# Patient Record
Sex: MALE | Race: OTHER | HISPANIC OR LATINO | URBAN - METROPOLITAN AREA
[De-identification: names, ages, dates, MRNs, and addresses within clinical notes are randomized per-mention and may not be internally consistent; named-entity substitution may affect disease eponyms.]

---

## 2022-07-18 ENCOUNTER — INPATIENT (INPATIENT)
Facility: HOSPITAL | Age: 87
LOS: 6 days | Discharge: ANOTHER HOSPITAL | DRG: 305 | End: 2022-07-25
Attending: INTERNAL MEDICINE | Admitting: INTERNAL MEDICINE
Payer: MEDICARE

## 2022-07-18 VITALS
TEMPERATURE: 99 F | OXYGEN SATURATION: 95 % | HEART RATE: 128 BPM | RESPIRATION RATE: 17 BRPM | WEIGHT: 240.08 LBS | SYSTOLIC BLOOD PRESSURE: 141 MMHG | HEIGHT: 69 IN | DIASTOLIC BLOOD PRESSURE: 76 MMHG

## 2022-07-18 DIAGNOSIS — I48.91 UNSPECIFIED ATRIAL FIBRILLATION: ICD-10-CM

## 2022-07-18 DIAGNOSIS — E11.9 TYPE 2 DIABETES MELLITUS WITHOUT COMPLICATIONS: ICD-10-CM

## 2022-07-18 DIAGNOSIS — Z29.9 ENCOUNTER FOR PROPHYLACTIC MEASURES, UNSPECIFIED: ICD-10-CM

## 2022-07-18 DIAGNOSIS — R07.9 CHEST PAIN, UNSPECIFIED: ICD-10-CM

## 2022-07-18 DIAGNOSIS — G47.33 OBSTRUCTIVE SLEEP APNEA (ADULT) (PEDIATRIC): ICD-10-CM

## 2022-07-18 DIAGNOSIS — Z86.79 PERSONAL HISTORY OF OTHER DISEASES OF THE CIRCULATORY SYSTEM: ICD-10-CM

## 2022-07-18 LAB
ALBUMIN SERPL ELPH-MCNC: 3.2 G/DL — LOW (ref 3.5–5)
ALP SERPL-CCNC: 64 U/L — SIGNIFICANT CHANGE UP (ref 40–120)
ALT FLD-CCNC: 33 U/L DA — SIGNIFICANT CHANGE UP (ref 10–60)
ANION GAP SERPL CALC-SCNC: 10 MMOL/L — SIGNIFICANT CHANGE UP (ref 5–17)
AST SERPL-CCNC: 18 U/L — SIGNIFICANT CHANGE UP (ref 10–40)
BASOPHILS # BLD AUTO: 0.05 K/UL — SIGNIFICANT CHANGE UP (ref 0–0.2)
BASOPHILS NFR BLD AUTO: 0.4 % — SIGNIFICANT CHANGE UP (ref 0–2)
BILIRUB SERPL-MCNC: 0.4 MG/DL — SIGNIFICANT CHANGE UP (ref 0.2–1.2)
BUN SERPL-MCNC: 18 MG/DL — SIGNIFICANT CHANGE UP (ref 7–18)
CALCIUM SERPL-MCNC: 9.2 MG/DL — SIGNIFICANT CHANGE UP (ref 8.4–10.5)
CHLORIDE SERPL-SCNC: 105 MMOL/L — SIGNIFICANT CHANGE UP (ref 96–108)
CO2 SERPL-SCNC: 25 MMOL/L — SIGNIFICANT CHANGE UP (ref 22–31)
CREAT SERPL-MCNC: 1.72 MG/DL — HIGH (ref 0.5–1.3)
EGFR: 39 ML/MIN/1.73M2 — LOW
EOSINOPHIL # BLD AUTO: 0.09 K/UL — SIGNIFICANT CHANGE UP (ref 0–0.5)
EOSINOPHIL NFR BLD AUTO: 0.8 % — SIGNIFICANT CHANGE UP (ref 0–6)
GLUCOSE BLDC GLUCOMTR-MCNC: 128 MG/DL — HIGH (ref 70–99)
GLUCOSE BLDC GLUCOMTR-MCNC: 134 MG/DL — HIGH (ref 70–99)
GLUCOSE SERPL-MCNC: 152 MG/DL — HIGH (ref 70–99)
HCT VFR BLD CALC: 51.1 % — HIGH (ref 39–50)
HGB BLD-MCNC: 16.9 G/DL — SIGNIFICANT CHANGE UP (ref 13–17)
IMM GRANULOCYTES NFR BLD AUTO: 2 % — HIGH (ref 0–1.5)
LYMPHOCYTES # BLD AUTO: 1.37 K/UL — SIGNIFICANT CHANGE UP (ref 1–3.3)
LYMPHOCYTES # BLD AUTO: 11.7 % — LOW (ref 13–44)
MAGNESIUM SERPL-MCNC: 2.3 MG/DL — SIGNIFICANT CHANGE UP (ref 1.6–2.6)
MCHC RBC-ENTMCNC: 32.5 PG — SIGNIFICANT CHANGE UP (ref 27–34)
MCHC RBC-ENTMCNC: 33.1 GM/DL — SIGNIFICANT CHANGE UP (ref 32–36)
MCV RBC AUTO: 98.3 FL — SIGNIFICANT CHANGE UP (ref 80–100)
MONOCYTES # BLD AUTO: 0.89 K/UL — SIGNIFICANT CHANGE UP (ref 0–0.9)
MONOCYTES NFR BLD AUTO: 7.6 % — SIGNIFICANT CHANGE UP (ref 2–14)
NEUTROPHILS # BLD AUTO: 9.08 K/UL — HIGH (ref 1.8–7.4)
NEUTROPHILS NFR BLD AUTO: 77.5 % — HIGH (ref 43–77)
NRBC # BLD: 0 /100 WBCS — SIGNIFICANT CHANGE UP (ref 0–0)
NT-PROBNP SERPL-SCNC: 1225 PG/ML — HIGH (ref 0–450)
PHOSPHATE SERPL-MCNC: 2.2 MG/DL — LOW (ref 2.5–4.5)
PLATELET # BLD AUTO: 206 K/UL — SIGNIFICANT CHANGE UP (ref 150–400)
POTASSIUM SERPL-MCNC: 4.7 MMOL/L — SIGNIFICANT CHANGE UP (ref 3.5–5.3)
POTASSIUM SERPL-SCNC: 4.7 MMOL/L — SIGNIFICANT CHANGE UP (ref 3.5–5.3)
PROT SERPL-MCNC: 7.8 G/DL — SIGNIFICANT CHANGE UP (ref 6–8.3)
RBC # BLD: 5.2 M/UL — SIGNIFICANT CHANGE UP (ref 4.2–5.8)
RBC # FLD: 13.2 % — SIGNIFICANT CHANGE UP (ref 10.3–14.5)
SARS-COV-2 RNA SPEC QL NAA+PROBE: SIGNIFICANT CHANGE UP
SODIUM SERPL-SCNC: 140 MMOL/L — SIGNIFICANT CHANGE UP (ref 135–145)
TROPONIN I, HIGH SENSITIVITY RESULT: 15.3 NG/L — SIGNIFICANT CHANGE UP
TROPONIN I, HIGH SENSITIVITY RESULT: 284.7 NG/L — HIGH
TROPONIN I, HIGH SENSITIVITY RESULT: 331.6 NG/L — HIGH
WBC # BLD: 11.71 K/UL — HIGH (ref 3.8–10.5)
WBC # FLD AUTO: 11.71 K/UL — HIGH (ref 3.8–10.5)

## 2022-07-18 PROCEDURE — 99285 EMERGENCY DEPT VISIT HI MDM: CPT

## 2022-07-18 PROCEDURE — 71045 X-RAY EXAM CHEST 1 VIEW: CPT | Mod: 26

## 2022-07-18 PROCEDURE — 93010 ELECTROCARDIOGRAM REPORT: CPT | Mod: 76

## 2022-07-18 RX ORDER — HYDRALAZINE HCL 50 MG
50 TABLET ORAL THREE TIMES A DAY
Refills: 0 | Status: DISCONTINUED | OUTPATIENT
Start: 2022-07-18 | End: 2022-07-19

## 2022-07-18 RX ORDER — INSULIN LISPRO 100/ML
VIAL (ML) SUBCUTANEOUS AT BEDTIME
Refills: 0 | Status: DISCONTINUED | OUTPATIENT
Start: 2022-07-18 | End: 2022-07-25

## 2022-07-18 RX ORDER — ATORVASTATIN CALCIUM 80 MG/1
10 TABLET, FILM COATED ORAL AT BEDTIME
Refills: 0 | Status: DISCONTINUED | OUTPATIENT
Start: 2022-07-18 | End: 2022-07-25

## 2022-07-18 RX ORDER — INSULIN LISPRO 100/ML
VIAL (ML) SUBCUTANEOUS
Refills: 0 | Status: DISCONTINUED | OUTPATIENT
Start: 2022-07-18 | End: 2022-07-25

## 2022-07-18 RX ORDER — RIVAROXABAN 15 MG-20MG
15 KIT ORAL
Refills: 0 | Status: DISCONTINUED | OUTPATIENT
Start: 2022-07-18 | End: 2022-07-24

## 2022-07-18 RX ORDER — AMLODIPINE BESYLATE 2.5 MG/1
5 TABLET ORAL DAILY
Refills: 0 | Status: DISCONTINUED | OUTPATIENT
Start: 2022-07-18 | End: 2022-07-19

## 2022-07-18 RX ORDER — MONTELUKAST 4 MG/1
10 TABLET, CHEWABLE ORAL DAILY
Refills: 0 | Status: DISCONTINUED | OUTPATIENT
Start: 2022-07-18 | End: 2022-07-25

## 2022-07-18 RX ADMIN — ATORVASTATIN CALCIUM 10 MILLIGRAM(S): 80 TABLET, FILM COATED ORAL at 21:49

## 2022-07-18 RX ADMIN — Medication 50 MILLIGRAM(S): at 21:49

## 2022-07-18 RX ADMIN — MONTELUKAST 10 MILLIGRAM(S): 4 TABLET, CHEWABLE ORAL at 21:49

## 2022-07-18 RX ADMIN — RIVAROXABAN 15 MILLIGRAM(S): KIT at 17:03

## 2022-07-18 NOTE — ED ADULT TRIAGE NOTE - CHIEF COMPLAINT QUOTE
on and off chest pain for 4 days, pain worse with exertion. 4 baby aspirin and 1 SL nitro was given by EMS.

## 2022-07-18 NOTE — H&P ADULT - NSHPREVIEWOFSYSTEMS_GEN_ALL_CORE
REVIEW OF SYSTEMS:    CONSTITUTIONAL: No weakness, fevers or chills  EYES/ENT: No visual changes;  No vertigo or throat pain   NECK: No pain or stiffness  RESPIRATORY: No cough, wheezing, hemoptysis; + shortness of breath  CARDIOVASCULAR: + chest pain + palpitations  GASTROINTESTINAL: No abdominal or epigastric pain. No nausea, vomiting, or hematemesis; No diarrhea or constipation. No melena or hematochezia.  GENITOURINARY: No dysuria, frequency or hematuria  NEUROLOGICAL: No numbness or weakness  SKIN: No itching, rashes

## 2022-07-18 NOTE — H&P ADULT - NSHPSOCIALHISTORY_GEN_ALL_CORE
Patient is an obese male, Hx of smoking 40 years ago and quit  ambulates with no assistance  lives at home with family

## 2022-07-18 NOTE — H&P ADULT - ASSESSMENT
Patient is an 85 yo PMH of COPD and KENRICK on CPAP, diabetes. A fib on Xarelto, RCC s/p right nephrectomy many years ago, CKD, Obese male who lives at home with wife and son, ambulates at baseline, comes in to the ED, due to gradual onset, worsening, intermittent shortness of breath, worse with exertion, associated with pressure like, mid, upper sternal chest discomfort, over the last 4 days, referred to ED by OP provider, s/p aspirin and nitroglycerin which provided improvement in symptoms in the ED, found to have EKG showing A -fib RVR,     Labs: WBC 11, Cr. 1.7 BNP 12K, trop 15  CXR: wet read: vascular congestion  Hence patient was admitted for A-fib RVR, and further ischemia eval Patient is an 85 yo PMH of COPD and KENRICK on CPAP, diabetes. A fib on Xarelto, RCC s/p right nephrectomy many years ago, CKD, Obese male who lives at home with wife and son, ambulates at baseline, comes in to the ED, due to gradual onset, worsening, intermittent shortness of breath, worse with exertion, associated with pressure like, mid, upper sternal chest discomfort, over the last 4 days, referred to ED by OP provider, s/p aspirin and nitroglycerin which provided improvement in symptoms in the ED, found to have EKG showing A -fib RVR,     Labs: WBC 11, Cr. 1.7 BNP 1200s, trop 15  CXR: wet read: vascular congestion  Hence patient was admitted for A-fib RVR.

## 2022-07-18 NOTE — H&P ADULT - ATTENDING COMMENTS
87 yo PMH of COPD and KENRICK on CPAP, diabetes. A fib on Xarelto, RCC s/p right nephrectomy many years ago, CKD, Obese male who lives at home with wife and son, ambulates at baseline, comes in to the ED, due to gradual onset, worsening, intermittent shortness of breath, worse with exertion, associated with pressure like, mid, upper sternal chest discomfort, over the last 4 days. Patient reports feeling tired, and shortness of breath on waking up, that is chronic however, the last 4 days have been different. As patient was going down the stairs today, felt palpitations and chest pain, that prompted him to call his Pulm, who prompted him to call 911. In the EMS route, received aspirin and nitroglycerin which provided improvement in symptoms in the ED.    Of note: patient follows with Dr. Brynn Rodriguez outpatient: Records shows last Echo in 2020 with EF of 58%.    Reported: pressure headaches with the shortness of breath, as well as chronic numbness and tingling in the feet, however, denied weakness, fever, chills, nausea or vomiting or changes in bowel or bladder habits    In the ED /76  with EKG showing A -fib RVR  Exam: Irregular heart sounds, no acute distress, 1 + pitting edema  Labs: WBC 11, Cr. 1.7 BNP 1200s, trop 15  CXR: wet read: vascular congestion          assessment   --- chest pain,  r/o acs, sob, r/o chf, h/o COPD and KENRICK on CPAP, diabetes. A fib on Xarelto, RCC s/p right nephrectomy many years ago, CKD    plan  --  adm to tele, acs protocol, lopressor, aspirin, statin, lispro ss, supplemental O2, cont preadmit home meds, gi and dvt prophylaxis  cbc, bmp, mg, phos, lipid, tsh, ce q8 x3, hgba1c,     echo    cardio cons     pulm cons

## 2022-07-18 NOTE — H&P ADULT - NSICDXPASTMEDICALHX_GEN_ALL_CORE_FT
PAST MEDICAL HISTORY:  Diabetes     H/O: HTN (hypertension)     Kidney tumor     KENRICK and COPD overlap syndrome

## 2022-07-18 NOTE — H&P ADULT - HISTORY OF PRESENT ILLNESS
Patient is an 87 yo PMH of COPD and KENRICK on CPAP, diabetes. A fib on Xarelto, RCC s/p right nephrectomy many years ago, CKD, Obese male who lives at home with wife and son, ambulates at baseline, comes in to the ED, due to gradual onset, worsening, intermittent shortness of breath, worse with exertion, associated with pressure like, mid, upper sternal chest discomfort, over the last 4 days. Patient reports feeling tired, and shortness of breath on waking up, that is chronic however, the last 4 days have been different. As patient was going down the stairs today, felt palpitations and chest pain, that prompted him to call his Pulm, who prompted him to call 911. In the EMS route, received aspirin and nitroglycerin which provided improvement in symptoms in the ED.    Of note: patient follows with Dr. Brynn Rodriguez outpatient: Records shows last Echo in 2020 with EF of 58%.    Reported: pressure headaches with the shortness of breath, as well as chronic numbness and tingling in the feet, however, denied weakness, fever, chills, nausea or vomiting or changes in bowel or bladder habits    In the ED /76  with EKG showing A -fib RVR  Exam: Irregular heart sounds, no acute distress, 1 + pitting edema  Labs: WBC 11, Cr. 1.7 BNP 12K, trop 15  CXR: wet read: vascular congestion    Hence patient was admitted for A-fib RVR, and further ischemia eval Patient is an 87 yo PMH of COPD and KENRICK on CPAP, diabetes. A fib on Xarelto, RCC s/p right nephrectomy many years ago, CKD, Obese male who lives at home with wife and son, ambulates at baseline, comes in to the ED, due to gradual onset, worsening, intermittent shortness of breath, worse with exertion, associated with pressure like, mid, upper sternal chest discomfort, over the last 4 days. Patient reports feeling tired, and shortness of breath on waking up, that is chronic however, the last 4 days have been different. As patient was going down the stairs today, felt palpitations and chest pain, that prompted him to call his Pulm, who prompted him to call 911. In the EMS route, received aspirin and nitroglycerin which provided improvement in symptoms in the ED.    Of note: patient follows with Dr. Brynn Rodriguez outpatient: Records shows last Echo in 2020 with EF of 58%.    Reported: pressure headaches with the shortness of breath, as well as chronic numbness and tingling in the feet, however, denied weakness, fever, chills, nausea or vomiting or changes in bowel or bladder habits    In the ED /76  with EKG showing A -fib RVR  Exam: Irregular heart sounds, no acute distress, 1 + pitting edema  Labs: WBC 11, Cr. 1.7 BNP 1200s, trop 15  CXR: wet read: vascular congestion    Hence patient was admitted for A-fib RVR, and further ischemia eval

## 2022-07-18 NOTE — ED PROVIDER NOTE - PHYSICAL EXAMINATION
Gen: NAD, AOx3, able to make needs known, non-toxic  Head: NCAT  HEENT: EOMI, oral mucosa moist, normal conjunctiva  Lung: CTAB, no respiratory distress, no wheezes/rhonchi/rales B/L, speaking in full sentences  CV: tachycardic, irregular  Abd: non distended, soft, nontender, no guarding, no CVA tenderness  MSK: no visible deformities  Neuro: Appears non focal  Skin: Warm, well perfused  Psych: normal affect

## 2022-07-18 NOTE — H&P ADULT - PROBLEM SELECTOR PLAN 1
Comes with  4 days of worsening shortness of breath, palpitations, and chest pain  Found with EKG showing RVR 120s  s/p nitro, and felt better, the rate is now controlled  Patient's most recent echo in 2020 with EF 58%  No rate control at home, but on xarelto  BNP in 12K  Trop 15  will continue to trend trop  -f/u CXR read  -Diuresis on stand by, Cr. 1.7, patient is in NAD, and no crackles heard, despite mild edema  -Tele, Echo +/- stress   Dr. Almeida consulted: follow recs Comes with  4 days of worsening shortness of breath, palpitations, and chest pain  Found with EKG showing RVR 120s  s/p nitro, and felt better, the rate is now controlled  Patient's most recent echo in 2020 with EF 58%  No rate control at home, but on xarelto  BNP in 1200  Trop 15  will continue to trend trop  -f/u CXR read  -Diuresis on stand by, Cr. 1.7, patient is in NAD, and no crackles heard, despite mild edema  -Tele, Echo +/- stress   Dr. Almeida consulted: follow recs

## 2022-07-18 NOTE — H&P ADULT - NSHPPHYSICALEXAM_GEN_ALL_CORE
T(C): 36.7 (07-18-22 @ 12:31), Max: 37.1 (07-18-22 @ 09:04)  T(F): 98.1 (07-18-22 @ 12:31), Max: 98.8 (07-18-22 @ 09:04)  HR: 100 (07-18-22 @ 12:31) (100 - 128)  BP: 115/63 (07-18-22 @ 12:31) (115/63 - 141/76)  RR: 18 (07-18-22 @ 12:31) (17 - 18)  SpO2: 97% (07-18-22 @ 12:31) (95% - 97%)    GENERAL: NAD, lying in bed comfortably  HEAD:  Atraumatic, Normocephalic  EYES: EOMI, PERRLA, conjunctiva and sclera clear  ENT: Moist mucous membranes  NECK: Supple, No JVD  CHEST/LUNG: Decreased breath sounds bilaterally  HEART: irregular rate and rhythm; No murmurs, rubs, or gallops  ABDOMEN: Bowel sounds present; Soft, obese, non tender  EXTREMITIES:  2+ Peripheral Pulses, brisk capillary refill. No clubbing, cyanosis, 2+ edema  NERVOUS SYSTEM:  Alert & Oriented X3, speech clear. No deficits   MSK: FROM all 4 extremities, full and equal strength  SKIN: No rashes or lesions

## 2022-07-18 NOTE — ED PROVIDER NOTE - OBJECTIVE STATEMENT
87 y/o M w/ PMH of HTN, DM, HLD, COPD, ?a fib on xarelto presenting w/ chest pain. Seen w/ wife.  used. Reports intermittent chest pain for the past 3-4 days. Described as pressure in center of his chest. Associated w/ some shortness of breath sensation as well. Worse w/ exertion. Describing a sensation of palpitations as well. Spoke w/ his pulmonologist who recommended pt come to ED for eval. EMS gave pt 324 mg of aspirin and 1 nitro. Reports symptoms improved after medication given. Denies fevers, chills, headache, dizziness, blurred vision, cough, abdominal pain, n/v/d/c, urinary symptoms, MSK pain, rash.     PCP: Memo Monae

## 2022-07-18 NOTE — PROVIDER CONTACT NOTE (CRITICAL VALUE NOTIFICATION) - NAME OF MD/NP/PA/DO NOTIFIED:
Dr. Michelle at 1907; instructed to endorse to MAR (Dr. White)    at 1916DrJesus White notified at 1922.

## 2022-07-18 NOTE — H&P ADULT - PROBLEM SELECTOR PLAN 3
Long standing DM hx, now on farxiga and januvia  Finger sticks 160s  c/w ACHS  start with sliding scale   and continue to titrate as needed

## 2022-07-18 NOTE — ED PROVIDER NOTE - CLINICAL SUMMARY MEDICAL DECISION MAKING FREE TEXT BOX
85 y/o M w/ PMH as above presenting w/ SOB and chest pain. Well appearing, no acute distress. Pt w/ a fib on EKG. Stated he takes xarelto for irregular heart beat, however no rate controller medication seen in bag of pt's medications. Symptoms possible due to a fib. Eval for ACS. Eval for CHF exacerbation. Doubtful PNA. Doubtful COPD given no wheezes and good air entry. Plan for labs, EKG, imaging, meds PRN. Likely admission. Will reassess the need for additional interventions as clinically warranted.

## 2022-07-18 NOTE — H&P ADULT - NSHPLABSRESULTS_GEN_ALL_CORE
16.9   11.71 )-----------( 206      ( 18 Jul 2022 10:04 )             51.1     07-18    140  |  105  |  18  ----------------------------<  152<H>  4.7   |  25  |  1.72<H>    Ca    9.2      18 Jul 2022 10:04  Phos  2.2     07-18  Mg     2.3     07-18    TPro  7.8  /  Alb  3.2<L>  /  TBili  0.4  /  DBili  x   /  AST  18  /  ALT  33  /  AlkPhos  64  07-18        LIVER FUNCTIONS - ( 18 Jul 2022 10:04 )  Alb: 3.2 g/dL / Pro: 7.8 g/dL / ALK PHOS: 64 U/L / ALT: 33 U/L DA / AST: 18 U/L / GGT: x                           EKG: A fib RVR rate 120s,     RADIOLOGY STUDIES:  CXR: wet read vascular congestion, pending official read

## 2022-07-18 NOTE — H&P ADULT - PROBLEM SELECTOR PLAN 2
Patient on ICS at home as well CPAP  Dr. Monique consulted  f/u recs for CPAP night settings  Albuterol PRN  symbicort

## 2022-07-19 DIAGNOSIS — N17.9 ACUTE KIDNEY FAILURE, UNSPECIFIED: ICD-10-CM

## 2022-07-19 LAB
A1C WITH ESTIMATED AVERAGE GLUCOSE RESULT: 6.4 % — HIGH (ref 4–5.6)
ALBUMIN SERPL ELPH-MCNC: 3.3 G/DL — LOW (ref 3.5–5)
ALP SERPL-CCNC: 64 U/L — SIGNIFICANT CHANGE UP (ref 40–120)
ALT FLD-CCNC: 35 U/L DA — SIGNIFICANT CHANGE UP (ref 10–60)
ANION GAP SERPL CALC-SCNC: 10 MMOL/L — SIGNIFICANT CHANGE UP (ref 5–17)
APPEARANCE UR: CLEAR — SIGNIFICANT CHANGE UP
AST SERPL-CCNC: 25 U/L — SIGNIFICANT CHANGE UP (ref 10–40)
BACTERIA # UR AUTO: ABNORMAL /HPF
BASOPHILS # BLD AUTO: 0.07 K/UL — SIGNIFICANT CHANGE UP (ref 0–0.2)
BASOPHILS NFR BLD AUTO: 0.7 % — SIGNIFICANT CHANGE UP (ref 0–2)
BILIRUB SERPL-MCNC: 0.9 MG/DL — SIGNIFICANT CHANGE UP (ref 0.2–1.2)
BILIRUB UR-MCNC: NEGATIVE — SIGNIFICANT CHANGE UP
BUN SERPL-MCNC: 20 MG/DL — HIGH (ref 7–18)
CALCIUM SERPL-MCNC: 9.4 MG/DL — SIGNIFICANT CHANGE UP (ref 8.4–10.5)
CHLORIDE SERPL-SCNC: 104 MMOL/L — SIGNIFICANT CHANGE UP (ref 96–108)
CHOLEST SERPL-MCNC: 156 MG/DL — SIGNIFICANT CHANGE UP
CO2 SERPL-SCNC: 26 MMOL/L — SIGNIFICANT CHANGE UP (ref 22–31)
COLOR SPEC: YELLOW — SIGNIFICANT CHANGE UP
COMMENT - URINE: SIGNIFICANT CHANGE UP
CREAT ?TM UR-MCNC: 174 MG/DL — SIGNIFICANT CHANGE UP
CREAT SERPL-MCNC: 1.66 MG/DL — HIGH (ref 0.5–1.3)
DIFF PNL FLD: ABNORMAL
EGFR: 40 ML/MIN/1.73M2 — LOW
EOSINOPHIL # BLD AUTO: 0.18 K/UL — SIGNIFICANT CHANGE UP (ref 0–0.5)
EOSINOPHIL NFR BLD AUTO: 1.7 % — SIGNIFICANT CHANGE UP (ref 0–6)
EPI CELLS # UR: SIGNIFICANT CHANGE UP /HPF
ESTIMATED AVERAGE GLUCOSE: 137 MG/DL — HIGH (ref 68–114)
GLUCOSE BLDC GLUCOMTR-MCNC: 108 MG/DL — HIGH (ref 70–99)
GLUCOSE BLDC GLUCOMTR-MCNC: 124 MG/DL — HIGH (ref 70–99)
GLUCOSE BLDC GLUCOMTR-MCNC: 143 MG/DL — HIGH (ref 70–99)
GLUCOSE BLDC GLUCOMTR-MCNC: 170 MG/DL — HIGH (ref 70–99)
GLUCOSE SERPL-MCNC: 140 MG/DL — HIGH (ref 70–99)
GLUCOSE UR QL: 1000 MG/DL
HCT VFR BLD CALC: 49.4 % — SIGNIFICANT CHANGE UP (ref 39–50)
HDLC SERPL-MCNC: 43 MG/DL — SIGNIFICANT CHANGE UP
HGB BLD-MCNC: 16.7 G/DL — SIGNIFICANT CHANGE UP (ref 13–17)
IMM GRANULOCYTES NFR BLD AUTO: 1.6 % — HIGH (ref 0–1.5)
KETONES UR-MCNC: NEGATIVE — SIGNIFICANT CHANGE UP
LEUKOCYTE ESTERASE UR-ACNC: ABNORMAL
LIPID PNL WITH DIRECT LDL SERPL: 82 MG/DL — SIGNIFICANT CHANGE UP
LYMPHOCYTES # BLD AUTO: 19.2 % — SIGNIFICANT CHANGE UP (ref 13–44)
LYMPHOCYTES # BLD AUTO: 2.01 K/UL — SIGNIFICANT CHANGE UP (ref 1–3.3)
MAGNESIUM SERPL-MCNC: 2.6 MG/DL — SIGNIFICANT CHANGE UP (ref 1.6–2.6)
MCHC RBC-ENTMCNC: 32.9 PG — SIGNIFICANT CHANGE UP (ref 27–34)
MCHC RBC-ENTMCNC: 33.8 GM/DL — SIGNIFICANT CHANGE UP (ref 32–36)
MCV RBC AUTO: 97.2 FL — SIGNIFICANT CHANGE UP (ref 80–100)
MONOCYTES # BLD AUTO: 0.9 K/UL — SIGNIFICANT CHANGE UP (ref 0–0.9)
MONOCYTES NFR BLD AUTO: 8.6 % — SIGNIFICANT CHANGE UP (ref 2–14)
NEUTROPHILS # BLD AUTO: 7.12 K/UL — SIGNIFICANT CHANGE UP (ref 1.8–7.4)
NEUTROPHILS NFR BLD AUTO: 68.2 % — SIGNIFICANT CHANGE UP (ref 43–77)
NITRITE UR-MCNC: NEGATIVE — SIGNIFICANT CHANGE UP
NON HDL CHOLESTEROL: 113 MG/DL — SIGNIFICANT CHANGE UP
NRBC # BLD: 0 /100 WBCS — SIGNIFICANT CHANGE UP (ref 0–0)
OSMOLALITY UR: 630 MOS/KG — SIGNIFICANT CHANGE UP (ref 50–1200)
PH UR: 5 — SIGNIFICANT CHANGE UP (ref 5–8)
PHOSPHATE SERPL-MCNC: 2.7 MG/DL — SIGNIFICANT CHANGE UP (ref 2.5–4.5)
PLATELET # BLD AUTO: 206 K/UL — SIGNIFICANT CHANGE UP (ref 150–400)
POTASSIUM SERPL-MCNC: 4.9 MMOL/L — SIGNIFICANT CHANGE UP (ref 3.5–5.3)
POTASSIUM SERPL-SCNC: 4.9 MMOL/L — SIGNIFICANT CHANGE UP (ref 3.5–5.3)
PROT SERPL-MCNC: 7.7 G/DL — SIGNIFICANT CHANGE UP (ref 6–8.3)
PROT UR-MCNC: 100
RBC # BLD: 5.08 M/UL — SIGNIFICANT CHANGE UP (ref 4.2–5.8)
RBC # FLD: 13.1 % — SIGNIFICANT CHANGE UP (ref 10.3–14.5)
RBC CASTS # UR COMP ASSIST: SIGNIFICANT CHANGE UP /HPF (ref 0–2)
SODIUM SERPL-SCNC: 140 MMOL/L — SIGNIFICANT CHANGE UP (ref 135–145)
SODIUM UR-SCNC: 19 MMOL/L — SIGNIFICANT CHANGE UP
SP GR SPEC: 1.02 — SIGNIFICANT CHANGE UP (ref 1.01–1.02)
TRIGL SERPL-MCNC: 157 MG/DL — HIGH
TSH SERPL-MCNC: 1.99 UU/ML — SIGNIFICANT CHANGE UP (ref 0.34–4.82)
UROBILINOGEN FLD QL: NEGATIVE — SIGNIFICANT CHANGE UP
WBC # BLD: 10.45 K/UL — SIGNIFICANT CHANGE UP (ref 3.8–10.5)
WBC # FLD AUTO: 10.45 K/UL — SIGNIFICANT CHANGE UP (ref 3.8–10.5)
WBC UR QL: ABNORMAL /HPF (ref 0–5)

## 2022-07-19 RX ORDER — AMIODARONE HYDROCHLORIDE 400 MG/1
TABLET ORAL
Refills: 0 | Status: DISCONTINUED | OUTPATIENT
Start: 2022-07-19 | End: 2022-07-20

## 2022-07-19 RX ORDER — PANTOPRAZOLE SODIUM 20 MG/1
40 TABLET, DELAYED RELEASE ORAL
Refills: 0 | Status: DISCONTINUED | OUTPATIENT
Start: 2022-07-19 | End: 2022-07-25

## 2022-07-19 RX ORDER — DIGOXIN 250 MCG
500 TABLET ORAL ONCE
Refills: 0 | Status: COMPLETED | OUTPATIENT
Start: 2022-07-19 | End: 2022-07-19

## 2022-07-19 RX ORDER — AMIODARONE HYDROCHLORIDE 400 MG/1
400 TABLET ORAL EVERY 8 HOURS
Refills: 0 | Status: DISCONTINUED | OUTPATIENT
Start: 2022-07-19 | End: 2022-07-20

## 2022-07-19 RX ORDER — METOPROLOL TARTRATE 50 MG
25 TABLET ORAL EVERY 8 HOURS
Refills: 0 | Status: DISCONTINUED | OUTPATIENT
Start: 2022-07-19 | End: 2022-07-20

## 2022-07-19 RX ADMIN — ATORVASTATIN CALCIUM 10 MILLIGRAM(S): 80 TABLET, FILM COATED ORAL at 21:35

## 2022-07-19 RX ADMIN — Medication 25 MILLIGRAM(S): at 08:57

## 2022-07-19 RX ADMIN — Medication 25 MILLIGRAM(S): at 21:35

## 2022-07-19 RX ADMIN — Medication 50 MILLIGRAM(S): at 05:57

## 2022-07-19 RX ADMIN — Medication 25 MILLIGRAM(S): at 14:21

## 2022-07-19 RX ADMIN — AMLODIPINE BESYLATE 5 MILLIGRAM(S): 2.5 TABLET ORAL at 05:57

## 2022-07-19 RX ADMIN — AMIODARONE HYDROCHLORIDE 400 MILLIGRAM(S): 400 TABLET ORAL at 21:35

## 2022-07-19 RX ADMIN — PANTOPRAZOLE SODIUM 40 MILLIGRAM(S): 20 TABLET, DELAYED RELEASE ORAL at 12:31

## 2022-07-19 RX ADMIN — RIVAROXABAN 15 MILLIGRAM(S): KIT at 17:53

## 2022-07-19 RX ADMIN — AMIODARONE HYDROCHLORIDE 400 MILLIGRAM(S): 400 TABLET ORAL at 14:21

## 2022-07-19 RX ADMIN — MONTELUKAST 10 MILLIGRAM(S): 4 TABLET, CHEWABLE ORAL at 12:30

## 2022-07-19 RX ADMIN — Medication 500 MICROGRAM(S): at 08:57

## 2022-07-19 NOTE — CONSULT NOTE ADULT - PROBLEM SELECTOR RECOMMENDATION 2
Cardio follow up  Amiodarone Cardio follow up  Stress test  Amiodarone Cardio eval  tele monitoring  Amiodarone loading  Stress test

## 2022-07-19 NOTE — PROGRESS NOTE ADULT - SUBJECTIVE AND OBJECTIVE BOX
PGY-1 Progress Note discussed with attending    PAGER #: [[phone]] TILL 5:00 PM  PLEASE CONTACT ON CALL TEAM:  - On Call Team (Please refer to Sri) FROM 5:00 PM - 8:30PM  - Nightfloat Team FROM 8:30 -7:30 AM    CHIEF COMPLAINT & BRIEF HOSPITAL COURSE:  Patient is an 85 yo PMH of COPD and KENRICK on CPAP, diabetes. A fib on Xarelto, RCC s/p right nephrectomy many years ago, CKD, Obese male who lives at home with wife and son, ambulates at baseline, comes in to the ED, due to gradual onset, worsening, intermittent shortness of breath, worse with exertion, associated with pressure like, mid, upper sternal chest discomfort, over the last 4 days. Patient reports feeling tired, and shortness of breath on waking up, that is chronic however, the last 4 days have been different. As patient was going down the stairs today, felt palpitations and chest pain, that prompted him to call his Pulm, who prompted him to call 911. In the EMS route, received aspirin and nitroglycerin which provided improvement in symptoms in the ED.    Of note: patient follows with Dr. Brynn Rodriguez outpatient: Records shows last Echo in 2020 with EF of 58%.    Reported: pressure headaches with the shortness of breath, as well as chronic numbness and tingling in the feet, however, denied weakness, fever, chills, nausea or vomiting or changes in bowel or bladder habits    INTERVAL HPI/OVERNIGHT EVENTS:   Patient seen and examined at bedside. Patient in AFIB RVR at bedside, complaining of palpitations. No other complaints today.    MEDICATIONS  (STANDING):  aMIOdarone    Tablet   Oral   aMIOdarone    Tablet 400 milliGRAM(s) Oral every 8 hours  atorvastatin 10 milliGRAM(s) Oral at bedtime  insulin lispro (ADMELOG) corrective regimen sliding scale   SubCutaneous three times a day before meals  insulin lispro (ADMELOG) corrective regimen sliding scale   SubCutaneous at bedtime  metoprolol tartrate 25 milliGRAM(s) Oral every 8 hours  montelukast 10 milliGRAM(s) Oral daily  pantoprazole    Tablet 40 milliGRAM(s) Oral before breakfast  rivaroxaban 15 milliGRAM(s) Oral with dinner    MEDICATIONS  (PRN):      REVIEW OF SYSTEMS:  CONSTITUTIONAL: No fever, weight loss, or fatigue  RESPIRATORY: No cough, wheezing, chills or hemoptysis; No shortness of breath  CARDIOVASCULAR: No chest pain, dizziness, or leg swelling. + Palpitations  GASTROINTESTINAL: No abdominal pain. No nausea, vomiting, or hematemesis; No diarrhea or constipation. No melena or hematochezia.  GENITOURINARY: No dysuria or hematuria, urinary frequency  NEUROLOGICAL: No headaches, memory loss, loss of strength, numbness, or tremors  SKIN: No itching, burning, rashes, or lesions     Vital Signs Last 24 Hrs  T(C): 36.6 (19 Jul 2022 15:45), Max: 36.8 (19 Jul 2022 07:32)  T(F): 97.8 (19 Jul 2022 15:45), Max: 98.3 (19 Jul 2022 07:32)  HR: 83 (19 Jul 2022 15:45) (80 - 105)  BP: 115/70 (19 Jul 2022 15:45) (115/70 - 174/65)  BP(mean): --  RR: 19 (19 Jul 2022 15:45) (18 - 19)  SpO2: 96% (19 Jul 2022 15:45) (93% - 100%)    Parameters below as of 19 Jul 2022 15:45  Patient On (Oxygen Delivery Method): room air        PHYSICAL EXAMINATION:  GENERAL: NAD, Obese  HEAD:  Atraumatic, Normocephalic  EYES:  conjunctiva and sclera clear  NECK: Supple, No JVD, Normal thyroid  CHEST/LUNG: Clear to auscultation. Clear to percussion bilaterally; No rales, rhonchi, wheezing, or rubs  HEART: irregular heart rate and rhythym; No murmurs, rubs, or gallops  ABDOMEN: Soft, Nontender, Nondistended; Bowel sounds present  NERVOUS SYSTEM:  Alert & Oriented X3,    EXTREMITIES:  2+ Peripheral Pulses, No clubbing, cyanosis, or edema  SKIN: warm dry                          16.7   10.45 )-----------( 206      ( 19 Jul 2022 06:58 )             49.4     07-19    140  |  104  |  20<H>  ----------------------------<  140<H>  4.9   |  26  |  1.66<H>    Ca    9.4      19 Jul 2022 06:58  Phos  2.7     07-19  Mg     2.6     07-19    TPro  7.7  /  Alb  3.3<L>  /  TBili  0.9  /  DBili  x   /  AST  25  /  ALT  35  /  AlkPhos  64  07-19    LIVER FUNCTIONS - ( 19 Jul 2022 06:58 )  Alb: 3.3 g/dL / Pro: 7.7 g/dL / ALK PHOS: 64 U/L / ALT: 35 U/L DA / AST: 25 U/L / GGT: x                   CAPILLARY BLOOD GLUCOSE      RADIOLOGY & ADDITIONAL TESTS:    Chest Xray 7/18:  IMPRESSION: Cardiomegaly.  No large airspace consolidation or effusion.

## 2022-07-19 NOTE — PATIENT PROFILE ADULT - FALL HARM RISK - HARM RISK INTERVENTIONS

## 2022-07-19 NOTE — PROGRESS NOTE ADULT - PROBLEM SELECTOR PLAN 1
Comes with  4 days of worsening shortness of breath, palpitations, and chest pain  Found with EKG showing RVR 120s  s/p nitro, and felt better, the rate is now controlled  Patient's most recent echo in 2020 with EF 58%  No rate control at home, but on xarelto  BNP in 1200  Trop 331>284 - likely 2/2 demand ischemia  - CXR read - cardiomegaly, no pulmonary effusions  -Diuresis on stand by, Cr. 1.7, patient is in NAD, and no crackles heard, despite mild edema  -cw Tele  - fu Echo   - fu +/- stress    - cw amiodarone loading, dig .5mg ivx1, xarelto, add lopressor 25mg q8h  Cardio Dr. Almeida following - recommendations noted and appreciated

## 2022-07-19 NOTE — PROGRESS NOTE ADULT - SUBJECTIVE AND OBJECTIVE BOX
Patient is a 87y old  Male who presents with a chief complaint of Chest pain (18 Jul 2022 13:27)    pt seen in icu [  ], reg med floor [   ], bed [  ], chair at bedside [   ], a+o x3 [  ], lethargic [  ],  nad [  ]    carlin [  ], ngt [  ], peg [  ], et tube [  ], cent line [  ], picc line [  ]        Allergies    No Known Allergies        Vitals    T(F): 97.8 (07-19-22 @ 04:55), Max: 98.8 (07-18-22 @ 09:04)  HR: 80 (07-19-22 @ 04:55) (80 - 128)  BP: 127/66 (07-19-22 @ 04:55) (115/63 - 174/65)  RR: 19 (07-19-22 @ 04:55) (17 - 22)  SpO2: 93% (07-19-22 @ 04:55) (93% - 100%)  Wt(kg): --  CAPILLARY BLOOD GLUCOSE      POCT Blood Glucose.: 143 mg/dL (19 Jul 2022 07:41)      Labs                          16.7   10.45 )-----------( 206      ( 19 Jul 2022 06:58 )             49.4       07-19    140  |  104  |  20<H>  ----------------------------<  140<H>  4.9   |  x   |  x     Ca    9.2      18 Jul 2022 10:04  Phos  2.7     07-19  Mg     2.6     07-19    TPro  x   /  Alb  3.3<L>  /  TBili  x   /  DBili  x   /  AST  x   /  ALT  x   /  AlkPhos  x   07-19          Troponin I, High Sensitivity Result: 284.7 ng/L (07-18-22 @ 21:16)  Troponin I, High Sensitivity Result: 331.6 ng/L (07-18-22 @ 18:26)  Troponin I, High Sensitivity Result: 15.3 ng/L (07-18-22 @ 10:04)        Radiology Results      Meds    MEDICATIONS  (STANDING):  amLODIPine   Tablet 5 milliGRAM(s) Oral daily  atorvastatin 10 milliGRAM(s) Oral at bedtime  hydrALAZINE 50 milliGRAM(s) Oral three times a day  insulin lispro (ADMELOG) corrective regimen sliding scale   SubCutaneous three times a day before meals  insulin lispro (ADMELOG) corrective regimen sliding scale   SubCutaneous at bedtime  montelukast 10 milliGRAM(s) Oral daily  rivaroxaban 15 milliGRAM(s) Oral with dinner      MEDICATIONS  (PRN):      Physical Exam    Neuro :  no focal deficits  Respiratory: CTA B/L  CV: RRR, S1S2, no murmurs,   Abdominal: Soft, NT, ND +BS,  Extremities: No edema, + peripheral pulses    ASSESSMENT    Chest pain    Diabetes    Kidney tumor    KENRICK and COPD overlap syndrome    H/O: HTN (hypertension)        PLAN     Patient is a 87y old  Male who presents with a chief complaint of Chest pain (18 Jul 2022 13:27)    pt seen in icu [  ], reg med floor [   ], bed [  ], chair at bedside [   ], a+o x3 [  ], lethargic [  ],  nad [  ]    carlin [  ], ngt [  ], peg [  ], et tube [  ], cent line [  ], picc line [  ]        Allergies    No Known Allergies        Vitals    T(F): 97.8 (07-19-22 @ 04:55), Max: 98.8 (07-18-22 @ 09:04)  HR: 80 (07-19-22 @ 04:55) (80 - 128)  BP: 127/66 (07-19-22 @ 04:55) (115/63 - 174/65)  RR: 19 (07-19-22 @ 04:55) (17 - 22)  SpO2: 93% (07-19-22 @ 04:55) (93% - 100%)  Wt(kg): --  CAPILLARY BLOOD GLUCOSE      POCT Blood Glucose.: 143 mg/dL (19 Jul 2022 07:41)      Labs                          16.7   10.45 )-----------( 206      ( 19 Jul 2022 06:58 )             49.4       07-19    140  |  104  |  20<H>  ----------------------------<  140<H>  4.9   |  x   |  x     Ca    9.2      18 Jul 2022 10:04  Phos  2.7     07-19  Mg     2.6     07-19    TPro  x   /  Alb  3.3<L>  /  TBili  x   /  DBili  x   /  AST  x   /  ALT  x   /  AlkPhos  x   07-19          Troponin I, High Sensitivity Result: 284.7 ng/L (07-18-22 @ 21:16)  Troponin I, High Sensitivity Result: 331.6 ng/L (07-18-22 @ 18:26)  Troponin I, High Sensitivity Result: 15.3 ng/L (07-18-22 @ 10:04)        Radiology Results      Meds    MEDICATIONS  (STANDING):  amLODIPine   Tablet 5 milliGRAM(s) Oral daily  atorvastatin 10 milliGRAM(s) Oral at bedtime  hydrALAZINE 50 milliGRAM(s) Oral three times a day  insulin lispro (ADMELOG) corrective regimen sliding scale   SubCutaneous three times a day before meals  insulin lispro (ADMELOG) corrective regimen sliding scale   SubCutaneous at bedtime  montelukast 10 milliGRAM(s) Oral daily  rivaroxaban 15 milliGRAM(s) Oral with dinner      MEDICATIONS  (PRN):      Physical Exam    Neuro :  no focal deficits  Respiratory: CTA B/L  CV: RRR, S1S2, no murmurs,   Abdominal: Soft, NT, ND +BS,  Extremities: No edema, + peripheral pulses    ASSESSMENT    chest pain,    r/o acs,   sob,   r/o chf,  h/o COPD and KENRICK on CPAP,   diabetes.   A fib on Xarelto,   RCC s/p right nephrectomy many years ago,   CKD    Chest pain    Diabetes    Kidney tumor    KENRICK and COPD overlap syndrome    H/O: HTN (hypertension)        PLAN    adm to tele,   acs protocol,   lopressor,   aspirin,   statin,   lispro ss,   supplemental O2,   cont preadmit home meds,   gi and dvt prophylaxis  cbc,   bmp,  mg,   phos,   lipid,   tsh,   ce q8 x3,   hgba1c,     echo    cardio cons     pulm cons .         Patient is a 87y old  Male who presents with a chief complaint of Chest pain (18 Jul 2022 13:27)    pt seen in tele [ x ], reg med floor [   ], bed [ x ], chair at bedside [   ], a+o x3 [ x ], lethargic [  ],  nad [ x ]      Allergies    No Known Allergies        Vitals    T(F): 97.8 (07-19-22 @ 04:55), Max: 98.8 (07-18-22 @ 09:04)  HR: 80 (07-19-22 @ 04:55) (80 - 128)  BP: 127/66 (07-19-22 @ 04:55) (115/63 - 174/65)  RR: 19 (07-19-22 @ 04:55) (17 - 22)  SpO2: 93% (07-19-22 @ 04:55) (93% - 100%)  Wt(kg): --  CAPILLARY BLOOD GLUCOSE      POCT Blood Glucose.: 143 mg/dL (19 Jul 2022 07:41)      Labs                          16.7   10.45 )-----------( 206      ( 19 Jul 2022 06:58 )             49.4       07-19    140  |  104  |  20<H>  ----------------------------<  140<H>  4.9   |  x   |  x     Ca    9.2      18 Jul 2022 10:04  Phos  2.7     07-19  Mg     2.6     07-19    TPro  x   /  Alb  3.3<L>  /  TBili  x   /  DBili  x   /  AST  x   /  ALT  x   /  AlkPhos  x   07-19          Troponin I, High Sensitivity Result: 284.7 ng/L (07-18-22 @ 21:16)  Troponin I, High Sensitivity Result: 331.6 ng/L (07-18-22 @ 18:26)  Troponin I, High Sensitivity Result: 15.3 ng/L (07-18-22 @ 10:04)        Radiology Results      Meds    MEDICATIONS  (STANDING):  amLODIPine   Tablet 5 milliGRAM(s) Oral daily  atorvastatin 10 milliGRAM(s) Oral at bedtime  hydrALAZINE 50 milliGRAM(s) Oral three times a day  insulin lispro (ADMELOG) corrective regimen sliding scale   SubCutaneous three times a day before meals  insulin lispro (ADMELOG) corrective regimen sliding scale   SubCutaneous at bedtime  montelukast 10 milliGRAM(s) Oral daily  rivaroxaban 15 milliGRAM(s) Oral with dinner      MEDICATIONS  (PRN):      Physical Exam    Neuro :  no focal deficits  Respiratory: CTA B/L  CV: RRR, S1S2, no murmurs,   Abdominal: Soft, NT, ND +BS,  Extremities: No edema, + peripheral pulses    ASSESSMENT    chest pain,    r/o acs,   sob,   r/o chf,  h/o COPD   KENRICK on CPAP,   diabetes.   A fib on Xarelto,   RCC s/p right nephrectomy many years ago,   CKD      PLAN    cont tele,   acs protocol,   lopressor,   aspirin, statin,   trop x 3 noted above  cardio cons  f/u echo   supplemental O2,   pulm cons  hgba1c  lispro ss,   cont current meds

## 2022-07-19 NOTE — CONSULT NOTE ADULT - PROBLEM SELECTOR RECOMMENDATION 9
Sleep study  PFTs as OP  oxygen supp  CPAP Sleep study  PFTs as OP  oxygen supp  CPAP at night  Bronchodilators  Symbicort /4.5 mcgs 2 puffs po BID

## 2022-07-20 DIAGNOSIS — N39.0 URINARY TRACT INFECTION, SITE NOT SPECIFIED: ICD-10-CM

## 2022-07-20 LAB
ALBUMIN SERPL ELPH-MCNC: 3 G/DL — LOW (ref 3.5–5)
ALP SERPL-CCNC: 58 U/L — SIGNIFICANT CHANGE UP (ref 40–120)
ALT FLD-CCNC: 33 U/L DA — SIGNIFICANT CHANGE UP (ref 10–60)
ANION GAP SERPL CALC-SCNC: 6 MMOL/L — SIGNIFICANT CHANGE UP (ref 5–17)
AST SERPL-CCNC: 27 U/L — SIGNIFICANT CHANGE UP (ref 10–40)
BILIRUB SERPL-MCNC: 0.7 MG/DL — SIGNIFICANT CHANGE UP (ref 0.2–1.2)
BUN SERPL-MCNC: 30 MG/DL — HIGH (ref 7–18)
CALCIUM SERPL-MCNC: 9.6 MG/DL — SIGNIFICANT CHANGE UP (ref 8.4–10.5)
CHLORIDE SERPL-SCNC: 105 MMOL/L — SIGNIFICANT CHANGE UP (ref 96–108)
CO2 SERPL-SCNC: 29 MMOL/L — SIGNIFICANT CHANGE UP (ref 22–31)
CREAT SERPL-MCNC: 1.92 MG/DL — HIGH (ref 0.5–1.3)
EGFR: 34 ML/MIN/1.73M2 — LOW
GLUCOSE BLDC GLUCOMTR-MCNC: 133 MG/DL — HIGH (ref 70–99)
GLUCOSE BLDC GLUCOMTR-MCNC: 138 MG/DL — HIGH (ref 70–99)
GLUCOSE BLDC GLUCOMTR-MCNC: 143 MG/DL — HIGH (ref 70–99)
GLUCOSE BLDC GLUCOMTR-MCNC: 145 MG/DL — HIGH (ref 70–99)
GLUCOSE SERPL-MCNC: 139 MG/DL — HIGH (ref 70–99)
HCT VFR BLD CALC: 47.4 % — SIGNIFICANT CHANGE UP (ref 39–50)
HGB BLD-MCNC: 15.8 G/DL — SIGNIFICANT CHANGE UP (ref 13–17)
MAGNESIUM SERPL-MCNC: 2.5 MG/DL — SIGNIFICANT CHANGE UP (ref 1.6–2.6)
MCHC RBC-ENTMCNC: 32.4 PG — SIGNIFICANT CHANGE UP (ref 27–34)
MCHC RBC-ENTMCNC: 33.3 GM/DL — SIGNIFICANT CHANGE UP (ref 32–36)
MCV RBC AUTO: 97.3 FL — SIGNIFICANT CHANGE UP (ref 80–100)
NRBC # BLD: 0 /100 WBCS — SIGNIFICANT CHANGE UP (ref 0–0)
PHOSPHATE SERPL-MCNC: 3.6 MG/DL — SIGNIFICANT CHANGE UP (ref 2.5–4.5)
PLATELET # BLD AUTO: 184 K/UL — SIGNIFICANT CHANGE UP (ref 150–400)
POTASSIUM SERPL-MCNC: 5.2 MMOL/L — SIGNIFICANT CHANGE UP (ref 3.5–5.3)
POTASSIUM SERPL-SCNC: 5.2 MMOL/L — SIGNIFICANT CHANGE UP (ref 3.5–5.3)
PROT SERPL-MCNC: 7 G/DL — SIGNIFICANT CHANGE UP (ref 6–8.3)
RBC # BLD: 4.87 M/UL — SIGNIFICANT CHANGE UP (ref 4.2–5.8)
RBC # FLD: 13.3 % — SIGNIFICANT CHANGE UP (ref 10.3–14.5)
SODIUM SERPL-SCNC: 140 MMOL/L — SIGNIFICANT CHANGE UP (ref 135–145)
WBC # BLD: 9.59 K/UL — SIGNIFICANT CHANGE UP (ref 3.8–10.5)
WBC # FLD AUTO: 9.59 K/UL — SIGNIFICANT CHANGE UP (ref 3.8–10.5)

## 2022-07-20 RX ORDER — CEFTRIAXONE 500 MG/1
1000 INJECTION, POWDER, FOR SOLUTION INTRAMUSCULAR; INTRAVENOUS EVERY 24 HOURS
Refills: 0 | Status: COMPLETED | OUTPATIENT
Start: 2022-07-21 | End: 2022-07-24

## 2022-07-20 RX ORDER — CEFTRIAXONE 500 MG/1
1000 INJECTION, POWDER, FOR SOLUTION INTRAMUSCULAR; INTRAVENOUS ONCE
Refills: 0 | Status: COMPLETED | OUTPATIENT
Start: 2022-07-20 | End: 2022-07-20

## 2022-07-20 RX ORDER — CEFTRIAXONE 500 MG/1
INJECTION, POWDER, FOR SOLUTION INTRAMUSCULAR; INTRAVENOUS
Refills: 0 | Status: COMPLETED | OUTPATIENT
Start: 2022-07-20 | End: 2022-07-25

## 2022-07-20 RX ORDER — AMIODARONE HYDROCHLORIDE 400 MG/1
400 TABLET ORAL
Refills: 0 | Status: COMPLETED | OUTPATIENT
Start: 2022-07-20 | End: 2022-07-23

## 2022-07-20 RX ORDER — METOPROLOL TARTRATE 50 MG
12.5 TABLET ORAL
Refills: 0 | Status: DISCONTINUED | OUTPATIENT
Start: 2022-07-20 | End: 2022-07-25

## 2022-07-20 RX ADMIN — Medication 12.5 MILLIGRAM(S): at 17:22

## 2022-07-20 RX ADMIN — Medication 25 MILLIGRAM(S): at 06:14

## 2022-07-20 RX ADMIN — AMIODARONE HYDROCHLORIDE 400 MILLIGRAM(S): 400 TABLET ORAL at 17:22

## 2022-07-20 RX ADMIN — RIVAROXABAN 15 MILLIGRAM(S): KIT at 17:22

## 2022-07-20 RX ADMIN — AMIODARONE HYDROCHLORIDE 400 MILLIGRAM(S): 400 TABLET ORAL at 06:14

## 2022-07-20 RX ADMIN — MONTELUKAST 10 MILLIGRAM(S): 4 TABLET, CHEWABLE ORAL at 12:32

## 2022-07-20 RX ADMIN — ATORVASTATIN CALCIUM 10 MILLIGRAM(S): 80 TABLET, FILM COATED ORAL at 21:54

## 2022-07-20 RX ADMIN — PANTOPRAZOLE SODIUM 40 MILLIGRAM(S): 20 TABLET, DELAYED RELEASE ORAL at 06:14

## 2022-07-20 RX ADMIN — CEFTRIAXONE 100 MILLIGRAM(S): 500 INJECTION, POWDER, FOR SOLUTION INTRAMUSCULAR; INTRAVENOUS at 08:58

## 2022-07-20 NOTE — PROGRESS NOTE ADULT - SUBJECTIVE AND OBJECTIVE BOX
CHIEF COMPLAINT:Patient is a 87y old  Male who presents with a chief complaint of Chest pain.Pt feeling better.    	  REVIEW OF SYSTEMS:  CONSTITUTIONAL: No fever, weight loss, or fatigue  EYES: No eye pain, visual disturbances, or discharge  ENT:  No difficulty hearing, tinnitus, vertigo; No sinus or throat pain  NECK: No pain or stiffness  RESPIRATORY: No cough, wheezing, chills or hemoptysis; No Shortness of Breath  CARDIOVASCULAR: No chest pain, palpitations, passing out, dizziness, or leg swelling  GASTROINTESTINAL: No abdominal or epigastric pain. No nausea, vomiting, or hematemesis; No diarrhea or constipation. No melena or hematochezia.  GENITOURINARY: No dysuria, frequency, hematuria, or incontinence  NEUROLOGICAL: No headaches, memory loss, loss of strength, numbness, or tremors  SKIN: No itching, burning, rashes, or lesions   LYMPH Nodes: No enlarged glands  ENDOCRINE: No heat or cold intolerance; No hair loss  MUSCULOSKELETAL: No joint pain or swelling; No muscle, back, or extremity pain  PSYCHIATRIC: No depression, anxiety, mood swings, or difficulty sleeping  HEME/LYMPH: No easy bruising, or bleeding gums  ALLERGY AND IMMUNOLOGIC: No hives or eczema	        PHYSICAL EXAM:  T(C): 36.4 (07-20-22 @ 07:37), Max: 36.8 (07-19-22 @ 23:40)  HR: 70 (07-20-22 @ 07:37) (70 - 83)  BP: 138/57 (07-20-22 @ 07:37) (115/70 - 138/57)  RR: 19 (07-20-22 @ 07:37) (18 - 20)  SpO2: 97% (07-20-22 @ 07:37) (94% - 97%)  Wt(kg): --  I&O's Summary    19 Jul 2022 07:01  -  20 Jul 2022 07:00  --------------------------------------------------------  IN: 275 mL / OUT: 0 mL / NET: 275 mL        Appearance: Normal	  HEENT:   Normal oral mucosa, PERRL, EOMI	  Lymphatic: No lymphadenopathy  Cardiovascular: Normal S1 S2, No JVD, No murmurs, No edema  Respiratory: Lungs clear to auscultation	  Psychiatry: A & O x 3, Mood & affect appropriate  Gastrointestinal:  Soft, Non-tender, + BS	  Skin: No rashes, No ecchymoses, No cyanosis	  Neurologic: Non-focal  Extremities: Normal range of motion, No clubbing, cyanosis or edema  Vascular: Peripheral pulses palpable 2+ bilaterally    MEDICATIONS  (STANDING):  aMIOdarone    Tablet 400 milliGRAM(s) Oral two times a day  atorvastatin 10 milliGRAM(s) Oral at bedtime  cefTRIAXone   IVPB      insulin lispro (ADMELOG) corrective regimen sliding scale   SubCutaneous three times a day before meals  insulin lispro (ADMELOG) corrective regimen sliding scale   SubCutaneous at bedtime  metoprolol tartrate 12.5 milliGRAM(s) Oral two times a day  montelukast 10 milliGRAM(s) Oral daily  pantoprazole    Tablet 40 milliGRAM(s) Oral before breakfast  rivaroxaban 15 milliGRAM(s) Oral with dinner      TELEMETRY: 	afib 40-70's      LABS:	 	    Troponin I, High Sensitivity Result: 284.7 ng/L (07-18 @ 21:16)  Troponin I, High Sensitivity Result: 331.6 ng/L (07-18 @ 18:26)  Troponin I, High Sensitivity Result: 15.3 ng/L (07-18 @ 10:04)                            15.8   9.59  )-----------( 184      ( 20 Jul 2022 06:34 )             47.4     07-20    140  |  105  |  30<H>  ----------------------------<  139<H>  5.2   |  29  |  1.92<H>    Ca    9.6      20 Jul 2022 06:34  Phos  3.6     07-20  Mg     2.5     07-20    TPro  7.0  /  Alb  3.0<L>  /  TBili  0.7  /  DBili  x   /  AST  27  /  ALT  33  /  AlkPhos  58  07-20    proBNP: Serum Pro-Brain Natriuretic Peptide: 1225 pg/mL (07-18 @ 10:04)    Lipid Profile: Cholesterol 156  LDL --  HDL 43    Ldl calc 82    TSH: Thyroid Stimulating Hormone, Serum: 1.99 uU/mL (07-19 @ 06:58)      Transthoracic Echocardiogram (07.19.22 @ 07:16) >  OBSERVATIONS:  Mitral Valve: Normal mitral valve. Mild mitral  regurgitation.  Aortic Root: Aortic Root: 4.4 cm.  Aortic Valve: Normal trileaflet aortic valve.  Left Atrium: Normal left atrium. LA volume index = 25  cc/m2.  Left Ventricle: Normal left ventricular systolic function.  Moderate concentric left ventricular hypertrophy. Unable to  adequately assess diastolic function due to technical  aspects of this study.  Right Heart: Normalright atrium. Normal right ventricular  size and systolic function (TAPSE 2.3 cm). There is mild  tricuspid regurgitation. There is mild pulmonic  regurgitation.  Pericardium/PleuraNormal pericardium with no pericardial  effusion.  Hemodynamic: RV systolic pressure is moderately increased  at  48 mm Hg.

## 2022-07-20 NOTE — PROGRESS NOTE ADULT - PROBLEM SELECTOR PLAN 1
Comes with  4 days of worsening shortness of breath, palpitations, and chest pain  Found with EKG showing RVR 120s  s/p nitro, and felt better, the rate is now controlled  Patient's most recent echo in 2020 with EF 58%  No rate control at home, but on xarelto  BNP in 1200  Trop 331>284 - likely 2/2 demand ischemia  - CXR read - cardiomegaly, no pulmonary effusions  -Diuresis on stand by, Cr. 1.7, patient is in NAD, and no crackles heard, despite mild edema    - fu Echo - Mild MR, TR, MN, Moderate concentric left ventricular hypertrophy  - R/o tachy pamela syndrome  s/p dig .5mg ivx1  - cw amiodarone loading dec to 400mg bid, xarelto, dec  lopressor 12.5mg q12h  - cw Tele  Cardio Dr. Almeida following - recommendations noted and appreciated

## 2022-07-20 NOTE — PROGRESS NOTE ADULT - PROBLEM SELECTOR PLAN 1
Sleep study  PFTs as OP  oxygen supp  CPAP at night  Bronchodilators  Symbicort /4.5 mcgs 2 puffs po BID

## 2022-07-20 NOTE — PROGRESS NOTE ADULT - SUBJECTIVE AND OBJECTIVE BOX
Patient is a 87y old  Male who presents with a chief complaint of Chest pain (19 Jul 2022 16:48)    pt seen in icu [  ], reg med floor [   ], bed [  ], chair at bedside [   ], a+o x3 [  ], lethargic [  ],  nad [  ]    carlin [  ], ngt [  ], peg [  ], et tube [  ], cent line [  ], picc line [  ]        Allergies    No Known Allergies        Vitals    T(F): 97.6 (07-20-22 @ 07:37), Max: 98.2 (07-19-22 @ 23:40)  HR: 70 (07-20-22 @ 07:37) (70 - 83)  BP: 138/57 (07-20-22 @ 07:37) (115/70 - 138/57)  RR: 19 (07-20-22 @ 07:37) (18 - 20)  SpO2: 97% (07-20-22 @ 07:37) (94% - 97%)  Wt(kg): --  CAPILLARY BLOOD GLUCOSE      POCT Blood Glucose.: 145 mg/dL (20 Jul 2022 07:36)      Labs                          15.8   9.59  )-----------( 184      ( 20 Jul 2022 06:34 )             47.4       07-20    140  |  105  |  30<H>  ----------------------------<  139<H>  5.2   |  29  |  1.92<H>    Ca    9.6      20 Jul 2022 06:34  Phos  3.6     07-20  Mg     2.5     07-20    TPro  7.0  /  Alb  3.0<L>  /  TBili  0.7  /  DBili  x   /  AST  27  /  ALT  33  /  AlkPhos  58  07-20          Troponin I, High Sensitivity Result: 284.7 ng/L (07-18-22 @ 21:16)  Troponin I, High Sensitivity Result: 331.6 ng/L (07-18-22 @ 18:26)  Troponin I, High Sensitivity Result: 15.3 ng/L (07-18-22 @ 10:04)    < from: Transthoracic Echocardiogram (07.19.22 @ 07:16) >  CONCLUSIONS:  1. Normal mitral valve. Mild mitral regurgitation.  2. Normal trileaflet aortic valve.  3. Aortic Root: 4.4cm.    4. Normal left atrium.  LA volume index = 25 cc/m2.  5. Moderate concentric left ventricular hypertrophy.  6. Normal left ventricular systolic function.  7. Unable to adequately assess diastolic function due to  technical aspects of this study.  8. Normal right atrium.  9. Normal right ventricular size and systolic function  (TAPSE 2.3 cm).  10. RV systolic pressure is moderately increased at  48 mm  Hg.  11. There is mild tricuspid regurgitation.  12. There is mild pulmonic regurgitation.  13. Normal pericardium with no pericardial effusion.    < end of copied text >      Radiology Results      Meds    MEDICATIONS  (STANDING):  aMIOdarone    Tablet   Oral   aMIOdarone    Tablet 400 milliGRAM(s) Oral every 8 hours  atorvastatin 10 milliGRAM(s) Oral at bedtime  cefTRIAXone   IVPB      cefTRIAXone   IVPB 1000 milliGRAM(s) IV Intermittent once  insulin lispro (ADMELOG) corrective regimen sliding scale   SubCutaneous three times a day before meals  insulin lispro (ADMELOG) corrective regimen sliding scale   SubCutaneous at bedtime  metoprolol tartrate 25 milliGRAM(s) Oral every 8 hours  montelukast 10 milliGRAM(s) Oral daily  pantoprazole    Tablet 40 milliGRAM(s) Oral before breakfast  rivaroxaban 15 milliGRAM(s) Oral with dinner      MEDICATIONS  (PRN):      Physical Exam    Neuro :  no focal deficits  Respiratory: CTA B/L  CV: RRR, S1S2, no murmurs,   Abdominal: Soft, NT, ND +BS,  Extremities: No edema, + peripheral pulses    ASSESSMENT    Chest pain    Diabetes    Kidney tumor    KENRICK and COPD overlap syndrome    H/O: HTN (hypertension)        PLAN      amio load #2  ua positive  rocephin added  f/u ucx   monitor serum creat  echo with lvef wnl, pulm htn    Patient is a 87y old  Male who presents with a chief complaint of Chest pain (19 Jul 2022 16:48)    pt seen in tele [ x ], reg med floor [   ], bed [ x ], chair at bedside [   ], a+o x3 [ x ], lethargic [  ],  nad [ x ]      Allergies    No Known Allergies        Vitals    T(F): 97.6 (07-20-22 @ 07:37), Max: 98.2 (07-19-22 @ 23:40)  HR: 70 (07-20-22 @ 07:37) (70 - 83)  BP: 138/57 (07-20-22 @ 07:37) (115/70 - 138/57)  RR: 19 (07-20-22 @ 07:37) (18 - 20)  SpO2: 97% (07-20-22 @ 07:37) (94% - 97%)  Wt(kg): --  CAPILLARY BLOOD GLUCOSE      POCT Blood Glucose.: 145 mg/dL (20 Jul 2022 07:36)      Labs                          15.8   9.59  )-----------( 184      ( 20 Jul 2022 06:34 )             47.4       07-20    140  |  105  |  30<H>  ----------------------------<  139<H>  5.2   |  29  |  1.92<H>    Ca    9.6      20 Jul 2022 06:34  Phos  3.6     07-20  Mg     2.5     07-20    TPro  7.0  /  Alb  3.0<L>  /  TBili  0.7  /  DBili  x   /  AST  27  /  ALT  33  /  AlkPhos  58  07-20    A1C with Estimated Average Glucose in AM (07.19.22 @ 06:58)   A1C with Estimated Average Glucose Result: 6.4:       Troponin I, High Sensitivity Result: 284.7 ng/L (07-18-22 @ 21:16)  Troponin I, High Sensitivity Result: 331.6 ng/L (07-18-22 @ 18:26)  Troponin I, High Sensitivity Result: 15.3 ng/L (07-18-22 @ 10:04)    < from: Transthoracic Echocardiogram (07.19.22 @ 07:16) >  CONCLUSIONS:  1. Normal mitral valve. Mild mitral regurgitation.  2. Normal trileaflet aortic valve.  3. Aortic Root: 4.4cm.    4. Normal left atrium.  LA volume index = 25 cc/m2.  5. Moderate concentric left ventricular hypertrophy.  6. Normal left ventricular systolic function.  7. Unable to adequately assess diastolic function due to  technical aspects of this study.  8. Normal right atrium.  9. Normal right ventricular size and systolic function  (TAPSE 2.3 cm).  10. RV systolic pressure is moderately increased at  48 mm  Hg.  11. There is mild tricuspid regurgitation.  12. There is mild pulmonic regurgitation.  13. Normal pericardium with no pericardial effusion.    < end of copied text >      Radiology Results      Meds    MEDICATIONS  (STANDING):  aMIOdarone    Tablet   Oral   aMIOdarone    Tablet 400 milliGRAM(s) Oral every 8 hours  atorvastatin 10 milliGRAM(s) Oral at bedtime  cefTRIAXone   IVPB      cefTRIAXone   IVPB 1000 milliGRAM(s) IV Intermittent once  insulin lispro (ADMELOG) corrective regimen sliding scale   SubCutaneous three times a day before meals  insulin lispro (ADMELOG) corrective regimen sliding scale   SubCutaneous at bedtime  metoprolol tartrate 25 milliGRAM(s) Oral every 8 hours  montelukast 10 milliGRAM(s) Oral daily  pantoprazole    Tablet 40 milliGRAM(s) Oral before breakfast  rivaroxaban 15 milliGRAM(s) Oral with dinner      MEDICATIONS  (PRN):      Physical Exam    Neuro :  no focal deficits  Respiratory: CTA B/L  CV: RRR, S1S2, no murmurs,   Abdominal: Soft, NT, ND +BS,  Extremities: No edema, + peripheral pulses    ASSESSMENT    Neuro :  no focal deficits  Respiratory: CTA B/L  CV: RRR, S1S2, no murmurs,   Abdominal: Soft, NT, ND +BS,  Extremities: No edema, + peripheral pulses    ASSESSMENT    chest pain,    r/o acs,   sob,   r/o chf,   uti   amy  h/o COPD   KENRICK on CPAP,   diabetes.   A fib on Xarelto,   RCC s/p right nephrectomy many years ago,   CKD      PLAN    cont tele,   acs protocol,   aspirin, statin,   trop x 3 noted above  cardio f/u   echo with Normal left ventricular systolic function, pulm htn noted above  amiodarone loading day 2 dec to 400mg bid ,  dec  lopressor 12.5mg q12h 2nd to pamela cardia  r/o tachy-pamela synd.   ua positive  rocephin added  f/u ucx   monitor serum creat   pulm f/u   Sleep study  PFTs as OP  oxygen supp  CPAP at night  Bronchodilators  Symbicort /4.5 mcgs 2 puffs po BID.hgba1c 6.4 noted above  lispro ss,   cont current meds

## 2022-07-20 NOTE — PROGRESS NOTE ADULT - SUBJECTIVE AND OBJECTIVE BOX
PGY-1 Progress Note discussed with attending    PAGER #: [--------] TILL 5:00 PM  PLEASE CONTACT ON CALL TEAM:  - On Call Team (Please refer to Sri) FROM 5:00 PM - 8:30PM  - Nightfloat Team FROM 8:30 -7:30 AM    CHIEF COMPLAINT & BRIEF HOSPITAL COURSE:  Patient is an 85 yo PMH of COPD and KENRICK on CPAP, diabetes. A fib on Xarelto, RCC s/p right nephrectomy many years ago, CKD, Obese male who lives at home with wife and son, ambulates at baseline, comes in to the ED, due to gradual onset, worsening, intermittent shortness of breath, worse with exertion, associated with pressure like, mid, upper sternal chest discomfort, over the last 4 days. Patient reports feeling tired, and shortness of breath on waking up, that is chronic however, the last 4 days have been different. As patient was going down the stairs today, felt palpitations and chest pain, that prompted him to call his Pulm, who prompted him to call 911. In the EMS route, received aspirin and nitroglycerin which provided improvement in symptoms in the ED.    INTERVAL HPI/OVERNIGHT EVENTS:   Patient seen and examined at bedside. No acute events overnight. Patient has no complaints this AM. Sinus pause 2 seconds today.    MEDICATIONS  (STANDING):  aMIOdarone    Tablet 400 milliGRAM(s) Oral two times a day  atorvastatin 10 milliGRAM(s) Oral at bedtime  cefTRIAXone   IVPB      insulin lispro (ADMELOG) corrective regimen sliding scale   SubCutaneous three times a day before meals  insulin lispro (ADMELOG) corrective regimen sliding scale   SubCutaneous at bedtime  metoprolol tartrate 12.5 milliGRAM(s) Oral two times a day  montelukast 10 milliGRAM(s) Oral daily  pantoprazole    Tablet 40 milliGRAM(s) Oral before breakfast  rivaroxaban 15 milliGRAM(s) Oral with dinner    MEDICATIONS  (PRN):      REVIEW OF SYSTEMS:  CONSTITUTIONAL: No fever, weight loss, or fatigue  RESPIRATORY: No cough, wheezing, chills or hemoptysis; No shortness of breath  CARDIOVASCULAR: No chest pain, palpitations, dizziness, or leg swelling  GASTROINTESTINAL: No abdominal pain. No nausea, vomiting, or hematemesis; No diarrhea or constipation. No melena or hematochezia.  GENITOURINARY: No dysuria or hematuria, urinary frequency  NEUROLOGICAL: No headaches, memory loss, loss of strength, numbness, or tremors  SKIN: No itching, burning, rashes, or lesions     Vital Signs Last 24 Hrs  T(C): 36.7 (20 Jul 2022 15:06), Max: 36.8 (19 Jul 2022 23:40)  T(F): 98 (20 Jul 2022 15:06), Max: 98.2 (19 Jul 2022 23:40)  HR: 66 (20 Jul 2022 15:06) (66 - 81)  BP: 112/58 (20 Jul 2022 15:06) (112/58 - 138/57)  BP(mean): --  RR: 18 (20 Jul 2022 15:06) (18 - 20)  SpO2: 92% (20 Jul 2022 15:06) (92% - 98%)    Parameters below as of 20 Jul 2022 15:06  Patient On (Oxygen Delivery Method): room air        PHYSICAL EXAMINATION:  GENERAL: NAD, well built  HEAD:  Atraumatic, Normocephalic  EYES:  conjunctiva and sclera clear  NECK: Supple, No JVD, Normal thyroid  CHEST/LUNG: Clear to auscultation. Clear to percussion bilaterally; No rales, rhonchi, wheezing, or rubs  HEART: Regular rate and rhythm; No murmurs, rubs, or gallops  ABDOMEN: Soft, Nontender, Nondistended; Bowel sounds present  NERVOUS SYSTEM:  Alert & Oriented X3,    EXTREMITIES:  2+ Peripheral Pulses, No clubbing, cyanosis, or edema  SKIN: warm dry                          15.8   9.59  )-----------( 184      ( 20 Jul 2022 06:34 )             47.4     07-20    140  |  105  |  30<H>  ----------------------------<  139<H>  5.2   |  29  |  1.92<H>    Ca    9.6      20 Jul 2022 06:34  Phos  3.6     07-20  Mg     2.5     07-20    TPro  7.0  /  Alb  3.0<L>  /  TBili  0.7  /  DBili  x   /  AST  27  /  ALT  33  /  AlkPhos  58  07-20    LIVER FUNCTIONS - ( 20 Jul 2022 06:34 )  Alb: 3.0 g/dL / Pro: 7.0 g/dL / ALK PHOS: 58 U/L / ALT: 33 U/L DA / AST: 27 U/L / GGT: x                   CAPILLARY BLOOD GLUCOSE      RADIOLOGY & ADDITIONAL TESTS:      Chest Xray 7/18:  IMPRESSION: Cardiomegaly.  No large airspace consolidation or effusion.    TTE 7/19:  CONCLUSIONS:  1. Normal mitral valve. Mild mitral regurgitation.  2. Normal trileaflet aortic valve.  3. Aortic Root: 4.4 cm.    4. Normal left atrium.  LA volume index = 25 cc/m2.  5. Moderate concentric left ventricular hypertrophy.  6. Normal left ventricular systolic function.  7. Unable to adequately assess diastolic function due to  technical aspects of this study.  8. Normal right atrium.  9. Normal right ventricular size and systolic function  (TAPSE 2.3 cm).  10. RV systolic pressure is moderately increased at  48 mm  Hg.  11. There is mild tricuspid regurgitation.  12. There is mild pulmonic regurgitation.  13. Normal pericardium with no pericardial effusion.

## 2022-07-20 NOTE — PROGRESS NOTE ADULT - PROBLEM SELECTOR PLAN 2
Cardio eval  tele monitoring  Stress test Cardio follow up  tele monitoring  Stress test

## 2022-07-20 NOTE — PROGRESS NOTE ADULT - SUBJECTIVE AND OBJECTIVE BOX
Patient is a 87y old  Male who presents with a chief complaint of Chest pain (2022 09:48)    The patient is awake, alert, laying comfortably in bed in NAD. He is on RA. Feeling better      INTERVAL HPI/OVERNIGHT EVENTS:      VITAL SIGNS:  T(F): 97.6 (22 @ 07:37)  HR: 70 (22 @ 07:37)  BP: 138/57 (22 @ 07:37)  RR: 19 (22 @ 07:37)  SpO2: 97% (22 @ 07:37)  Wt(kg): --  I&O's Detail    2022 07:01  -  2022 07:00  --------------------------------------------------------  IN:    Oral Fluid: 275 mL  Total IN: 275 mL    OUT:  Total OUT: 0 mL    Total NET: 275 mL              REVIEW OF SYSTEMS:    CONSTITUTIONAL:  No fevers, chills, sweats    HEENT:  Eyes:  No diplopia or blurred vision. ENT:  No earache, sore throat or runny nose.    CARDIOVASCULAR:  No pressure, squeezing, tightness, or heaviness about the chest; no palpitations.    RESPIRATORY:  Per HPI    GASTROINTESTINAL:  No abdominal pain, nausea, vomiting or diarrhea.    GENITOURINARY:  No dysuria, frequency or urgency.    NEUROLOGIC:  No paresthesias, fasciculations, seizures or weakness.    PSYCHIATRIC:  No disorder of thought or mood.      PHYSICAL EXAM:    Constitutional: Well developed and nourished  Eyes:Perrla  ENMT: normal  Neck:supple  Respiratory: good air entry  Cardiovascular: S1 S2 regular  Gastrointestinal: Soft, Non tender  Extremities: No edema  Vascular:normal  Neurological:Awake, alert,Ox3  Musculoskeletal:Normal      MEDICATIONS  (STANDING):  aMIOdarone    Tablet 400 milliGRAM(s) Oral two times a day  atorvastatin 10 milliGRAM(s) Oral at bedtime  cefTRIAXone   IVPB      insulin lispro (ADMELOG) corrective regimen sliding scale   SubCutaneous three times a day before meals  insulin lispro (ADMELOG) corrective regimen sliding scale   SubCutaneous at bedtime  metoprolol tartrate 12.5 milliGRAM(s) Oral two times a day  montelukast 10 milliGRAM(s) Oral daily  pantoprazole    Tablet 40 milliGRAM(s) Oral before breakfast  rivaroxaban 15 milliGRAM(s) Oral with dinner    MEDICATIONS  (PRN):      Allergies    No Known Allergies    Intolerances        LABS:                        15.8   9.59  )-----------( 184      ( 2022 06:34 )             47.4     07-20    140  |  105  |  30<H>  ----------------------------<  139<H>  5.2   |  29  |  1.92<H>    Ca    9.6      2022 06:34  Phos  3.6     07-20  Mg     2.5     07-20    TPro  7.0  /  Alb  3.0<L>  /  TBili  0.7  /  DBili  x   /  AST  27  /  ALT  33  /  AlkPhos  58  07-20      Urinalysis Basic - ( 2022 21:40 )    Color: Yellow / Appearance: Clear / S.020 / pH: x  Gluc: x / Ketone: Negative  / Bili: Negative / Urobili: Negative   Blood: x / Protein: 100 / Nitrite: Negative   Leuk Esterase: Moderate / RBC: 0-2 /HPF / WBC 6-10 /HPF   Sq Epi: x / Non Sq Epi: Few /HPF / Bacteria: Few /HPF            CAPILLARY BLOOD GLUCOSE      POCT Blood Glucose.: 145 mg/dL (2022 07:36)  POCT Blood Glucose.: 170 mg/dL (2022 20:32)  POCT Blood Glucose.: 124 mg/dL (2022 16:57)  POCT Blood Glucose.: 108 mg/dL (2022 11:42)    pro-bnp 1225  @ 10:04     d-dimer --   @ 10:04      RADIOLOGY & ADDITIONAL TESTS:    CXR:  < from: Xray Chest 1 View- PORTABLE-Routine (Xray Chest 1 View- PORTABLE-Routine .) (22 @ 11:54) >  ROCEDURE DATE:  2022          INTERPRETATION:  Portable chest radiograph    CLINICAL INFORMATION: Chest pain    IMPRESSION: Cardiomegaly.  No large airspace consolidation or effusion.      Ct scan chest:    ekg;    echo: Patient is a 87y old  Male who presents with a chief complaint of Chest pain (2022 09:48)    The patient is awake, alert, laying comfortably in bed in NAD. He is on RA. Feeling better. Will go for stress test after Amiodarone loading      INTERVAL HPI/OVERNIGHT EVENTS:      VITAL SIGNS:  T(F): 97.6 (22 @ 07:37)  HR: 70 (22 @ 07:37)  BP: 138/57 (22 @ 07:37)  RR: 19 (22 @ 07:37)  SpO2: 97% (22 @ 07:37)  Wt(kg): --  I&O's Detail    2022 07:01  -  2022 07:00  --------------------------------------------------------  IN:    Oral Fluid: 275 mL  Total IN: 275 mL    OUT:  Total OUT: 0 mL    Total NET: 275 mL              REVIEW OF SYSTEMS:    CONSTITUTIONAL:  No fevers, chills, sweats    HEENT:  Eyes:  No diplopia or blurred vision. ENT:  No earache, sore throat or runny nose.    CARDIOVASCULAR:  No pressure, squeezing, tightness, or heaviness about the chest; no palpitations.    RESPIRATORY:  Per HPI    GASTROINTESTINAL:  No abdominal pain, nausea, vomiting or diarrhea.    GENITOURINARY:  No dysuria, frequency or urgency.    NEUROLOGIC:  No paresthesias, fasciculations, seizures or weakness.    PSYCHIATRIC:  No disorder of thought or mood.      PHYSICAL EXAM:    Constitutional: Well developed and nourished  Eyes:Perrla  ENMT: normal  Neck:supple  Respiratory: good air entry  Cardiovascular: S1 S2 regular  Gastrointestinal: Soft, Non tender  Extremities: No edema  Vascular:normal  Neurological:Awake, alert,Ox3  Musculoskeletal:Normal      MEDICATIONS  (STANDING):  aMIOdarone    Tablet 400 milliGRAM(s) Oral two times a day  atorvastatin 10 milliGRAM(s) Oral at bedtime  cefTRIAXone   IVPB      insulin lispro (ADMELOG) corrective regimen sliding scale   SubCutaneous three times a day before meals  insulin lispro (ADMELOG) corrective regimen sliding scale   SubCutaneous at bedtime  metoprolol tartrate 12.5 milliGRAM(s) Oral two times a day  montelukast 10 milliGRAM(s) Oral daily  pantoprazole    Tablet 40 milliGRAM(s) Oral before breakfast  rivaroxaban 15 milliGRAM(s) Oral with dinner    MEDICATIONS  (PRN):      Allergies    No Known Allergies    Intolerances        LABS:                        15.8   9.59  )-----------( 184      ( 2022 06:34 )             47.4     07-20    140  |  105  |  30<H>  ----------------------------<  139<H>  5.2   |  29  |  1.92<H>    Ca    9.6      2022 06:34  Phos  3.6       Mg     2.5         TPro  7.0  /  Alb  3.0<L>  /  TBili  0.7  /  DBili  x   /  AST  27  /  ALT  33  /  AlkPhos  58  07-20      Urinalysis Basic - ( 2022 21:40 )    Color: Yellow / Appearance: Clear / S.020 / pH: x  Gluc: x / Ketone: Negative  / Bili: Negative / Urobili: Negative   Blood: x / Protein: 100 / Nitrite: Negative   Leuk Esterase: Moderate / RBC: 0-2 /HPF / WBC 6-10 /HPF   Sq Epi: x / Non Sq Epi: Few /HPF / Bacteria: Few /HPF            CAPILLARY BLOOD GLUCOSE      POCT Blood Glucose.: 145 mg/dL (2022 07:36)  POCT Blood Glucose.: 170 mg/dL (2022 20:32)  POCT Blood Glucose.: 124 mg/dL (2022 16:57)  POCT Blood Glucose.: 108 mg/dL (2022 11:42)    pro-bnp 1225  @ 10:04     d-dimer --   @ 10:04      RADIOLOGY & ADDITIONAL TESTS:    CXR:  < from: Xray Chest 1 View- PORTABLE-Routine (Xray Chest 1 View- PORTABLE-Routine .) (22 @ 11:54) >  ROCEDURE DATE:  2022          INTERPRETATION:  Portable chest radiograph    CLINICAL INFORMATION: Chest pain    IMPRESSION: Cardiomegaly.  No large airspace consolidation or effusion.      Ct scan chest:    ekg;    echo:

## 2022-07-21 LAB
ANION GAP SERPL CALC-SCNC: 8 MMOL/L — SIGNIFICANT CHANGE UP (ref 5–17)
BUN SERPL-MCNC: 29 MG/DL — HIGH (ref 7–18)
CALCIUM SERPL-MCNC: 9.4 MG/DL — SIGNIFICANT CHANGE UP (ref 8.4–10.5)
CHLORIDE SERPL-SCNC: 106 MMOL/L — SIGNIFICANT CHANGE UP (ref 96–108)
CO2 SERPL-SCNC: 27 MMOL/L — SIGNIFICANT CHANGE UP (ref 22–31)
CREAT SERPL-MCNC: 2.04 MG/DL — HIGH (ref 0.5–1.3)
EGFR: 31 ML/MIN/1.73M2 — LOW
GLUCOSE BLDC GLUCOMTR-MCNC: 119 MG/DL — HIGH (ref 70–99)
GLUCOSE BLDC GLUCOMTR-MCNC: 127 MG/DL — HIGH (ref 70–99)
GLUCOSE BLDC GLUCOMTR-MCNC: 145 MG/DL — HIGH (ref 70–99)
GLUCOSE BLDC GLUCOMTR-MCNC: 145 MG/DL — HIGH (ref 70–99)
GLUCOSE SERPL-MCNC: 141 MG/DL — HIGH (ref 70–99)
HCT VFR BLD CALC: 51.1 % — HIGH (ref 39–50)
HGB BLD-MCNC: 17 G/DL — SIGNIFICANT CHANGE UP (ref 13–17)
MAGNESIUM SERPL-MCNC: 2.6 MG/DL — SIGNIFICANT CHANGE UP (ref 1.6–2.6)
MCHC RBC-ENTMCNC: 32.9 PG — SIGNIFICANT CHANGE UP (ref 27–34)
MCHC RBC-ENTMCNC: 33.3 GM/DL — SIGNIFICANT CHANGE UP (ref 32–36)
MCV RBC AUTO: 99 FL — SIGNIFICANT CHANGE UP (ref 80–100)
NRBC # BLD: 0 /100 WBCS — SIGNIFICANT CHANGE UP (ref 0–0)
PHOSPHATE SERPL-MCNC: 3.1 MG/DL — SIGNIFICANT CHANGE UP (ref 2.5–4.5)
PLATELET # BLD AUTO: 215 K/UL — SIGNIFICANT CHANGE UP (ref 150–400)
POTASSIUM SERPL-MCNC: 5.1 MMOL/L — SIGNIFICANT CHANGE UP (ref 3.5–5.3)
POTASSIUM SERPL-SCNC: 5.1 MMOL/L — SIGNIFICANT CHANGE UP (ref 3.5–5.3)
RBC # BLD: 5.16 M/UL — SIGNIFICANT CHANGE UP (ref 4.2–5.8)
RBC # FLD: 13.2 % — SIGNIFICANT CHANGE UP (ref 10.3–14.5)
SODIUM SERPL-SCNC: 141 MMOL/L — SIGNIFICANT CHANGE UP (ref 135–145)
WBC # BLD: 10.35 K/UL — SIGNIFICANT CHANGE UP (ref 3.8–10.5)
WBC # FLD AUTO: 10.35 K/UL — SIGNIFICANT CHANGE UP (ref 3.8–10.5)

## 2022-07-21 RX ORDER — SODIUM CHLORIDE 9 MG/ML
1000 INJECTION INTRAMUSCULAR; INTRAVENOUS; SUBCUTANEOUS
Refills: 0 | Status: DISCONTINUED | OUTPATIENT
Start: 2022-07-21 | End: 2022-07-25

## 2022-07-21 RX ADMIN — MONTELUKAST 10 MILLIGRAM(S): 4 TABLET, CHEWABLE ORAL at 12:20

## 2022-07-21 RX ADMIN — Medication 12.5 MILLIGRAM(S): at 18:00

## 2022-07-21 RX ADMIN — CEFTRIAXONE 100 MILLIGRAM(S): 500 INJECTION, POWDER, FOR SOLUTION INTRAMUSCULAR; INTRAVENOUS at 11:18

## 2022-07-21 RX ADMIN — ATORVASTATIN CALCIUM 10 MILLIGRAM(S): 80 TABLET, FILM COATED ORAL at 22:52

## 2022-07-21 RX ADMIN — SODIUM CHLORIDE 60 MILLILITER(S): 9 INJECTION INTRAMUSCULAR; INTRAVENOUS; SUBCUTANEOUS at 12:20

## 2022-07-21 RX ADMIN — RIVAROXABAN 15 MILLIGRAM(S): KIT at 18:10

## 2022-07-21 RX ADMIN — PANTOPRAZOLE SODIUM 40 MILLIGRAM(S): 20 TABLET, DELAYED RELEASE ORAL at 06:00

## 2022-07-21 RX ADMIN — AMIODARONE HYDROCHLORIDE 400 MILLIGRAM(S): 400 TABLET ORAL at 05:55

## 2022-07-21 RX ADMIN — AMIODARONE HYDROCHLORIDE 400 MILLIGRAM(S): 400 TABLET ORAL at 18:10

## 2022-07-21 RX ADMIN — Medication 12.5 MILLIGRAM(S): at 05:56

## 2022-07-21 NOTE — PROGRESS NOTE ADULT - SUBJECTIVE AND OBJECTIVE BOX
CHIEF COMPLAINT:Patient is a 87y old  Male who presents with a chief complaint of Chest pain .Pt appears comfortable.    	  REVIEW OF SYSTEMS:  CONSTITUTIONAL: No fever, weight loss, or fatigue  EYES: No eye pain, visual disturbances, or discharge  ENT:  No difficulty hearing, tinnitus, vertigo; No sinus or throat pain  NECK: No pain or stiffness  RESPIRATORY: No cough, wheezing, chills or hemoptysis; No Shortness of Breath  CARDIOVASCULAR: No chest pain, palpitations, passing out, dizziness, or leg swelling  GASTROINTESTINAL: No abdominal or epigastric pain. No nausea, vomiting, or hematemesis; No diarrhea or constipation. No melena or hematochezia.  GENITOURINARY: No dysuria, frequency, hematuria, or incontinence  NEUROLOGICAL: No headaches, memory loss, loss of strength, numbness, or tremors  SKIN: No itching, burning, rashes, or lesions   LYMPH Nodes: No enlarged glands  ENDOCRINE: No heat or cold intolerance; No hair loss  MUSCULOSKELETAL: No joint pain or swelling; No muscle, back, or extremity pain  PSYCHIATRIC: No depression, anxiety, mood swings, or difficulty sleeping  HEME/LYMPH: No easy bruising, or bleeding gums  ALLERGY AND IMMUNOLOGIC: No hives or eczema	      PHYSICAL EXAM:  T(C): 36.4 (07-21-22 @ 07:22), Max: 36.9 (07-21-22 @ 04:43)  HR: 77 (07-21-22 @ 07:22) (63 - 78)  BP: 154/73 (07-21-22 @ 07:22) (112/58 - 159/70)  RR: 19 (07-21-22 @ 07:22) (18 - 20)  SpO2: 95% (07-21-22 @ 07:22) (92% - 99%)    Tele-afib 60-80's    Appearance: Normal	  HEENT:   Normal oral mucosa, PERRL, EOMI	  Lymphatic: No lymphadenopathy  Cardiovascular: Normal S1 S2, No JVD, No murmurs, No edema  Respiratory: Lungs clear to auscultation	  Psychiatry: A & O x 3, Mood & affect appropriate  Gastrointestinal:  Soft, Non-tender, + BS	  Skin: No rashes, No ecchymoses, No cyanosis	  Neurologic: Non-focal  Extremities: Normal range of motion, No clubbing, cyanosis or edema  Vascular: Peripheral pulses palpable 2+ bilaterally    MEDICATIONS  (STANDING):  aMIOdarone    Tablet 400 milliGRAM(s) Oral two times a day  atorvastatin 10 milliGRAM(s) Oral at bedtime  cefTRIAXone   IVPB      cefTRIAXone   IVPB 1000 milliGRAM(s) IV Intermittent every 24 hours  insulin lispro (ADMELOG) corrective regimen sliding scale   SubCutaneous three times a day before meals  insulin lispro (ADMELOG) corrective regimen sliding scale   SubCutaneous at bedtime  metoprolol tartrate 12.5 milliGRAM(s) Oral two times a day  montelukast 10 milliGRAM(s) Oral daily  pantoprazole    Tablet 40 milliGRAM(s) Oral before breakfast  rivaroxaban 15 milliGRAM(s) Oral with dinner      	  LABS:	 	    Troponin I, High Sensitivity Result: 284.7 ng/L (07-18 @ 21:16)  Troponin I, High Sensitivity Result: 331.6 ng/L (07-18 @ 18:26)  Troponin I, High Sensitivity Result: 15.3 ng/L (07-18 @ 10:04)                            17.0   10.35 )-----------( 215      ( 21 Jul 2022 06:06 )             51.1     07-21    141  |  106  |  29<H>  ----------------------------<  141<H>  5.1   |  27  |  2.04<H>    Ca    9.4      21 Jul 2022 06:06  Phos  3.1     07-21  Mg     2.6     07-21    TPro  7.0  /  Alb  3.0<L>  /  TBili  0.7  /  DBili  x   /  AST  27  /  ALT  33  /  AlkPhos  58  07-20    proBNP: Serum Pro-Brain Natriuretic Peptide: 1225 pg/mL (07-18 @ 10:04)    Lipid Profile: Cholesterol 156  LDL --  HDL 43    Ldl calc 82  Ratio --      TSH: Thyroid Stimulating Hormone, Serum: 1.99 uU/mL (07-19 @ 06:58)

## 2022-07-21 NOTE — PROGRESS NOTE ADULT - SUBJECTIVE AND OBJECTIVE BOX
PGY-1 Progress Note discussed with attending    PAGER #: [--------] TILL 5:00 PM  PLEASE CONTACT ON CALL TEAM:  - On Call Team (Please refer to Sri) FROM 5:00 PM - 8:30PM  - Nightfloat Team FROM 8:30 -7:30 AM    CHIEF COMPLAINT & BRIEF HOSPITAL COURSE:   Patient is an 87 yo PMH of COPD and KENRICK on CPAP, diabetes. A fib on Xarelto, RCC s/p right nephrectomy many years ago, CKD, Obese male who lives at home with wife and son, ambulates at baseline, comes in to the ED, due to gradual onset, worsening, intermittent shortness of breath, worse with exertion, associated with pressure like, mid, upper sternal chest discomfort, over the last 4 days. Patient reports feeling tired, and shortness of breath on waking up, that is chronic however, the last 4 days have been different. As patient was going down the stairs today, felt palpitations and chest pain, that prompted him to call his Pulm, who prompted him to call 911. In the EMS route, received aspirin and nitroglycerin which provided improvement in symptoms in the ED.    INTERVAL HPI/OVERNIGHT EVENTS:   Patient seen and examined at bedside. No acute events overnight. Patient has no complaints today.    MEDICATIONS  (STANDING):  aMIOdarone    Tablet 400 milliGRAM(s) Oral two times a day  atorvastatin 10 milliGRAM(s) Oral at bedtime  cefTRIAXone   IVPB      cefTRIAXone   IVPB 1000 milliGRAM(s) IV Intermittent every 24 hours  insulin lispro (ADMELOG) corrective regimen sliding scale   SubCutaneous three times a day before meals  insulin lispro (ADMELOG) corrective regimen sliding scale   SubCutaneous at bedtime  metoprolol tartrate 12.5 milliGRAM(s) Oral two times a day  montelukast 10 milliGRAM(s) Oral daily  pantoprazole    Tablet 40 milliGRAM(s) Oral before breakfast  rivaroxaban 15 milliGRAM(s) Oral with dinner  sodium chloride 0.9%. 1000 milliLiter(s) (60 mL/Hr) IV Continuous <Continuous>    MEDICATIONS  (PRN):      REVIEW OF SYSTEMS:  CONSTITUTIONAL: No fever, weight loss, or fatigue  RESPIRATORY: No cough, wheezing, chills or hemoptysis; No shortness of breath  CARDIOVASCULAR: No chest pain, palpitations, dizziness, or leg swelling  GASTROINTESTINAL: No abdominal pain. No nausea, vomiting, or hematemesis; No diarrhea or constipation. No melena or hematochezia.  GENITOURINARY: No dysuria or hematuria, urinary frequency  NEUROLOGICAL: No headaches, memory loss, loss of strength, numbness, or tremors  SKIN: No itching, burning, rashes, or lesions     Vital Signs Last 24 Hrs  T(C): 36.6 (21 Jul 2022 17:16), Max: 36.9 (21 Jul 2022 04:43)  T(F): 97.8 (21 Jul 2022 17:16), Max: 98.4 (21 Jul 2022 04:43)  HR: 60 (21 Jul 2022 17:16) (60 - 77)  BP: 147/66 (21 Jul 2022 17:16) (138/72 - 159/70)  BP(mean): --  RR: 18 (21 Jul 2022 17:16) (18 - 20)  SpO2: 96% (21 Jul 2022 17:16) (93% - 99%)    Parameters below as of 21 Jul 2022 17:16  Patient On (Oxygen Delivery Method): room air        PHYSICAL EXAMINATION:  GENERAL: NAD, overweight  HEAD:  Atraumatic, Normocephalic  EYES:  conjunctiva and sclera clear  NECK: Supple, No JVD, Normal thyroid  CHEST/LUNG: Clear to auscultation. Clear to percussion bilaterally; No rales, rhonchi, wheezing, or rubs  HEART: Regular rate and rhythm; No murmurs, rubs, or gallops  ABDOMEN: Soft, Nontender, Nondistended; Bowel sounds present  NERVOUS SYSTEM:  Alert & Oriented X3,    EXTREMITIES:  2+ Peripheral Pulses, No clubbing, cyanosis, or edema  SKIN: warm dry                          17.0   10.35 )-----------( 215      ( 21 Jul 2022 06:06 )             51.1     07-21    141  |  106  |  29<H>  ----------------------------<  141<H>  5.1   |  27  |  2.04<H>    Ca    9.4      21 Jul 2022 06:06  Phos  3.1     07-21  Mg     2.6     07-21    TPro  7.0  /  Alb  3.0<L>  /  TBili  0.7  /  DBili  x   /  AST  27  /  ALT  33  /  AlkPhos  58  07-20    LIVER FUNCTIONS - ( 20 Jul 2022 06:34 )  Alb: 3.0 g/dL / Pro: 7.0 g/dL / ALK PHOS: 58 U/L / ALT: 33 U/L DA / AST: 27 U/L / GGT: x                   CAPILLARY BLOOD GLUCOSE      RADIOLOGY & ADDITIONAL TESTS:        Chest Xray 7/18:  IMPRESSION: Cardiomegaly.  No large airspace consolidation or effusion.    TTE 7/19:  CONCLUSIONS:  1. Normal mitral valve. Mild mitral regurgitation.  2. Normal trileaflet aortic valve.  3. Aortic Root: 4.4 cm.    4. Normal left atrium.  LA volume index = 25 cc/m2.  5. Moderate concentric left ventricular hypertrophy.  6. Normal left ventricular systolic function.  7. Unable to adequately assess diastolic function due to  technical aspects of this study.  8. Normal right atrium.  9. Normal right ventricular size and systolic function  (TAPSE 2.3 cm).  10. RV systolic pressure is moderately increased at  48 mm  Hg.  11. There is mild tricuspid regurgitation.  12. There is mild pulmonic regurgitation.  13. Normal pericardium with no pericardial effusion.

## 2022-07-21 NOTE — PROGRESS NOTE ADULT - SUBJECTIVE AND OBJECTIVE BOX
Patient is a 87y old  Male who presents with a chief complaint of Chest pain (2022 09:20)    The patient is awake, alert, sitting comfortably on chair in NAD. He is on RA. Clinically improving     INTERVAL HPI/OVERNIGHT EVENTS:      VITAL SIGNS:  T(F): 97.5 (22 @ 07:22)  HR: 77 (22 @ 07:22)  BP: 154/73 (22 @ 07:22)  RR: 19 (22 @ 07:22)  SpO2: 95% (22 @ 07:22)  Wt(kg): --  I&O's Detail          REVIEW OF SYSTEMS:    CONSTITUTIONAL:  No fevers, chills, sweats    HEENT:  Eyes:  No diplopia or blurred vision. ENT:  No earache, sore throat or runny nose.    CARDIOVASCULAR:  No pressure, squeezing, tightness, or heaviness about the chest; no palpitations.    RESPIRATORY:  Per HPI    GASTROINTESTINAL:  No abdominal pain, nausea, vomiting or diarrhea.    GENITOURINARY:  No dysuria, frequency or urgency.    NEUROLOGIC:  No paresthesias, fasciculations, seizures or weakness.    PSYCHIATRIC:  No disorder of thought or mood.      PHYSICAL EXAM:    Constitutional: Well developed and nourished  Eyes:Perrla  ENMT: normal  Neck:supple  Respiratory: good air entry  Cardiovascular: S1 S2 regular  Gastrointestinal: Soft, Non tender  Extremities: No edema  Vascular:normal  Neurological:Awake, alert,Ox3  Musculoskeletal:Normal      MEDICATIONS  (STANDING):  aMIOdarone    Tablet 400 milliGRAM(s) Oral two times a day  atorvastatin 10 milliGRAM(s) Oral at bedtime  cefTRIAXone   IVPB      cefTRIAXone   IVPB 1000 milliGRAM(s) IV Intermittent every 24 hours  insulin lispro (ADMELOG) corrective regimen sliding scale   SubCutaneous three times a day before meals  insulin lispro (ADMELOG) corrective regimen sliding scale   SubCutaneous at bedtime  metoprolol tartrate 12.5 milliGRAM(s) Oral two times a day  montelukast 10 milliGRAM(s) Oral daily  pantoprazole    Tablet 40 milliGRAM(s) Oral before breakfast  rivaroxaban 15 milliGRAM(s) Oral with dinner    MEDICATIONS  (PRN):      Allergies    No Known Allergies    Intolerances        LABS:                        17.0   10.35 )-----------( 215      ( 2022 06:06 )             51.1     07-21    141  |  106  |  29<H>  ----------------------------<  141<H>  5.1   |  27  |  2.04<H>    Ca    9.4      2022 06:06  Phos  3.1     07-  Mg     2.6     -    TPro  7.0  /  Alb  3.0<L>  /  TBili  0.7  /  DBili  x   /  AST  27  /  ALT  33  /  AlkPhos  58  07-20      Urinalysis Basic - ( 2022 21:40 )    Color: Yellow / Appearance: Clear / S.020 / pH: x  Gluc: x / Ketone: Negative  / Bili: Negative / Urobili: Negative   Blood: x / Protein: 100 / Nitrite: Negative   Leuk Esterase: Moderate / RBC: 0-2 /HPF / WBC 6-10 /HPF   Sq Epi: x / Non Sq Epi: Few /HPF / Bacteria: Few /HPF            CAPILLARY BLOOD GLUCOSE      POCT Blood Glucose.: 145 mg/dL (2022 08:07)  POCT Blood Glucose.: 138 mg/dL (2022 21:16)  POCT Blood Glucose.: 133 mg/dL (2022 16:47)  POCT Blood Glucose.: 143 mg/dL (2022 11:38)    pro-bnp 1225 -18 @ 10:04     d-dimer --   @ 10:04      RADIOLOGY & ADDITIONAL TESTS:    CXR:    Ct scan chest:    ekg;    echo: Patient is a 87y old  Male who presents with a chief complaint of Chest pain (2022 09:20)    The patient is awake, alert, sitting comfortably on chair in NAD. He is on RA. Clinically improving. For stress test in am     INTERVAL HPI/OVERNIGHT EVENTS:      VITAL SIGNS:  T(F): 97.5 (22 @ 07:22)  HR: 77 (22 @ 07:22)  BP: 154/73 (22 @ 07:22)  RR: 19 (22 @ 07:22)  SpO2: 95% (22 @ 07:22)  Wt(kg): --  I&O's Detail          REVIEW OF SYSTEMS:    CONSTITUTIONAL:  No fevers, chills, sweats    HEENT:  Eyes:  No diplopia or blurred vision. ENT:  No earache, sore throat or runny nose.    CARDIOVASCULAR:  No pressure, squeezing, tightness, or heaviness about the chest; no palpitations.    RESPIRATORY:  Per HPI    GASTROINTESTINAL:  No abdominal pain, nausea, vomiting or diarrhea.    GENITOURINARY:  No dysuria, frequency or urgency.    NEUROLOGIC:  No paresthesias, fasciculations, seizures or weakness.    PSYCHIATRIC:  No disorder of thought or mood.      PHYSICAL EXAM:    Constitutional: Well developed and nourished  Eyes:Perrla  ENMT: normal  Neck:supple  Respiratory: good air entry  Cardiovascular: S1 S2 regular  Gastrointestinal: Soft, Non tender  Extremities: No edema  Vascular:normal  Neurological:Awake, alert,Ox3  Musculoskeletal:Normal      MEDICATIONS  (STANDING):  aMIOdarone    Tablet 400 milliGRAM(s) Oral two times a day  atorvastatin 10 milliGRAM(s) Oral at bedtime  cefTRIAXone   IVPB      cefTRIAXone   IVPB 1000 milliGRAM(s) IV Intermittent every 24 hours  insulin lispro (ADMELOG) corrective regimen sliding scale   SubCutaneous three times a day before meals  insulin lispro (ADMELOG) corrective regimen sliding scale   SubCutaneous at bedtime  metoprolol tartrate 12.5 milliGRAM(s) Oral two times a day  montelukast 10 milliGRAM(s) Oral daily  pantoprazole    Tablet 40 milliGRAM(s) Oral before breakfast  rivaroxaban 15 milliGRAM(s) Oral with dinner    MEDICATIONS  (PRN):      Allergies    No Known Allergies    Intolerances        LABS:                        17.0   10.35 )-----------( 215      ( 2022 06:06 )             51.1     -    141  |  106  |  29<H>  ----------------------------<  141<H>  5.1   |  27  |  2.04<H>    Ca    9.4      2022 06:06  Phos  3.1       Mg     2.6         TPro  7.0  /  Alb  3.0<L>  /  TBili  0.7  /  DBili  x   /  AST  27  /  ALT  33  /  AlkPhos  58  07-20      Urinalysis Basic - ( 2022 21:40 )    Color: Yellow / Appearance: Clear / S.020 / pH: x  Gluc: x / Ketone: Negative  / Bili: Negative / Urobili: Negative   Blood: x / Protein: 100 / Nitrite: Negative   Leuk Esterase: Moderate / RBC: 0-2 /HPF / WBC 6-10 /HPF   Sq Epi: x / Non Sq Epi: Few /HPF / Bacteria: Few /HPF            CAPILLARY BLOOD GLUCOSE      POCT Blood Glucose.: 145 mg/dL (2022 08:07)  POCT Blood Glucose.: 138 mg/dL (2022 21:16)  POCT Blood Glucose.: 133 mg/dL (2022 16:47)  POCT Blood Glucose.: 143 mg/dL (2022 11:38)    pro-bnp 1225 -18 @ 10:04     d-dimer --   @ 10:04      RADIOLOGY & ADDITIONAL TESTS:    CXR:    Ct scan chest:    ekg;    echo:

## 2022-07-21 NOTE — PROGRESS NOTE ADULT - PROBLEM SELECTOR PLAN 2
sCr - 1.66 -> 2.04  urine sodium - 19  urine osmolality - 630  urine creatinine - 174  FENA <.1% = prerenal GREGORY  cw fluids

## 2022-07-21 NOTE — PROGRESS NOTE ADULT - PROBLEM SELECTOR PLAN 1
Comes with  4 days of worsening shortness of breath, palpitations, and chest pain  Found with EKG showing RVR 120s  s/p nitro, and felt better, the rate is now controlled  Patient's most recent echo in 2020 with EF 58%  No rate control at home, but on xarelto  BNP in 1200  Trop 331>284 - likely 2/2 demand ischemia  - CXR read - cardiomegaly, no pulmonary effusions  -Diuresis on stand by, Cr. 1.7, patient is in NAD, and no crackles heard, despite mild edema    - fu Echo - Mild MR, TR, WV, Moderate concentric left ventricular hypertrophy  - R/o tachy pamela syndrome  s/p dig .5mg ivx1  - cw amiodarone loading dec to 400mg bid, xarelto, dec  lopressor 12.5mg q12h  - fu stress test on 7/22  - cw Tele  Cardio Dr. Almeida following - recommendations noted and appreciated

## 2022-07-21 NOTE — PROGRESS NOTE ADULT - SUBJECTIVE AND OBJECTIVE BOX
Patient is a 87y old  Male who presents with a chief complaint of Chest pain (20 Jul 2022 13:52)    pt seen in tele [ x ], reg med floor [   ], bed [ x ], chair at bedside [   ], a+o x3 [ x ], lethargic [  ],    nad [ x ]      Allergies    No Known Allergies        Vitals    T(F): 98.4 (07-21-22 @ 04:43), Max: 98.4 (07-21-22 @ 04:43)  HR: 71 (07-21-22 @ 04:43) (63 - 78)  BP: 155/66 (07-21-22 @ 04:43) (112/58 - 159/70)  RR: 20 (07-21-22 @ 04:43) (18 - 20)  SpO2: 99% (07-21-22 @ 04:43) (92% - 99%)  Wt(kg): --  CAPILLARY BLOOD GLUCOSE      POCT Blood Glucose.: 138 mg/dL (20 Jul 2022 21:16)      Labs                          15.8   9.59  )-----------( 184      ( 20 Jul 2022 06:34 )             47.4       07-20    140  |  105  |  30<H>  ----------------------------<  139<H>  5.2   |  29  |  1.92<H>    Ca    9.6      20 Jul 2022 06:34  Phos  3.6     07-20  Mg     2.5     07-20    TPro  7.0  /  Alb  3.0<L>  /  TBili  0.7  /  DBili  x   /  AST  27  /  ALT  33  /  AlkPhos  58  07-20          Troponin I, High Sensitivity Result: 284.7 ng/L (07-18-22 @ 21:16)  Troponin I, High Sensitivity Result: 331.6 ng/L (07-18-22 @ 18:26)  Troponin I, High Sensitivity Result: 15.3 ng/L (07-18-22 @ 10:04)        Radiology Results      Meds    MEDICATIONS  (STANDING):  aMIOdarone    Tablet 400 milliGRAM(s) Oral two times a day  atorvastatin 10 milliGRAM(s) Oral at bedtime  cefTRIAXone   IVPB      cefTRIAXone   IVPB 1000 milliGRAM(s) IV Intermittent every 24 hours  insulin lispro (ADMELOG) corrective regimen sliding scale   SubCutaneous three times a day before meals  insulin lispro (ADMELOG) corrective regimen sliding scale   SubCutaneous at bedtime  metoprolol tartrate 12.5 milliGRAM(s) Oral two times a day  montelukast 10 milliGRAM(s) Oral daily  pantoprazole    Tablet 40 milliGRAM(s) Oral before breakfast  rivaroxaban 15 milliGRAM(s) Oral with dinner      MEDICATIONS  (PRN):      Physical Exam    Neuro :  no focal deficits  Respiratory: CTA B/L  CV: RRR, S1S2, no murmurs,   Abdominal: Soft, NT, ND +BS,  Extremities: No edema, + peripheral pulses    ASSESSMENT    Neuro :  no focal deficits  Respiratory: CTA B/L  CV: RRR, S1S2, no murmurs,   Abdominal: Soft, NT, ND +BS,  Extremities: No edema, + peripheral pulses    ASSESSMENT    chest pain,    r/o acs,   sob,   r/o chf,   uti   amy  h/o COPD   KENRICK on CPAP,   diabetes.   A fib on Xarelto,   RCC s/p right nephrectomy many years ago,   CKD      PLAN    cont tele,   acs protocol,   aspirin, statin,   trop x 3 noted above  cardio f/u   echo with Normal left ventricular systolic function, pulm htn noted above  amiodarone loading day 2 dec to 400mg bid ,  dec  lopressor 12.5mg q12h 2nd to pamela cardia  r/o tachy-pamela synd.   ua positive  rocephin added  f/u ucx   monitor serum creat   pulm f/u   Sleep study  PFTs as OP  oxygen supp  CPAP at night  Bronchodilators  Symbicort /4.5 mcgs 2 puffs po BID.hgba1c 6.4 noted above  lispro ss,   cont current meds         Patient is a 87y old  Male who presents with a chief complaint of Chest pain (20 Jul 2022 13:52)    pt seen in tele [ x ], reg med floor [   ], bed [ x ], chair at bedside [   ], a+o x3 [ x ], lethargic [  ],    nad [ x ]      Allergies    No Known Allergies        Vitals    T(F): 98.4 (07-21-22 @ 04:43), Max: 98.4 (07-21-22 @ 04:43)  HR: 71 (07-21-22 @ 04:43) (63 - 78)  BP: 155/66 (07-21-22 @ 04:43) (112/58 - 159/70)  RR: 20 (07-21-22 @ 04:43) (18 - 20)  SpO2: 99% (07-21-22 @ 04:43) (92% - 99%)  Wt(kg): --  CAPILLARY BLOOD GLUCOSE      POCT Blood Glucose.: 138 mg/dL (20 Jul 2022 21:16)      Labs                          15.8   9.59  )-----------( 184      ( 20 Jul 2022 06:34 )             47.4       07-20    140  |  105  |  30<H>  ----------------------------<  139<H>  5.2   |  29  |  1.92<H>    Ca    9.6      20 Jul 2022 06:34  Phos  3.6     07-20  Mg     2.5     07-20    TPro  7.0  /  Alb  3.0<L>  /  TBili  0.7  /  DBili  x   /  AST  27  /  ALT  33  /  AlkPhos  58  07-20          Troponin I, High Sensitivity Result: 284.7 ng/L (07-18-22 @ 21:16)  Troponin I, High Sensitivity Result: 331.6 ng/L (07-18-22 @ 18:26)  Troponin I, High Sensitivity Result: 15.3 ng/L (07-18-22 @ 10:04)        Radiology Results      Meds    MEDICATIONS  (STANDING):  aMIOdarone    Tablet 400 milliGRAM(s) Oral two times a day  atorvastatin 10 milliGRAM(s) Oral at bedtime  cefTRIAXone   IVPB      cefTRIAXone   IVPB 1000 milliGRAM(s) IV Intermittent every 24 hours  insulin lispro (ADMELOG) corrective regimen sliding scale   SubCutaneous three times a day before meals  insulin lispro (ADMELOG) corrective regimen sliding scale   SubCutaneous at bedtime  metoprolol tartrate 12.5 milliGRAM(s) Oral two times a day  montelukast 10 milliGRAM(s) Oral daily  pantoprazole    Tablet 40 milliGRAM(s) Oral before breakfast  rivaroxaban 15 milliGRAM(s) Oral with dinner      MEDICATIONS  (PRN):      Physical Exam    Neuro :  no focal deficits  Respiratory: CTA B/L  CV: RRR, S1S2, no murmurs,   Abdominal: Soft, NT, ND +BS,  Extremities: No edema, + peripheral pulses    ASSESSMENT    Neuro :  no focal deficits  Respiratory: CTA B/L  CV: RRR, S1S2, no murmurs,   Abdominal: Soft, NT, ND +BS,  Extremities: No edema, + peripheral pulses    ASSESSMENT    chest pain,    r/o acs,   sob,   r/o chf,   uti   amy  h/o COPD   KENRICK on CPAP,   diabetes.   A fib on Xarelto,   RCC s/p right nephrectomy many years ago,   CKD      PLAN    cont tele,   acs protocol,   aspirin, statin,   trop x 3 noted above  cardio f/u   echo with Normal left ventricular systolic function, pulm htn noted above  amiodarone 400mg bid loading day 3,  cont  lopressor 12.5mg q12h 2nd to pamela cardia  r/o tachy-pamela synd.   stress test in am   ua positive  rocephin added  f/u ucx   monitor serum creat   pulm f/u   Sleep study  PFTs as OP  oxygen supp  CPAP at night  Bronchodilators  Symbicort /4.5 mcgs 2 puffs po BID.hgba1c 6.4 noted above  lispro ss,   cont current meds

## 2022-07-22 DIAGNOSIS — E78.5 HYPERLIPIDEMIA, UNSPECIFIED: ICD-10-CM

## 2022-07-22 DIAGNOSIS — N17.9 ACUTE KIDNEY FAILURE, UNSPECIFIED: ICD-10-CM

## 2022-07-22 LAB
ALBUMIN SERPL ELPH-MCNC: 3.1 G/DL — LOW (ref 3.5–5)
ALP SERPL-CCNC: 61 U/L — SIGNIFICANT CHANGE UP (ref 40–120)
ALT FLD-CCNC: 37 U/L DA — SIGNIFICANT CHANGE UP (ref 10–60)
ANION GAP SERPL CALC-SCNC: 5 MMOL/L — SIGNIFICANT CHANGE UP (ref 5–17)
AST SERPL-CCNC: 29 U/L — SIGNIFICANT CHANGE UP (ref 10–40)
BILIRUB SERPL-MCNC: 0.7 MG/DL — SIGNIFICANT CHANGE UP (ref 0.2–1.2)
BUN SERPL-MCNC: 23 MG/DL — HIGH (ref 7–18)
CALCIUM SERPL-MCNC: 9.2 MG/DL — SIGNIFICANT CHANGE UP (ref 8.4–10.5)
CHLORIDE SERPL-SCNC: 108 MMOL/L — SIGNIFICANT CHANGE UP (ref 96–108)
CO2 SERPL-SCNC: 28 MMOL/L — SIGNIFICANT CHANGE UP (ref 22–31)
CREAT SERPL-MCNC: 1.81 MG/DL — HIGH (ref 0.5–1.3)
EGFR: 36 ML/MIN/1.73M2 — LOW
GLUCOSE BLDC GLUCOMTR-MCNC: 130 MG/DL — HIGH (ref 70–99)
GLUCOSE BLDC GLUCOMTR-MCNC: 140 MG/DL — HIGH (ref 70–99)
GLUCOSE BLDC GLUCOMTR-MCNC: 163 MG/DL — HIGH (ref 70–99)
GLUCOSE BLDC GLUCOMTR-MCNC: 172 MG/DL — HIGH (ref 70–99)
GLUCOSE SERPL-MCNC: 154 MG/DL — HIGH (ref 70–99)
HCT VFR BLD CALC: 48 % — SIGNIFICANT CHANGE UP (ref 39–50)
HGB BLD-MCNC: 16 G/DL — SIGNIFICANT CHANGE UP (ref 13–17)
MAGNESIUM SERPL-MCNC: 2.5 MG/DL — SIGNIFICANT CHANGE UP (ref 1.6–2.6)
MCHC RBC-ENTMCNC: 32.5 PG — SIGNIFICANT CHANGE UP (ref 27–34)
MCHC RBC-ENTMCNC: 33.3 GM/DL — SIGNIFICANT CHANGE UP (ref 32–36)
MCV RBC AUTO: 97.6 FL — SIGNIFICANT CHANGE UP (ref 80–100)
NRBC # BLD: 0 /100 WBCS — SIGNIFICANT CHANGE UP (ref 0–0)
PHOSPHATE SERPL-MCNC: 2.5 MG/DL — SIGNIFICANT CHANGE UP (ref 2.5–4.5)
PLATELET # BLD AUTO: 213 K/UL — SIGNIFICANT CHANGE UP (ref 150–400)
POTASSIUM SERPL-MCNC: 5.1 MMOL/L — SIGNIFICANT CHANGE UP (ref 3.5–5.3)
POTASSIUM SERPL-SCNC: 5.1 MMOL/L — SIGNIFICANT CHANGE UP (ref 3.5–5.3)
PROT SERPL-MCNC: 7.3 G/DL — SIGNIFICANT CHANGE UP (ref 6–8.3)
RBC # BLD: 4.92 M/UL — SIGNIFICANT CHANGE UP (ref 4.2–5.8)
RBC # FLD: 13.2 % — SIGNIFICANT CHANGE UP (ref 10.3–14.5)
SODIUM SERPL-SCNC: 141 MMOL/L — SIGNIFICANT CHANGE UP (ref 135–145)
WBC # BLD: 13.07 K/UL — HIGH (ref 3.8–10.5)
WBC # FLD AUTO: 13.07 K/UL — HIGH (ref 3.8–10.5)

## 2022-07-22 RX ORDER — HYDRALAZINE HCL 50 MG
25 TABLET ORAL THREE TIMES A DAY
Refills: 0 | Status: DISCONTINUED | OUTPATIENT
Start: 2022-07-22 | End: 2022-07-25

## 2022-07-22 RX ADMIN — CEFTRIAXONE 100 MILLIGRAM(S): 500 INJECTION, POWDER, FOR SOLUTION INTRAMUSCULAR; INTRAVENOUS at 10:30

## 2022-07-22 RX ADMIN — PANTOPRAZOLE SODIUM 40 MILLIGRAM(S): 20 TABLET, DELAYED RELEASE ORAL at 05:24

## 2022-07-22 RX ADMIN — Medication 25 MILLIGRAM(S): at 13:03

## 2022-07-22 RX ADMIN — MONTELUKAST 10 MILLIGRAM(S): 4 TABLET, CHEWABLE ORAL at 12:26

## 2022-07-22 RX ADMIN — ATORVASTATIN CALCIUM 10 MILLIGRAM(S): 80 TABLET, FILM COATED ORAL at 21:17

## 2022-07-22 RX ADMIN — RIVAROXABAN 15 MILLIGRAM(S): KIT at 17:29

## 2022-07-22 RX ADMIN — Medication 12.5 MILLIGRAM(S): at 05:24

## 2022-07-22 RX ADMIN — Medication 25 MILLIGRAM(S): at 21:18

## 2022-07-22 RX ADMIN — Medication 12.5 MILLIGRAM(S): at 17:29

## 2022-07-22 RX ADMIN — AMIODARONE HYDROCHLORIDE 400 MILLIGRAM(S): 400 TABLET ORAL at 05:24

## 2022-07-22 RX ADMIN — AMIODARONE HYDROCHLORIDE 400 MILLIGRAM(S): 400 TABLET ORAL at 17:29

## 2022-07-22 NOTE — PROGRESS NOTE ADULT - SUBJECTIVE AND OBJECTIVE BOX
Patient is a 87y old  Male who presents with a chief complaint of Chest pain (22 Jul 2022 06:30)    The patient is gone for stress test.       INTERVAL HPI/OVERNIGHT EVENTS:      VITAL SIGNS:  T(F): 98.2 (07-22-22 @ 07:24)  HR: 76 (07-22-22 @ 07:24)  BP: 162/64 (07-22-22 @ 07:24)  RR: 19 (07-22-22 @ 07:24)  SpO2: 95% (07-22-22 @ 07:24)  Wt(kg): --  I&O's Detail    21 Jul 2022 07:01  -  22 Jul 2022 07:00  --------------------------------------------------------  IN:    IV PiggyBack: 50 mL    sodium chloride 0.9%: 420 mL  Total IN: 470 mL    OUT:    Voided (mL): 900 mL  Total OUT: 900 mL    Total NET: -430 mL              REVIEW OF SYSTEMS:    CONSTITUTIONAL:  No fevers, chills, sweats    HEENT:  Eyes:  No diplopia or blurred vision. ENT:  No earache, sore throat or runny nose.    CARDIOVASCULAR:  No pressure, squeezing, tightness, or heaviness about the chest; no palpitations.    RESPIRATORY:  Per HPI    GASTROINTESTINAL:  No abdominal pain, nausea, vomiting or diarrhea.    GENITOURINARY:  No dysuria, frequency or urgency.    NEUROLOGIC:  No paresthesias, fasciculations, seizures or weakness.    PSYCHIATRIC:  No disorder of thought or mood.      PHYSICAL EXAM:    Constitutional: Well developed and nourished  Eyes:Perrla  ENMT: normal  Neck:supple  Respiratory: good air entry  Cardiovascular: S1 S2 regular  Gastrointestinal: Soft, Non tender  Extremities: No edema  Vascular:normal  Neurological:Awake, alert,Ox3  Musculoskeletal:Normal      MEDICATIONS  (STANDING):  aMIOdarone    Tablet 400 milliGRAM(s) Oral two times a day  atorvastatin 10 milliGRAM(s) Oral at bedtime  cefTRIAXone   IVPB      cefTRIAXone   IVPB 1000 milliGRAM(s) IV Intermittent every 24 hours  insulin lispro (ADMELOG) corrective regimen sliding scale   SubCutaneous three times a day before meals  insulin lispro (ADMELOG) corrective regimen sliding scale   SubCutaneous at bedtime  metoprolol tartrate 12.5 milliGRAM(s) Oral two times a day  montelukast 10 milliGRAM(s) Oral daily  pantoprazole    Tablet 40 milliGRAM(s) Oral before breakfast  rivaroxaban 15 milliGRAM(s) Oral with dinner  sodium chloride 0.9%. 1000 milliLiter(s) (60 mL/Hr) IV Continuous <Continuous>    MEDICATIONS  (PRN):      Allergies    No Known Allergies    Intolerances        LABS:                        16.0   13.07 )-----------( 213      ( 22 Jul 2022 06:44 )             48.0     07-22    141  |  108  |  23<H>  ----------------------------<  154<H>  5.1   |  28  |  1.81<H>    Ca    9.2      22 Jul 2022 06:44  Phos  2.5     07-22  Mg     2.5     07-22    TPro  7.3  /  Alb  3.1<L>  /  TBili  0.7  /  DBili  x   /  AST  29  /  ALT  37  /  AlkPhos  61  07-22              CAPILLARY BLOOD GLUCOSE      POCT Blood Glucose.: 163 mg/dL (22 Jul 2022 07:41)  POCT Blood Glucose.: 127 mg/dL (21 Jul 2022 21:08)  POCT Blood Glucose.: 119 mg/dL (21 Jul 2022 16:35)  POCT Blood Glucose.: 145 mg/dL (21 Jul 2022 11:51)    pro-bnp 1225 07-18 @ 10:04     d-dimer --  07-18 @ 10:04      RADIOLOGY & ADDITIONAL TESTS:    CXR:    Ct scan chest:    ekg;    echo: Patient is a 87y old  Male who presents with a chief complaint of Chest pain (22 Jul 2022 06:30)  Awake, alert, comfortable in NAD. For stress test today      INTERVAL HPI/OVERNIGHT EVENTS:      VITAL SIGNS:  T(F): 98.2 (07-22-22 @ 07:24)  HR: 76 (07-22-22 @ 07:24)  BP: 162/64 (07-22-22 @ 07:24)  RR: 19 (07-22-22 @ 07:24)  SpO2: 95% (07-22-22 @ 07:24)  Wt(kg): --  I&O's Detail    21 Jul 2022 07:01  -  22 Jul 2022 07:00  --------------------------------------------------------  IN:    IV PiggyBack: 50 mL    sodium chloride 0.9%: 420 mL  Total IN: 470 mL    OUT:    Voided (mL): 900 mL  Total OUT: 900 mL    Total NET: -430 mL              REVIEW OF SYSTEMS:    CONSTITUTIONAL:  No fevers, chills, sweats    HEENT:  Eyes:  No diplopia or blurred vision. ENT:  No earache, sore throat or runny nose.    CARDIOVASCULAR:  No pressure, squeezing, tightness, or heaviness about the chest; no palpitations.    RESPIRATORY:  Per HPI    GASTROINTESTINAL:  No abdominal pain, nausea, vomiting or diarrhea.    GENITOURINARY:  No dysuria, frequency or urgency.    NEUROLOGIC:  No paresthesias, fasciculations, seizures or weakness.    PSYCHIATRIC:  No disorder of thought or mood.      PHYSICAL EXAM:    Constitutional: Well developed and nourished  Eyes:Perrla  ENMT: normal  Neck:supple  Respiratory: good air entry  Cardiovascular: S1 S2 regular  Gastrointestinal: Soft, Non tender  Extremities: No edema  Vascular:normal  Neurological:Awake, alert,Ox3  Musculoskeletal:Normal      MEDICATIONS  (STANDING):  aMIOdarone    Tablet 400 milliGRAM(s) Oral two times a day  atorvastatin 10 milliGRAM(s) Oral at bedtime  cefTRIAXone   IVPB      cefTRIAXone   IVPB 1000 milliGRAM(s) IV Intermittent every 24 hours  insulin lispro (ADMELOG) corrective regimen sliding scale   SubCutaneous three times a day before meals  insulin lispro (ADMELOG) corrective regimen sliding scale   SubCutaneous at bedtime  metoprolol tartrate 12.5 milliGRAM(s) Oral two times a day  montelukast 10 milliGRAM(s) Oral daily  pantoprazole    Tablet 40 milliGRAM(s) Oral before breakfast  rivaroxaban 15 milliGRAM(s) Oral with dinner  sodium chloride 0.9%. 1000 milliLiter(s) (60 mL/Hr) IV Continuous <Continuous>    MEDICATIONS  (PRN):      Allergies    No Known Allergies    Intolerances        LABS:                        16.0   13.07 )-----------( 213      ( 22 Jul 2022 06:44 )             48.0     07-22    141  |  108  |  23<H>  ----------------------------<  154<H>  5.1   |  28  |  1.81<H>    Ca    9.2      22 Jul 2022 06:44  Phos  2.5     07-22  Mg     2.5     07-22    TPro  7.3  /  Alb  3.1<L>  /  TBili  0.7  /  DBili  x   /  AST  29  /  ALT  37  /  AlkPhos  61  07-22              CAPILLARY BLOOD GLUCOSE      POCT Blood Glucose.: 163 mg/dL (22 Jul 2022 07:41)  POCT Blood Glucose.: 127 mg/dL (21 Jul 2022 21:08)  POCT Blood Glucose.: 119 mg/dL (21 Jul 2022 16:35)  POCT Blood Glucose.: 145 mg/dL (21 Jul 2022 11:51)    pro-bnp 1225 07-18 @ 10:04     d-dimer --  07-18 @ 10:04      RADIOLOGY & ADDITIONAL TESTS:    CXR:    Ct scan chest:    ekg;    echo:

## 2022-07-22 NOTE — PROGRESS NOTE ADULT - SUBJECTIVE AND OBJECTIVE BOX
CHIEF COMPLAINT:Patient is a 87y old  Male who presents with a chief complaint of Chest pain.Pt appears comfortable.    	  REVIEW OF SYSTEMS:  CONSTITUTIONAL: No fever, weight loss, or fatigue  EYES: No eye pain, visual disturbances, or discharge  ENT:  No difficulty hearing, tinnitus, vertigo; No sinus or throat pain  NECK: No pain or stiffness  RESPIRATORY: No cough, wheezing, chills or hemoptysis; No Shortness of Breath  CARDIOVASCULAR: No chest pain, palpitations, passing out, dizziness, or leg swelling  GASTROINTESTINAL: No abdominal or epigastric pain. No nausea, vomiting, or hematemesis; No diarrhea or constipation. No melena or hematochezia.  GENITOURINARY: No dysuria, frequency, hematuria, or incontinence  NEUROLOGICAL: No headaches, memory loss, loss of strength, numbness, or tremors  SKIN: No itching, burning, rashes, or lesions   LYMPH Nodes: No enlarged glands  ENDOCRINE: No heat or cold intolerance; No hair loss  MUSCULOSKELETAL: No joint pain or swelling; No muscle, back, or extremity pain  PSYCHIATRIC: No depression, anxiety, mood swings, or difficulty sleeping  HEME/LYMPH: No easy bruising, or bleeding gums  ALLERGY AND IMMUNOLOGIC: No hives or eczema	      PHYSICAL EXAM:  T(C): 36.8 (07-22-22 @ 07:24), Max: 36.9 (07-21-22 @ 11:35)  HR: 76 (07-22-22 @ 07:24) (60 - 80)  BP: 162/64 (07-22-22 @ 07:24) (138/72 - 162/64)  RR: 19 (07-22-22 @ 07:24) (18 - 19)  SpO2: 95% (07-22-22 @ 07:24) (94% - 98%)  Wt(kg): --  I&O's Summary    21 Jul 2022 07:01  -  22 Jul 2022 07:00  --------------------------------------------------------  IN: 470 mL / OUT: 900 mL / NET: -430 mL        Appearance: Normal	  HEENT:   Normal oral mucosa, PERRL, EOMI	  Lymphatic: No lymphadenopathy  Cardiovascular: Normal S1 S2, No JVD, No murmurs, No edema  Respiratory: Lungs clear to auscultation	  Psychiatry: A & O x 3, Mood & affect appropriate  Gastrointestinal:  Soft, Non-tender, + BS	  Skin: No rashes, No ecchymoses, No cyanosis	  Neurologic: Non-focal  Extremities: Normal range of motion, No clubbing, cyanosis or edema  Vascular: Peripheral pulses palpable 2+ bilaterally    MEDICATIONS  (STANDING):  aMIOdarone    Tablet 400 milliGRAM(s) Oral two times a day  atorvastatin 10 milliGRAM(s) Oral at bedtime  cefTRIAXone   IVPB      cefTRIAXone   IVPB 1000 milliGRAM(s) IV Intermittent every 24 hours  hydrALAZINE 25 milliGRAM(s) Oral three times a day  insulin lispro (ADMELOG) corrective regimen sliding scale   SubCutaneous three times a day before meals  insulin lispro (ADMELOG) corrective regimen sliding scale   SubCutaneous at bedtime  metoprolol tartrate 12.5 milliGRAM(s) Oral two times a day  montelukast 10 milliGRAM(s) Oral daily  pantoprazole    Tablet 40 milliGRAM(s) Oral before breakfast  rivaroxaban 15 milliGRAM(s) Oral with dinner  sodium chloride 0.9%. 1000 milliLiter(s) (60 mL/Hr) IV Continuous <Continuous>      TELEMETRY: afib 60-90's	    	  	  LABS:	 	                      16.0   13.07 )-----------( 213      ( 22 Jul 2022 06:44 )             48.0     07-22    141  |  108  |  23<H>  ----------------------------<  154<H>  5.1   |  28  |  1.81<H>    Ca    9.2      22 Jul 2022 06:44  Phos  2.5     07-22  Mg     2.5     07-22    TPro  7.3  /  Alb  3.1<L>  /  TBili  0.7  /  DBili  x   /  AST  29  /  ALT  37  /  AlkPhos  61  07-22    proBNP: Serum Pro-Brain Natriuretic Peptide: 1225 pg/mL (07-18 @ 10:04)    Lipid Profile: Cholesterol 156  LDL --  HDL 43    Ldl calc 82  Ratio --      TSH: Thyroid Stimulating Hormone, Serum: 1.99 uU/mL (07-19 @ 06:58)

## 2022-07-22 NOTE — PROGRESS NOTE ADULT - PROBLEM SELECTOR PLAN 1
Sleep study  PFTs as OP  oxygen supp  CPAP at night  Bronchodilators  Symbicort /4.5 mcgs 2 puffs po BID Sleep study as OP  PFTs as OP  oxygen supp  CPAP at night  Bronchodilators  Symbicort /4.5 mcgs 2 puffs po BID

## 2022-07-22 NOTE — PROGRESS NOTE ADULT - PROBLEM SELECTOR PLAN 2
sCr - 1.66 -> 2.04  urine sodium - 19  urine osmolality - 630  urine creatinine - 174  FENA <.1% = prerenal GREGORY  cw fluids sCr - 2.04 -> 1.81  urine sodium - 19  urine osmolality - 630  urine creatinine - 174  FENA <.1% = prerenal GREGORY  cw fluids  Unknown base line

## 2022-07-22 NOTE — PROGRESS NOTE ADULT - PROBLEM SELECTOR PLAN 1
Comes with  4 days of worsening shortness of breath, palpitations, and chest pain  Found with EKG showing RVR 120s  s/p nitro, and felt better, the rate is now controlled  Patient's most recent echo in 2020 with EF 58%  No rate control at home, but on xarelto  BNP in 1200  Trop 331>284 - likely 2/2 demand ischemia  - CXR read - cardiomegaly, no pulmonary effusions  -Diuresis on stand by, Cr. 1.7, patient is in NAD, and no crackles heard, despite mild edema    - fu Echo - Mild MR, TR, OK, Moderate concentric left ventricular hypertrophy  - R/o tachy pamela syndrome  s/p dig .5mg ivx1  - cw amiodarone loading dec to 400mg bid, xarelto, dec  lopressor 12.5mg q12h  - fu stress test on 7/22  - cw Tele  Cardio Dr. Almeida following - recommendations noted and appreciated Comes with  4 days of worsening shortness of breath, palpitations, and chest pain  Found with EKG showing RVR 120s  s/p nitro, and felt better, the rate is now controlled  Patient's most recent echo in 2020 with EF 58%  No rate control at home, but on xarelto  BNP in 1200  Trop 331>284 - likely 2/2 demand ischemia  - CXR read - cardiomegaly, no pulmonary effusions  -Diuresis on stand by, Cr. 1.7, patient is in NAD, and no crackles heard, despite mild edema    - fu Echo - Mild MR, TR, MI, Moderate concentric left ventricular hypertrophy  - R/o tachy pamela syndrome  - s/p dig .5mg ivx1  - cw amiodarone loading dec to 400mg bid, xarelto, lopressor 12.5mg q12h  - fu stress test r/o ischemia  - cw Tele - monitor tachy pamela syndrome  Cardio Dr. Almeida following - recommendations noted and appreciated

## 2022-07-22 NOTE — PROGRESS NOTE ADULT - PROBLEM SELECTOR PLAN 7
dereje Ron Patient is on hydralazine at home  s/p nitro, BP 140s in the ED  BP 150s on 7/22  start hydralazine 25mg TID

## 2022-07-22 NOTE — PROGRESS NOTE ADULT - SUBJECTIVE AND OBJECTIVE BOX
PGY-1 Progress Note discussed with attending    PAGER #: [[phone]] TILL 5:00 PM  PLEASE CONTACT ON CALL TEAM:  - On Call Team (Please refer to Sri) FROM 5:00 PM - 8:30PM  - Nightfloat Team FROM 8:30 -7:30 AM    CHIEF COMPLAINT & BRIEF HOSPITAL COURSE:  Patient is an 87 yo PMH of COPD and KENRICK on CPAP, diabetes. A fib on Xarelto, RCC s/p right nephrectomy many years ago, CKD, Obese male who lives at home with wife and son, ambulates at baseline, comes in to the ED, due to gradual onset, worsening, intermittent shortness of breath, worse with exertion, associated with pressure like, mid, upper sternal chest discomfort, over the last 4 days. Patient reports feeling tired, and shortness of breath on waking up, that is chronic however, the last 4 days have been different. As patient was going down the stairs today, felt palpitations and chest pain, that prompted him to call his Pulm, who prompted him to call 911.     INTERVAL HPI/OVERNIGHT EVENTS:     MEDICATIONS  (STANDING):  aMIOdarone    Tablet 400 milliGRAM(s) Oral two times a day  atorvastatin 10 milliGRAM(s) Oral at bedtime  cefTRIAXone   IVPB      cefTRIAXone   IVPB 1000 milliGRAM(s) IV Intermittent every 24 hours  hydrALAZINE 25 milliGRAM(s) Oral three times a day  insulin lispro (ADMELOG) corrective regimen sliding scale   SubCutaneous three times a day before meals  insulin lispro (ADMELOG) corrective regimen sliding scale   SubCutaneous at bedtime  metoprolol tartrate 12.5 milliGRAM(s) Oral two times a day  montelukast 10 milliGRAM(s) Oral daily  pantoprazole    Tablet 40 milliGRAM(s) Oral before breakfast  rivaroxaban 15 milliGRAM(s) Oral with dinner  sodium chloride 0.9%. 1000 milliLiter(s) (60 mL/Hr) IV Continuous <Continuous>    MEDICATIONS  (PRN):      REVIEW OF SYSTEMS:  CONSTITUTIONAL: No fever, weight loss, or fatigue  RESPIRATORY: No cough, wheezing, chills or hemoptysis; No shortness of breath  CARDIOVASCULAR: No chest pain, palpitations, dizziness, or leg swelling  GASTROINTESTINAL: No abdominal pain. No nausea, vomiting, or hematemesis; No diarrhea or constipation. No melena or hematochezia.  GENITOURINARY: No dysuria or hematuria, urinary frequency  NEUROLOGICAL: No headaches, memory loss, loss of strength, numbness, or tremors  SKIN: No itching, burning, rashes, or lesions     Vital Signs Last 24 Hrs  T(C): 36.6 (22 Jul 2022 11:03), Max: 36.8 (22 Jul 2022 07:24)  T(F): 97.9 (22 Jul 2022 11:03), Max: 98.2 (22 Jul 2022 07:24)  HR: 69 (22 Jul 2022 11:03) (60 - 80)  BP: 132/63 (22 Jul 2022 11:03) (132/63 - 162/64)  BP(mean): --  RR: 18 (22 Jul 2022 11:03) (18 - 19)  SpO2: 95% (22 Jul 2022 11:03) (94% - 97%)    Parameters below as of 22 Jul 2022 11:03  Patient On (Oxygen Delivery Method): room air        PHYSICAL EXAMINATION:  GENERAL: NAD, well built  HEAD:  Atraumatic, Normocephalic  EYES:  conjunctiva and sclera clear  NECK: Supple, No JVD, Normal thyroid  CHEST/LUNG: Clear to auscultation. Clear to percussion bilaterally; No rales, rhonchi, wheezing, or rubs  HEART: Regular rate and rhythm; No murmurs, rubs, or gallops  ABDOMEN: Soft, Nontender, Nondistended; Bowel sounds present  NERVOUS SYSTEM:  Alert & Oriented X3,    EXTREMITIES:  2+ Peripheral Pulses, No clubbing, cyanosis, or edema  SKIN: warm dry                          16.0   13.07 )-----------( 213      ( 22 Jul 2022 06:44 )             48.0     07-22    141  |  108  |  23<H>  ----------------------------<  154<H>  5.1   |  28  |  1.81<H>    Ca    9.2      22 Jul 2022 06:44  Phos  2.5     07-22  Mg     2.5     07-22    TPro  7.3  /  Alb  3.1<L>  /  TBili  0.7  /  DBili  x   /  AST  29  /  ALT  37  /  AlkPhos  61  07-22    LIVER FUNCTIONS - ( 22 Jul 2022 06:44 )  Alb: 3.1 g/dL / Pro: 7.3 g/dL / ALK PHOS: 61 U/L / ALT: 37 U/L DA / AST: 29 U/L / GGT: x                   CAPILLARY BLOOD GLUCOSE      RADIOLOGY & ADDITIONAL TESTS:                   PGY-1 Progress Note discussed with attending    PAGER #: [--------] TILL 5:00 PM  PLEASE CONTACT ON CALL TEAM:  - On Call Team (Please refer to Sri) FROM 5:00 PM - 8:30PM  - Nightfloat Team FROM 8:30 -7:30 AM    CHIEF COMPLAINT & BRIEF HOSPITAL COURSE:  Patient is an 87 yo PMH of COPD and KENRICK on CPAP, diabetes. A fib on Xarelto, RCC s/p right nephrectomy many years ago, CKD, Obese male who lives at home with wife and son, ambulates at baseline, comes in to the ED, due to gradual onset, worsening, intermittent shortness of breath, worse with exertion, associated with pressure like, mid, upper sternal chest discomfort, over the last 4 days. Patient reports feeling tired, and shortness of breath on waking up, that is chronic however, the last 4 days have been different. As patient was going down the stairs today, felt palpitations and chest pain, that prompted him to call his Pulm, who prompted him to call 911.     INTERVAL HPI/OVERNIGHT EVENTS:   Patient seen and examined at bedside. NO acute events overnight. Patient has no complaints.     MEDICATIONS  (STANDING):  aMIOdarone    Tablet 400 milliGRAM(s) Oral two times a day  atorvastatin 10 milliGRAM(s) Oral at bedtime  cefTRIAXone   IVPB      cefTRIAXone   IVPB 1000 milliGRAM(s) IV Intermittent every 24 hours  hydrALAZINE 25 milliGRAM(s) Oral three times a day  insulin lispro (ADMELOG) corrective regimen sliding scale   SubCutaneous three times a day before meals  insulin lispro (ADMELOG) corrective regimen sliding scale   SubCutaneous at bedtime  metoprolol tartrate 12.5 milliGRAM(s) Oral two times a day  montelukast 10 milliGRAM(s) Oral daily  pantoprazole    Tablet 40 milliGRAM(s) Oral before breakfast  rivaroxaban 15 milliGRAM(s) Oral with dinner  sodium chloride 0.9%. 1000 milliLiter(s) (60 mL/Hr) IV Continuous <Continuous>    MEDICATIONS  (PRN):      REVIEW OF SYSTEMS:  CONSTITUTIONAL: No fever, weight loss, or fatigue  RESPIRATORY: No cough, wheezing, chills or hemoptysis; No shortness of breath  CARDIOVASCULAR: No chest pain, palpitations, dizziness, or leg swelling  GASTROINTESTINAL: No abdominal pain. No nausea, vomiting, or hematemesis; No diarrhea or constipation. No melena or hematochezia.  GENITOURINARY: No dysuria or hematuria, urinary frequency  NEUROLOGICAL: No headaches, memory loss, loss of strength, numbness, or tremors  SKIN: No itching, burning, rashes, or lesions     Vital Signs Last 24 Hrs  T(C): 36.6 (22 Jul 2022 11:03), Max: 36.8 (22 Jul 2022 07:24)  T(F): 97.9 (22 Jul 2022 11:03), Max: 98.2 (22 Jul 2022 07:24)  HR: 69 (22 Jul 2022 11:03) (60 - 80)  BP: 132/63 (22 Jul 2022 11:03) (132/63 - 162/64)  BP(mean): --  RR: 18 (22 Jul 2022 11:03) (18 - 19)  SpO2: 95% (22 Jul 2022 11:03) (94% - 97%)    Parameters below as of 22 Jul 2022 11:03  Patient On (Oxygen Delivery Method): room air        PHYSICAL EXAMINATION:  GENERAL: NAD, overweight  HEAD:  Atraumatic, Normocephalic  EYES:  conjunctiva and sclera clear  NECK: Supple, No JVD, Normal thyroid  CHEST/LUNG: Clear to auscultation. Clear to percussion bilaterally; No rales, rhonchi, wheezing, or rubs  HEART: Regular rate and rhythm; No murmurs, rubs, or gallops  ABDOMEN: Soft, Nontender, Nondistended; Bowel sounds present  NERVOUS SYSTEM:  Alert & Oriented X3,    EXTREMITIES:  2+ Peripheral Pulses, No clubbing, cyanosis, or edema  SKIN: warm dry                          16.0   13.07 )-----------( 213      ( 22 Jul 2022 06:44 )             48.0     07-22    141  |  108  |  23<H>  ----------------------------<  154<H>  5.1   |  28  |  1.81<H>    Ca    9.2      22 Jul 2022 06:44  Phos  2.5     07-22  Mg     2.5     07-22    TPro  7.3  /  Alb  3.1<L>  /  TBili  0.7  /  DBili  x   /  AST  29  /  ALT  37  /  AlkPhos  61  07-22    LIVER FUNCTIONS - ( 22 Jul 2022 06:44 )  Alb: 3.1 g/dL / Pro: 7.3 g/dL / ALK PHOS: 61 U/L / ALT: 37 U/L DA / AST: 29 U/L / GGT: x                   CAPILLARY BLOOD GLUCOSE      RADIOLOGY & ADDITIONAL TESTS:

## 2022-07-22 NOTE — PROGRESS NOTE ADULT - PROBLEM SELECTOR PLAN 6
Patient is on hydralazine at home  s/p nitro, BP 140s in the ED  dc home meds cw statin  goal LDL<70

## 2022-07-22 NOTE — PROGRESS NOTE ADULT - SUBJECTIVE AND OBJECTIVE BOX
Patient is a 87y old  Male who presents with a chief complaint of Chest pain (21 Jul 2022 15:07)    pt seen in tele [ x ], reg med floor [   ], bed [ x ], chair at bedside [   ], a+o x3 [ x ], lethargic [  ],    nad [ x ]        Allergies    No Known Allergies        Vitals    T(F): 97.4 (07-22-22 @ 04:37), Max: 98.4 (07-21-22 @ 11:35)  HR: 80 (07-22-22 @ 04:37) (60 - 80)  BP: 151/70 (07-22-22 @ 04:37) (138/72 - 158/64)  RR: 18 (07-22-22 @ 04:37) (18 - 19)  SpO2: 94% (07-22-22 @ 04:37) (94% - 98%)  Wt(kg): --  CAPILLARY BLOOD GLUCOSE      POCT Blood Glucose.: 127 mg/dL (21 Jul 2022 21:08)      Labs                          17.0   10.35 )-----------( 215      ( 21 Jul 2022 06:06 )             51.1       07-21    141  |  106  |  29<H>  ----------------------------<  141<H>  5.1   |  27  |  2.04<H>    Ca    9.4      21 Jul 2022 06:06  Phos  3.1     07-21  Mg     2.6     07-21    TPro  7.0  /  Alb  3.0<L>  /  TBili  0.7  /  DBili  x   /  AST  27  /  ALT  33  /  AlkPhos  58  07-20                Radiology Results      Meds    MEDICATIONS  (STANDING):  aMIOdarone    Tablet 400 milliGRAM(s) Oral two times a day  atorvastatin 10 milliGRAM(s) Oral at bedtime  cefTRIAXone   IVPB      cefTRIAXone   IVPB 1000 milliGRAM(s) IV Intermittent every 24 hours  insulin lispro (ADMELOG) corrective regimen sliding scale   SubCutaneous three times a day before meals  insulin lispro (ADMELOG) corrective regimen sliding scale   SubCutaneous at bedtime  metoprolol tartrate 12.5 milliGRAM(s) Oral two times a day  montelukast 10 milliGRAM(s) Oral daily  pantoprazole    Tablet 40 milliGRAM(s) Oral before breakfast  rivaroxaban 15 milliGRAM(s) Oral with dinner  sodium chloride 0.9%. 1000 milliLiter(s) (60 mL/Hr) IV Continuous <Continuous>      MEDICATIONS  (PRN):      Physical Exam    Neuro :  no focal deficits  Respiratory: CTA B/L  CV: RRR, S1S2, no murmurs,   Abdominal: Soft, NT, ND +BS,  Extremities: No edema, + peripheral pulses    ASSESSMENT    Neuro :  no focal deficits  Respiratory: CTA B/L  CV: RRR, S1S2, no murmurs,   Abdominal: Soft, NT, ND +BS,  Extremities: No edema, + peripheral pulses    ASSESSMENT    chest pain,    r/o acs,   sob,   r/o chf,   uti   amy  h/o COPD   KENRICK on CPAP,   diabetes.   A fib on Xarelto,   RCC s/p right nephrectomy many years ago,   CKD      PLAN    cont tele,   acs protocol,   aspirin, statin,   trop x 3 noted above  cardio f/u   echo with Normal left ventricular systolic function, pulm htn noted above  amiodarone 400mg bid loading day 3,  cont  lopressor 12.5mg q12h 2nd to pamela cardia  r/o tachy-pamela synd.   stress test in am   ua positive  rocephin added  f/u ucx   monitor serum creat   pulm f/u   Sleep study  PFTs as OP  oxygen supp  CPAP at night  Bronchodilators  Symbicort /4.5 mcgs 2 puffs po BID.hgba1c 6.4 noted above  lispro ss,   cont current meds           Patient is a 87y old  Male who presents with a chief complaint of Chest pain (21 Jul 2022 15:07)    pt seen in tele [ x ], reg med floor [   ], bed [ x ], chair at bedside [   ], a+o x3 [ x ], lethargic [  ],    nad [ x ]        Allergies    No Known Allergies        Vitals    T(F): 97.4 (07-22-22 @ 04:37), Max: 98.4 (07-21-22 @ 11:35)  HR: 80 (07-22-22 @ 04:37) (60 - 80)  BP: 151/70 (07-22-22 @ 04:37) (138/72 - 158/64)  RR: 18 (07-22-22 @ 04:37) (18 - 19)  SpO2: 94% (07-22-22 @ 04:37) (94% - 98%)  Wt(kg): --  CAPILLARY BLOOD GLUCOSE      POCT Blood Glucose.: 127 mg/dL (21 Jul 2022 21:08)      Labs                          17.0   10.35 )-----------( 215      ( 21 Jul 2022 06:06 )             51.1       07-21    141  |  106  |  29<H>  ----------------------------<  141<H>  5.1   |  27  |  2.04<H>    Ca    9.4      21 Jul 2022 06:06  Phos  3.1     07-21  Mg     2.6     07-21    TPro  7.0  /  Alb  3.0<L>  /  TBili  0.7  /  DBili  x   /  AST  27  /  ALT  33  /  AlkPhos  58  07-20                Radiology Results      Meds    MEDICATIONS  (STANDING):  aMIOdarone    Tablet 400 milliGRAM(s) Oral two times a day  atorvastatin 10 milliGRAM(s) Oral at bedtime  cefTRIAXone   IVPB      cefTRIAXone   IVPB 1000 milliGRAM(s) IV Intermittent every 24 hours  insulin lispro (ADMELOG) corrective regimen sliding scale   SubCutaneous three times a day before meals  insulin lispro (ADMELOG) corrective regimen sliding scale   SubCutaneous at bedtime  metoprolol tartrate 12.5 milliGRAM(s) Oral two times a day  montelukast 10 milliGRAM(s) Oral daily  pantoprazole    Tablet 40 milliGRAM(s) Oral before breakfast  rivaroxaban 15 milliGRAM(s) Oral with dinner  sodium chloride 0.9%. 1000 milliLiter(s) (60 mL/Hr) IV Continuous <Continuous>      MEDICATIONS  (PRN):      Physical Exam    Neuro :  no focal deficits  Respiratory: CTA B/L  CV: RRR, S1S2, no murmurs,   Abdominal: Soft, NT, ND +BS,  Extremities: No edema, + peripheral pulses    ASSESSMENT    Neuro :  no focal deficits  Respiratory: CTA B/L  CV: RRR, S1S2, no murmurs,   Abdominal: Soft, NT, ND +BS,  Extremities: No edema, + peripheral pulses    ASSESSMENT    chest pain,    r/o acs,   sob,   r/o chf,   uti   amy  h/o COPD   KENRICK on CPAP,   diabetes.   A fib on Xarelto,   RCC s/p right nephrectomy many years ago,   CKD      PLAN    cont tele,   acs protocol,   aspirin, statin,   trop x 3 noted above  cardio f/u   echo with Normal left ventricular systolic function, pulm htn noted above  amiodarone 400mg bid loading day 4,  cont  lopressor 12.5mg q12h 2nd to pamela cardia  r/o tachy-pamela synd.   f/u stress test in   ua positive  cont rocephin  to complete 5 days  f/u ucx   monitor serum creat   pulm f/u   Sleep study  PFTs as OP  oxygen supp  CPAP at night  Bronchodilators  Symbicort /4.5 mcgs 2 puffs po BID.hgba1c 6.4 noted above  lispro ss,   cont current meds

## 2022-07-23 LAB
ALBUMIN SERPL ELPH-MCNC: 3.1 G/DL — LOW (ref 3.5–5)
ALP SERPL-CCNC: 59 U/L — SIGNIFICANT CHANGE UP (ref 40–120)
ALT FLD-CCNC: 36 U/L DA — SIGNIFICANT CHANGE UP (ref 10–60)
ANION GAP SERPL CALC-SCNC: 6 MMOL/L — SIGNIFICANT CHANGE UP (ref 5–17)
AST SERPL-CCNC: 21 U/L — SIGNIFICANT CHANGE UP (ref 10–40)
BILIRUB SERPL-MCNC: 0.8 MG/DL — SIGNIFICANT CHANGE UP (ref 0.2–1.2)
BUN SERPL-MCNC: 19 MG/DL — HIGH (ref 7–18)
CALCIUM SERPL-MCNC: 9.2 MG/DL — SIGNIFICANT CHANGE UP (ref 8.4–10.5)
CHLORIDE SERPL-SCNC: 107 MMOL/L — SIGNIFICANT CHANGE UP (ref 96–108)
CO2 SERPL-SCNC: 28 MMOL/L — SIGNIFICANT CHANGE UP (ref 22–31)
CREAT SERPL-MCNC: 1.69 MG/DL — HIGH (ref 0.5–1.3)
EGFR: 39 ML/MIN/1.73M2 — LOW
GLUCOSE BLDC GLUCOMTR-MCNC: 130 MG/DL — HIGH (ref 70–99)
GLUCOSE BLDC GLUCOMTR-MCNC: 133 MG/DL — HIGH (ref 70–99)
GLUCOSE BLDC GLUCOMTR-MCNC: 136 MG/DL — HIGH (ref 70–99)
GLUCOSE BLDC GLUCOMTR-MCNC: 137 MG/DL — HIGH (ref 70–99)
GLUCOSE SERPL-MCNC: 139 MG/DL — HIGH (ref 70–99)
HCT VFR BLD CALC: 49.2 % — SIGNIFICANT CHANGE UP (ref 39–50)
HGB BLD-MCNC: 16.4 G/DL — SIGNIFICANT CHANGE UP (ref 13–17)
MAGNESIUM SERPL-MCNC: 2.5 MG/DL — SIGNIFICANT CHANGE UP (ref 1.6–2.6)
MCHC RBC-ENTMCNC: 32.6 PG — SIGNIFICANT CHANGE UP (ref 27–34)
MCHC RBC-ENTMCNC: 33.3 GM/DL — SIGNIFICANT CHANGE UP (ref 32–36)
MCV RBC AUTO: 97.8 FL — SIGNIFICANT CHANGE UP (ref 80–100)
NRBC # BLD: 0 /100 WBCS — SIGNIFICANT CHANGE UP (ref 0–0)
PHOSPHATE SERPL-MCNC: 2.2 MG/DL — LOW (ref 2.5–4.5)
PLATELET # BLD AUTO: 199 K/UL — SIGNIFICANT CHANGE UP (ref 150–400)
POTASSIUM SERPL-MCNC: 4.6 MMOL/L — SIGNIFICANT CHANGE UP (ref 3.5–5.3)
POTASSIUM SERPL-SCNC: 4.6 MMOL/L — SIGNIFICANT CHANGE UP (ref 3.5–5.3)
PROT SERPL-MCNC: 7.2 G/DL — SIGNIFICANT CHANGE UP (ref 6–8.3)
RBC # BLD: 5.03 M/UL — SIGNIFICANT CHANGE UP (ref 4.2–5.8)
RBC # FLD: 13.3 % — SIGNIFICANT CHANGE UP (ref 10.3–14.5)
SODIUM SERPL-SCNC: 141 MMOL/L — SIGNIFICANT CHANGE UP (ref 135–145)
WBC # BLD: 9.84 K/UL — SIGNIFICANT CHANGE UP (ref 3.8–10.5)
WBC # FLD AUTO: 9.84 K/UL — SIGNIFICANT CHANGE UP (ref 3.8–10.5)

## 2022-07-23 RX ORDER — AMIODARONE HYDROCHLORIDE 400 MG/1
200 TABLET ORAL DAILY
Refills: 0 | Status: DISCONTINUED | OUTPATIENT
Start: 2022-07-23 | End: 2022-07-25

## 2022-07-23 RX ORDER — ISOSORBIDE MONONITRATE 60 MG/1
30 TABLET, EXTENDED RELEASE ORAL DAILY
Refills: 0 | Status: DISCONTINUED | OUTPATIENT
Start: 2022-07-23 | End: 2022-07-25

## 2022-07-23 RX ORDER — ACETYLCYSTEINE 200 MG/ML
1200 VIAL (ML) MISCELLANEOUS EVERY 12 HOURS
Refills: 0 | Status: DISCONTINUED | OUTPATIENT
Start: 2022-07-24 | End: 2022-07-25

## 2022-07-23 RX ADMIN — PANTOPRAZOLE SODIUM 40 MILLIGRAM(S): 20 TABLET, DELAYED RELEASE ORAL at 05:42

## 2022-07-23 RX ADMIN — ISOSORBIDE MONONITRATE 30 MILLIGRAM(S): 60 TABLET, EXTENDED RELEASE ORAL at 11:22

## 2022-07-23 RX ADMIN — CEFTRIAXONE 100 MILLIGRAM(S): 500 INJECTION, POWDER, FOR SOLUTION INTRAMUSCULAR; INTRAVENOUS at 10:04

## 2022-07-23 RX ADMIN — ATORVASTATIN CALCIUM 10 MILLIGRAM(S): 80 TABLET, FILM COATED ORAL at 21:54

## 2022-07-23 RX ADMIN — Medication 25 MILLIGRAM(S): at 05:43

## 2022-07-23 RX ADMIN — MONTELUKAST 10 MILLIGRAM(S): 4 TABLET, CHEWABLE ORAL at 11:22

## 2022-07-23 RX ADMIN — Medication 25 MILLIGRAM(S): at 14:49

## 2022-07-23 RX ADMIN — Medication 12.5 MILLIGRAM(S): at 05:43

## 2022-07-23 RX ADMIN — AMIODARONE HYDROCHLORIDE 400 MILLIGRAM(S): 400 TABLET ORAL at 05:43

## 2022-07-23 RX ADMIN — Medication 12.5 MILLIGRAM(S): at 17:32

## 2022-07-23 RX ADMIN — RIVAROXABAN 15 MILLIGRAM(S): KIT at 17:32

## 2022-07-23 RX ADMIN — Medication 25 MILLIGRAM(S): at 21:54

## 2022-07-23 NOTE — CONSULT NOTE ADULT - SUBJECTIVE AND OBJECTIVE BOX
CHIEF COMPLAINT:Patient is a 87y old  Male who presents with a chief complaint of Chest pain .      HPI:  Patient is an 85 yo PMH of COPD and KENRICK on CPAP, diabetes,Parox  A fib on Xarelto, RCC s/p right nephrectomy many years ago, CKD, Obese male who lives at home with wife and son, ambulates at baseline, comes in to the ED, due to gradual onset, worsening, intermittent shortness of breath, worse with exertion, associated with pressure like, mid, upper sternal chest discomfort, over the last 4 days. Patient reports feeling tired, and shortness of breath on waking up, that is chronic however, the last 4 days have been different. As patient was going down the stairs today, felt palpitations and chest pain, that prompted him to call his Pulm, who prompted him to call 911. In the EMS route, received aspirin and nitroglycerin which provided improvement in symptoms in the ED.    Of note: patient follows with Dr. Brynn Rodriguez outpatient: Records shows last Echo in 2020 with EF of 58%.    Reported: pressure headaches with the shortness of breath, as well as chronic numbness and tingling in the feet, however, denied weakness, fever, chills, nausea or vomiting or changes in bowel or bladder habits    In the ED /76  with EKG showing A -fib RVR  Exam: Irregular heart sounds, no acute distress, 1 + pitting edema  Labs: WBC 11, Cr. 1.7 BNP 1200s, trop 15  CXR: wet read: vascular congestion    Hence patient was admitted for A-fib RVR, and further ischemia eval (18 Jul 2022 13:27)      PAST MEDICAL & SURGICAL HISTORY:  Diabetes      Kidney tumor      KENRICK and COPD overlap syndrome      H/O: HTN (hypertension)    CRI    s/p Nehrectomy          MEDICATIONS  (STANDING):  aMIOdarone    Tablet   Oral   atorvastatin 10 milliGRAM(s) Oral at bedtime  digoxin  Injectable 500 MICROGram(s) IV Push once  insulin lispro (ADMELOG) corrective regimen sliding scale   SubCutaneous three times a day before meals  insulin lispro (ADMELOG) corrective regimen sliding scale   SubCutaneous at bedtime  metoprolol tartrate 25 milliGRAM(s) Oral every 8 hours  montelukast 10 milliGRAM(s) Oral daily  rivaroxaban 15 milliGRAM(s) Oral with dinner    MEDICATIONS  (PRN):      FAMILY HISTORY:No hx of CAD      SOCIAL HISTORY:    [ x] Non-smoker    [ x] Alcohol-denies    Allergies    No Known Allergies    Intolerances    	    REVIEW OF SYSTEMS:  CONSTITUTIONAL: No fever, weight loss, or fatigue  EYES: No eye pain, visual disturbances, or discharge  ENT:  No difficulty hearing, tinnitus, vertigo; No sinus or throat pain  NECK: No pain or stiffness  RESPIRATORY: No cough, wheezing, chills or hemoptysis; + Shortness of Breath  CARDIOVASCULAR: + chest pain, + palpitations, NO passing out, dizziness, or leg swelling  GASTROINTESTINAL: No abdominal or epigastric pain. No nausea, vomiting, or hematemesis; No diarrhea or constipation. No melena or hematochezia.  GENITOURINARY: No dysuria, frequency, hematuria, or incontinence  NEUROLOGICAL: No headaches, memory loss, loss of strength, numbness, or tremors  SKIN: No itching, burning, rashes, or lesions   LYMPH Nodes: No enlarged glands  ENDOCRINE: No heat or cold intolerance; No hair loss  MUSCULOSKELETAL: No joint pain or swelling; No muscle, back, or extremity pain  PSYCHIATRIC: No depression, anxiety, mood swings, or difficulty sleeping  HEME/LYMPH: No easy bruising, or bleeding gums  ALLERGY AND IMMUNOLOGIC: No hives or eczema	        PHYSICAL EXAM:  T(C): 36.8 (07-19-22 @ 07:32), Max: 37.1 (07-18-22 @ 09:04)  HR: 91 (07-19-22 @ 07:32) (80 - 128)  BP: 139/84 (07-19-22 @ 08:24) (115/63 - 174/65)  RR: 19 (07-19-22 @ 07:32) (17 - 22)  SpO2: 97% (07-19-22 @ 07:32) (93% - 100%)  Wt(kg): --  I&O's Summary      Appearance: Normal	  HEENT:   Normal oral mucosa, PERRL, EOMI	  Lymphatic: No lymphadenopathy  Cardiovascular: Normal S1 S2, No JVD, No murmurs, No edema  Respiratory: Lungs clear to auscultation	  Psychiatry: A & O x 3, Mood & affect appropriate  Gastrointestinal:  Soft, Non-tender, + BS	  Skin: No rashes, No ecchymoses, No cyanosis	  Neurologic: Non-focal  Extremities: Normal range of motion, No clubbing, cyanosis or edema  Vascular: Peripheral pulses palpable 2+ bilaterally    	    ECG:  	afib with RVR  	  	  LABS:	 	  Troponin I, High Sensitivity Result: 284.7 ng/L (07-18-22 @ 21:16)  Troponin I, High Sensitivity Result: 331.6 ng/L (07-18-22 @ 18:26)  Troponin I, High Sensitivity Result: 15.3 ng/L (07-18-22 @ 10:04)                          16.7   10.45 )-----------( 206      ( 19 Jul 2022 06:58 )             49.4     07-19    140  |  104  |  20<H>  ----------------------------<  140<H>  4.9   |  26  |  1.66<H>    Ca    9.4      19 Jul 2022 06:58  Phos  2.7     07-19  Mg     2.6     07-19    TPro  7.7  /  Alb  3.3<L>  /  TBili  0.9  /  DBili  x   /  AST  25  /  ALT  35  /  AlkPhos  64  07-19    proBNP: Serum Pro-Brain Natriuretic Peptide: 1225 pg/mL (07-18 @ 10:04)    Lipid Profile: Cholesterol 156  LDL --  HDL 43    ldl calc 82  Ratio --      TSH: Thyroid Stimulating Hormone, Serum: 1.99 uU/mL (07-19 @ 06:58)      Echo 2020-nl fx,mild mr,mod aI.    Stress test-no ischemia 2020.
  Patient is a 87y Male whom presented to the hospital with chest pain  Patient is an 85 yo PMH of COPD and KENRICK on CPAP, diabetes. A fib on Xarelto, RCC s/p right nephrectomy many years ago, CKD, Obese male who lives at home with wife and son, ambulates at baseline, comes in to the ED, due to gradual onset, worsening, intermittent shortness of breath, worse with exertion, associated with pressure like, mid, upper sternal chest discomfort, over the last 4 days. Patient reports feeling tired, and shortness of breath on waking up, that is chronic however, the last 4 days have been different. As patient was going down the stairs today, felt palpitations and chest pain, that prompted him to call his Pulm, who prompted him to call 911    PAST MEDICAL & SURGICAL HISTORY:  Diabetes      Kidney tumor      KENRICK and COPD overlap syndrome      H/O: HTN (hypertension)        No Known Allergies    Home Medications Reviewed  Hospital Medications:   MEDICATIONS  (STANDING):  aMIOdarone    Tablet 200 milliGRAM(s) Oral daily  atorvastatin 10 milliGRAM(s) Oral at bedtime  cefTRIAXone   IVPB      cefTRIAXone   IVPB 1000 milliGRAM(s) IV Intermittent every 24 hours  hydrALAZINE 25 milliGRAM(s) Oral three times a day  insulin lispro (ADMELOG) corrective regimen sliding scale   SubCutaneous three times a day before meals  insulin lispro (ADMELOG) corrective regimen sliding scale   SubCutaneous at bedtime  isosorbide   mononitrate ER Tablet (IMDUR) 30 milliGRAM(s) Oral daily  metoprolol tartrate 12.5 milliGRAM(s) Oral two times a day  montelukast 10 milliGRAM(s) Oral daily  pantoprazole    Tablet 40 milliGRAM(s) Oral before breakfast  rivaroxaban 15 milliGRAM(s) Oral with dinner  sodium chloride 0.9%. 1000 milliLiter(s) (60 mL/Hr) IV Continuous <Continuous>    SOCIAL HISTORY:  Denies ETOh,Smoking,   FAMILY HISTORY:    REVIEW OF SYSTEMS  HAS NO   FEVER  CHILLS   GETS  SOB ON MILD  EXERTION  AND  WALKING UP  A FLIGHT OF STAIRS   NO CH  PAIN  / PALPITATIONS NOW   APPETITE IS  GOOD, NO  N/V  OR  ABD  PAIN  VOIDING  WELL   NO LEG  SWELLING      VITALS:  T(F): 98.4 (22 @ 11:15), Max: 98.4 (22 @ 11:15)  HR: 79 (22 @ 11:15)  BP: 160/84 (22 @ 11:15)  RR: 18 (22 @ 11:15)  SpO2: 96% (22 @ 11:15)  Wt(kg): --     @ 07:01  -   @ 13:54  --------------------------------------------------------  IN: 430 mL / OUT: 0 mL / NET: 430 mL        PHYSICAL EXAM:  Constitutional: NAD  HEENT: CONJ PINK  Neck: No JVD  Respiratory: CTAB, NO  CRACKLES  Cardiovascular: S1, S2, IRREG  Gastrointestinal: BS+, soft, NT/ND  Extremities:  No peripheral edema  Neurological: A/O x 3,   :. No carlin.         LABS:      141  |  107  |  19<H>  ----------------------------<  139<H>  4.6   |  28  |  1.69<H>    Ca    9.2      2022 05:33  Phos  2.2       Mg     2.5         TPro  7.2  /  Alb  3.1<L>  /  TBili  0.8  /  DBili      /  AST  21  /  ALT  36  /  AlkPhos  59      Creatinine Trend: 1.69 <--, 1.81 <--, 2.04 <--, 1.92 <--, 1.66 <--, 1.72 <--                        16.4   9.84  )-----------( 199      ( 2022 05:33 )             49.2     Urine Studies:  Urinalysis Basic - ( 2022 21:40 )    Color: Yellow / Appearance: Clear / S.020 / pH:   Gluc:  / Ketone: Negative  / Bili: Negative / Urobili: Negative   Blood:  / Protein: 100 / Nitrite: Negative   Leuk Esterase: Moderate / RBC: 0-2 /HPF / WBC 6-10 /HPF   Sq Epi:  / Non Sq Epi: Few /HPF / Bacteria: Few /HPF      Osmolality, Random Urine: 630 mos/kg ( @ 21:40)  Sodium, Random Urine: 19 mmol/L ( @ 21:40)  Creatinine, Random Urine: 174 mg/dL ( @ 21:40)    RADIOLOGY & ADDITIONAL STUDIES:                
PULMONARY CONSULT NOTE          VIRGEN AZEVEDO  MRN-[id number]      HISTORY OF PRESENT ILLNESS:    Reason for Admission: Chest pain  History of Present Illness:   Patient is an 87 yo PMH of COPD and KENRICK on CPAP, diabetes. A fib on Xarelto, RCC s/p right nephrectomy many years ago, CKD, Obese male who lives at home with wife and son, ambulates at baseline, comes in to the ED, due to gradual onset, worsening, intermittent shortness of breath, worse with exertion, associated with pressure like, mid, upper sternal chest discomfort, over the last 4 days. Patient reports feeling tired, and shortness of breath on waking up, that is chronic however, the last 4 days have been different. As patient was going down the stairs today, felt palpitations and chest pain, that prompted him to call his Pulm, who prompted him to call 911. In the EMS route, received aspirin and nitroglycerin which provided improvement in symptoms in the ED.    History of Present Illness: As above, He is alert, awake and comfortably laying in bed in no acute distress. Currently he is on RA.    MEDICATIONS  (STANDING):  aMIOdarone    Tablet   Oral   aMIOdarone    Tablet 400 milliGRAM(s) Oral every 8 hours  atorvastatin 10 milliGRAM(s) Oral at bedtime  insulin lispro (ADMELOG) corrective regimen sliding scale   SubCutaneous three times a day before meals  insulin lispro (ADMELOG) corrective regimen sliding scale   SubCutaneous at bedtime  metoprolol tartrate 25 milliGRAM(s) Oral every 8 hours  montelukast 10 milliGRAM(s) Oral daily  pantoprazole    Tablet 40 milliGRAM(s) Oral before breakfast  rivaroxaban 15 milliGRAM(s) Oral with dinner      MEDICATIONS  (PRN):      Allergies    No Known Allergies    Intolerances        PAST MEDICAL & SURGICAL HISTORY:  Diabetes      Kidney tumor      KENRICK and COPD overlap syndrome      H/O: HTN (hypertension)          FAMILY HISTORY:      SOCIAL HISTORY  Smoking History:     REVIEW OF SYSTEMS:    CONSTITUTIONAL:  No fevers, chills, sweats    HEENT:  Eyes:  No diplopia or blurred vision. ENT:  No earache, sore throat or runny nose.    CARDIOVASCULAR:  No pressure, squeezing, tightness, or heaviness about the chest; no palpitations.    RESPIRATORY:  Per HPI    GASTROINTESTINAL:  No abdominal pain, nausea, vomiting or diarrhea.    GENITOURINARY:  No dysuria, frequency or urgency.    NEUROLOGIC:  No paresthesias, fasciculations, seizures or weakness.    PSYCHIATRIC:  No disorder of thought or mood.    Vital Signs Last 24 Hrs  T(C): 36.8 (19 Jul 2022 07:32), Max: 36.8 (19 Jul 2022 07:32)  T(F): 98.3 (19 Jul 2022 07:32), Max: 98.3 (19 Jul 2022 07:32)  HR: 99 (19 Jul 2022 08:31) (80 - 105)  BP: 139/84 (19 Jul 2022 08:24) (115/63 - 174/65)  BP(mean): --  RR: 19 (19 Jul 2022 07:32) (18 - 22)  SpO2: 99% (19 Jul 2022 08:31) (93% - 100%)    Parameters below as of 19 Jul 2022 07:32  Patient On (Oxygen Delivery Method): room air      I&O's Detail      PHYSICAL EXAMINATION:    GENERAL: The patient is a well-developed, well-nourished _____in no apparent distress.     HEENT: Head is normocephalic and atraumatic. Extraocular muscles are intact. Mucous membranes are moist.     NECK: Supple.     LUNGS: Clear to auscultation without wheezing, rales, or rhonchi. Respirations unlabored    HEART: Regular rate and rhythm without murmur.    ABDOMEN: Soft, nontender, and nondistended.  No hepatosplenomegaly is noted.    EXTREMITIES: B/L pedal edema.    NEUROLOGIC: Grossly intact.      LABS:                        16.7   10.45 )-----------( 206      ( 19 Jul 2022 06:58 )             49.4     07-19    140  |  104  |  20<H>  ----------------------------<  140<H>  4.9   |  26  |  1.66<H>    Ca    9.4      19 Jul 2022 06:58  Phos  2.7     07-19  Mg     2.6     07-19    TPro  7.7  /  Alb  3.3<L>  /  TBili  0.9  /  DBili  x   /  AST  25  /  ALT  35  /  AlkPhos  64  07-19                Serum Pro-Brain Natriuretic Peptide: 1225 pg/mL (07-18-22 @ 10:04)          MICROBIOLOGY:    RADIOLOGY & ADDITIONAL STUDIES:      CXR:    Ct scan chest;    ekg;    echo:

## 2022-07-23 NOTE — PROGRESS NOTE ADULT - PROBLEM SELECTOR PLAN 7
Patient is on hydralazine at home  s/p nitro, BP 140s in the ED  BP 150s on 7/22  start hydralazine 25mg TID

## 2022-07-23 NOTE — PROGRESS NOTE ADULT - SUBJECTIVE AND OBJECTIVE BOX
PGY-1 Progress Note discussed with attending    PAGER #: [[phone]] TILL 5:00 PM  PLEASE CONTACT ON CALL TEAM:  - On Call Team (Please refer to Sri) FROM 5:00 PM - 8:30PM  - Nightfloat Team FROM 8:30 -7:30 AM    CHIEF COMPLAINT & BRIEF HOSPITAL COURSE:    Patient is an 87 yo PMH of COPD and KENRICK on CPAP, diabetes. A fib on Xarelto, RCC s/p right nephrectomy many years ago, CKD, Obese male who lives at home with wife and son, ambulates at baseline, comes in to the ED, due to gradual onset, worsening, intermittent shortness of breath, worse with exertion, associated with pressure like, mid, upper sternal chest discomfort, over the last 4 days, referred to ED by OP provider, s/p aspirin and nitroglycerin which provided improvement in symptoms in the ED, found to have EKG showing A -fib RVR,     INTERVAL HPI/OVERNIGHT EVENTS:   no events  patient is resting comfortably    MEDICATIONS  (STANDING):  aMIOdarone    Tablet 200 milliGRAM(s) Oral daily  atorvastatin 10 milliGRAM(s) Oral at bedtime  cefTRIAXone   IVPB      cefTRIAXone   IVPB 1000 milliGRAM(s) IV Intermittent every 24 hours  hydrALAZINE 25 milliGRAM(s) Oral three times a day  insulin lispro (ADMELOG) corrective regimen sliding scale   SubCutaneous three times a day before meals  insulin lispro (ADMELOG) corrective regimen sliding scale   SubCutaneous at bedtime  isosorbide   mononitrate ER Tablet (IMDUR) 30 milliGRAM(s) Oral daily  metoprolol tartrate 12.5 milliGRAM(s) Oral two times a day  montelukast 10 milliGRAM(s) Oral daily  pantoprazole    Tablet 40 milliGRAM(s) Oral before breakfast  rivaroxaban 15 milliGRAM(s) Oral with dinner  sodium chloride 0.9%. 1000 milliLiter(s) (60 mL/Hr) IV Continuous <Continuous>    MEDICATIONS  (PRN):      REVIEW OF SYSTEMS:  CONSTITUTIONAL: No fever, weight loss, or fatigue  RESPIRATORY: No cough, wheezing, chills or hemoptysis; No shortness of breath  CARDIOVASCULAR: No chest pain, palpitations, dizziness, or leg swelling  GASTROINTESTINAL: No abdominal pain. No nausea, vomiting, or hematemesis; No diarrhea or constipation. No melena or hematochezia.  GENITOURINARY: No dysuria or hematuria, urinary frequency  NEUROLOGICAL: No headaches, memory loss, loss of strength, numbness, or tremors  SKIN: No itching, burning, rashes, or lesions     Vital Signs Last 24 Hrs  T(C): 36.9 (23 Jul 2022 11:15), Max: 36.9 (23 Jul 2022 11:15)  T(F): 98.4 (23 Jul 2022 11:15), Max: 98.4 (23 Jul 2022 11:15)  HR: 79 (23 Jul 2022 11:15) (65 - 79)  BP: 160/84 (23 Jul 2022 11:15) (144/68 - 160/84)  BP(mean): --  RR: 18 (23 Jul 2022 11:15) (18 - 18)  SpO2: 96% (23 Jul 2022 11:15) (95% - 96%)    Parameters below as of 23 Jul 2022 11:15  Patient On (Oxygen Delivery Method): room air        PHYSICAL EXAMINATION:  GENERAL: NAD, obesity  HEAD:  Atraumatic, Normocephalic  EYES:  conjunctiva and sclera clear  NECK: Supple, No JVD  CHEST/LUNG: Clear to auscultation. Clear to percussion bilaterally; No rales, rhonchi, wheezing, or rubs  HEART: Regular rate and rhythm; No murmurs, rubs, or gallops  ABDOMEN: Soft, Nontender, Nondistended; Bowel sounds present  NERVOUS SYSTEM:  Alert & Oriented X3,    EXTREMITIES:  2+ Peripheral Pulses, No clubbing, cyanosis, or edema  SKIN: warm dry                          16.4   9.84  )-----------( 199      ( 23 Jul 2022 05:33 )             49.2     07-23    141  |  107  |  19<H>  ----------------------------<  139<H>  4.6   |  28  |  1.69<H>    Ca    9.2      23 Jul 2022 05:33  Phos  2.2     07-23  Mg     2.5     07-23    TPro  7.2  /  Alb  3.1<L>  /  TBili  0.8  /  DBili  x   /  AST  21  /  ALT  36  /  AlkPhos  59  07-23    LIVER FUNCTIONS - ( 23 Jul 2022 05:33 )  Alb: 3.1 g/dL / Pro: 7.2 g/dL / ALK PHOS: 59 U/L / ALT: 36 U/L DA / AST: 21 U/L / GGT: x                   CAPILLARY BLOOD GLUCOSE      RADIOLOGY & ADDITIONAL TESTS:

## 2022-07-23 NOTE — PROGRESS NOTE ADULT - SUBJECTIVE AND OBJECTIVE BOX
CHIEF COMPLAINT:Patient is a 87y old  Male who presents with a chief complaint of Chest pain.Pt appears comfortable.    	  REVIEW OF SYSTEMS:  CONSTITUTIONAL: No fever, weight loss, or fatigue  EYES: No eye pain, visual disturbances, or discharge  ENT:  No difficulty hearing, tinnitus, vertigo; No sinus or throat pain  NECK: No pain or stiffness  RESPIRATORY: No cough, wheezing, chills or hemoptysis; No Shortness of Breath  CARDIOVASCULAR: No chest pain, palpitations, passing out, dizziness, or leg swelling  GASTROINTESTINAL: No abdominal or epigastric pain. No nausea, vomiting, or hematemesis; No diarrhea or constipation. No melena or hematochezia.  GENITOURINARY: No dysuria, frequency, hematuria, or incontinence  NEUROLOGICAL: No headaches, memory loss, loss of strength, numbness, or tremors  SKIN: No itching, burning, rashes, or lesions   LYMPH Nodes: No enlarged glands  ENDOCRINE: No heat or cold intolerance; No hair loss  MUSCULOSKELETAL: No joint pain or swelling; No muscle, back, or extremity pain  PSYCHIATRIC: No depression, anxiety, mood swings, or difficulty sleeping  HEME/LYMPH: No easy bruising, or bleeding gums  ALLERGY AND IMMUNOLOGIC: No hives or eczema	    PHYSICAL EXAM:  T(C): 36.7 (07-23-22 @ 07:17), Max: 36.7 (07-23-22 @ 04:45)  HR: 69 (07-23-22 @ 07:17) (65 - 74)  BP: 154/69 (07-23-22 @ 07:17) (132/63 - 157/69)  RR: 18 (07-23-22 @ 07:17) (18 - 18)  SpO2: 95% (07-23-22 @ 07:17) (95% - 96%)  Wt(kg): --  I&O's Summary    23 Jul 2022 07:01  -  23 Jul 2022 10:32  --------------------------------------------------------  IN: 200 mL / OUT: 0 mL / NET: 200 mL        Appearance: Normal	  HEENT:   Normal oral mucosa, PERRL, EOMI	  Lymphatic: No lymphadenopathy  Cardiovascular: Normal S1 S2, No JVD, No murmurs, No edema  Respiratory: Lungs clear to auscultation	  Psychiatry: A & O x 3, Mood & affect appropriate  Gastrointestinal:  Soft, Non-tender, + BS	  Skin: No rashes, No ecchymoses, No cyanosis	  Neurologic: Non-focal  Extremities: Normal range of motion, No clubbing, cyanosis or edema  Vascular: Peripheral pulses palpable 2+ bilaterally    MEDICATIONS  (STANDING):  aMIOdarone    Tablet 200 milliGRAM(s) Oral daily  atorvastatin 10 milliGRAM(s) Oral at bedtime  cefTRIAXone   IVPB      cefTRIAXone   IVPB 1000 milliGRAM(s) IV Intermittent every 24 hours  hydrALAZINE 25 milliGRAM(s) Oral three times a day  insulin lispro (ADMELOG) corrective regimen sliding scale   SubCutaneous three times a day before meals  insulin lispro (ADMELOG) corrective regimen sliding scale   SubCutaneous at bedtime  isosorbide   mononitrate ER Tablet (IMDUR) 30 milliGRAM(s) Oral daily  metoprolol tartrate 12.5 milliGRAM(s) Oral two times a day  montelukast 10 milliGRAM(s) Oral daily  pantoprazole    Tablet 40 milliGRAM(s) Oral before breakfast  rivaroxaban 15 milliGRAM(s) Oral with dinner  sodium chloride 0.9%. 1000 milliLiter(s) (60 mL/Hr) IV Continuous <Continuous>      TELEMETRY: 	  afib 50-70's    LABS:	 	                       16.4   9.84  )-----------( 199      ( 23 Jul 2022 05:33 )             49.2     07-23    141  |  107  |  19<H>  ----------------------------<  139<H>  4.6   |  28  |  1.69<H>    Ca    9.2      23 Jul 2022 05:33  Phos  2.2     07-23  Mg     2.5     07-23    TPro  7.2  /  Alb  3.1<L>  /  TBili  0.8  /  DBili  x   /  AST  21  /  ALT  36  /  AlkPhos  59  07-23    proBNP: Serum Pro-Brain Natriuretic Peptide: 1225 pg/mL (07-18 @ 10:04)    Lipid Profile: Cholesterol 156  LDL --  HDL 43    Ldl calc 82    TSH: Thyroid Stimulating Hormone, Serum: 1.99 uU/mL (07-19 @ 06:58)      	    < from: Nuclear Stress Test-Pharmacologic (07.22.22 @ 08:20) >  IMPRESSIONS:Abnormal Study  * Negative ECG evidence of ischemia after IV of Lexiscan.  * Review of raw data shows: The study is of good technical  quality.  * There are small, severe defects in basal  and mid  inferior, basal infero-lateral walls that are reversible  consistent with  ischemia.  * The inferior and infero-lateral walls are hypokinesis  EF=45%.    ------------------------------------------------------------------------      ------------------------------------------------------------------------    Confirmed on  7/22/2022 - 16:06:59 at [facility] by  Eva Almeida MD    < end of copied text >

## 2022-07-23 NOTE — PROGRESS NOTE ADULT - SUBJECTIVE AND OBJECTIVE BOX
Patient is a 87y old  Male who presents with a chief complaint of Chest pain (22 Jul 2022 14:26)    pt seen in tele [ x ], reg med floor [   ], bed [ x ], chair at bedside [   ], a+o x3 [ x ], lethargic [  ],    nad [ x ]        Allergies    No Known Allergies        Vitals    T(F): 98 (07-23-22 @ 04:45), Max: 98.2 (07-22-22 @ 07:24)  HR: 74 (07-23-22 @ 04:45) (65 - 76)  BP: 150/74 (07-23-22 @ 04:45) (132/63 - 162/64)  RR: 18 (07-23-22 @ 04:45) (18 - 19)  SpO2: 95% (07-23-22 @ 04:45) (95% - 96%)  Wt(kg): --  CAPILLARY BLOOD GLUCOSE      POCT Blood Glucose.: 140 mg/dL (22 Jul 2022 21:05)      Labs                          16.0   13.07 )-----------( 213      ( 22 Jul 2022 06:44 )             48.0       07-22    141  |  108  |  23<H>  ----------------------------<  154<H>  5.1   |  28  |  1.81<H>    Ca    9.2      22 Jul 2022 06:44  Phos  2.5     07-22  Mg     2.5     07-22    TPro  7.3  /  Alb  3.1<L>  /  TBili  0.7  /  DBili  x   /  AST  29  /  ALT  37  /  AlkPhos  61  07-22                Radiology Results      Meds    MEDICATIONS  (STANDING):  atorvastatin 10 milliGRAM(s) Oral at bedtime  cefTRIAXone   IVPB      cefTRIAXone   IVPB 1000 milliGRAM(s) IV Intermittent every 24 hours  hydrALAZINE 25 milliGRAM(s) Oral three times a day  insulin lispro (ADMELOG) corrective regimen sliding scale   SubCutaneous three times a day before meals  insulin lispro (ADMELOG) corrective regimen sliding scale   SubCutaneous at bedtime  metoprolol tartrate 12.5 milliGRAM(s) Oral two times a day  montelukast 10 milliGRAM(s) Oral daily  pantoprazole    Tablet 40 milliGRAM(s) Oral before breakfast  rivaroxaban 15 milliGRAM(s) Oral with dinner  sodium chloride 0.9%. 1000 milliLiter(s) (60 mL/Hr) IV Continuous <Continuous>      MEDICATIONS  (PRN):      Physical Exam    Neuro :  no focal deficits  Respiratory: CTA B/L  CV: RRR, S1S2, no murmurs,   Abdominal: Soft, NT, ND +BS,  Extremities: No edema, + peripheral pulses    ASSESSMENT    Neuro :  no focal deficits  Respiratory: CTA B/L  CV: RRR, S1S2, no murmurs,   Abdominal: Soft, NT, ND +BS,  Extremities: No edema, + peripheral pulses    ASSESSMENT    chest pain,    r/o acs,   sob,   r/o chf,   uti   amy  h/o COPD   KENRICK on CPAP,   diabetes.   A fib on Xarelto,   RCC s/p right nephrectomy many years ago,   CKD      PLAN    cont tele,   acs protocol,   aspirin, statin,   trop x 3 noted above  cardio f/u   echo with Normal left ventricular systolic function, pulm htn noted above  amiodarone 400mg bid loading day 4,  cont  lopressor 12.5mg q12h 2nd to pamela cardia  r/o tachy-pamela synd.   f/u stress test in   ua positive  cont rocephin  to complete 5 days  f/u ucx   monitor serum creat   pulm f/u   Sleep study  PFTs as OP  oxygen supp  CPAP at night  Bronchodilators  Symbicort /4.5 mcgs 2 puffs po BID.hgba1c 6.4 noted above  lispro ss,   cont current meds         Patient is a 87y old  Male who presents with a chief complaint of Chest pain (22 Jul 2022 14:26)    pt seen in tele [ x ], reg med floor [   ], bed [ x ], chair at bedside [   ], a+o x3 [ x ], lethargic [  ],    nad [ x ]        Allergies    No Known Allergies        Vitals    T(F): 98 (07-23-22 @ 04:45), Max: 98.2 (07-22-22 @ 07:24)  HR: 74 (07-23-22 @ 04:45) (65 - 76)  BP: 150/74 (07-23-22 @ 04:45) (132/63 - 162/64)  RR: 18 (07-23-22 @ 04:45) (18 - 19)  SpO2: 95% (07-23-22 @ 04:45) (95% - 96%)  Wt(kg): --  CAPILLARY BLOOD GLUCOSE      POCT Blood Glucose.: 140 mg/dL (22 Jul 2022 21:05)      Labs                          16.0   13.07 )-----------( 213      ( 22 Jul 2022 06:44 )             48.0       07-22    141  |  108  |  23<H>  ----------------------------<  154<H>  5.1   |  28  |  1.81<H>    Ca    9.2      22 Jul 2022 06:44  Phos  2.5     07-22  Mg     2.5     07-22    TPro  7.3  /  Alb  3.1<L>  /  TBili  0.7  /  DBili  x   /  AST  29  /  ALT  37  /  AlkPhos  61  07-22      < from: Nuclear Stress Test-Pharmacologic (07.22.22 @ 08:20) >  IMPRESSIONS:Abnormal Study  * Negative ECG evidence of ischemia after IV of Lexiscan.  * Review of raw data shows: The study is of good technical  quality.  * There are small, severe defects in basal  and mid  inferior, basal infero-lateral walls that are reversible  consistent with  ischemia.  * The inferior and infero-lateral walls are hypokinesis  EF=45%.    < end of copied text >            Radiology Results      Meds    MEDICATIONS  (STANDING):  atorvastatin 10 milliGRAM(s) Oral at bedtime  cefTRIAXone   IVPB      cefTRIAXone   IVPB 1000 milliGRAM(s) IV Intermittent every 24 hours  hydrALAZINE 25 milliGRAM(s) Oral three times a day  insulin lispro (ADMELOG) corrective regimen sliding scale   SubCutaneous three times a day before meals  insulin lispro (ADMELOG) corrective regimen sliding scale   SubCutaneous at bedtime  metoprolol tartrate 12.5 milliGRAM(s) Oral two times a day  montelukast 10 milliGRAM(s) Oral daily  pantoprazole    Tablet 40 milliGRAM(s) Oral before breakfast  rivaroxaban 15 milliGRAM(s) Oral with dinner  sodium chloride 0.9%. 1000 milliLiter(s) (60 mL/Hr) IV Continuous <Continuous>      MEDICATIONS  (PRN):      Physical Exam    Neuro :  no focal deficits  Respiratory: CTA B/L  CV: RRR, S1S2, no murmurs,   Abdominal: Soft, NT, ND +BS,  Extremities: No edema, + peripheral pulses    ASSESSMENT    Neuro :  no focal deficits  Respiratory: CTA B/L  CV: RRR, S1S2, no murmurs,   Abdominal: Soft, NT, ND +BS,  Extremities: No edema, + peripheral pulses    ASSESSMENT    chest pain 2nd to acs    sob,   r/o chf,   uti   amy  h/o COPD   KENRICK on CPAP,   diabetes.   A fib on Xarelto,   RCC s/p right nephrectomy many years ago,   CKD      PLAN    cont tele,   acs protocol,   aspirin, statin,   trop x 3 noted    cardio f/u   echo with Normal left ventricular systolic function, pulm htn noted    completed amiodarone load   cont amio maintenance   cont  lopressor 12.5mg q12h 2nd to pamela cardia  r/o tachy-pamela synd.   stress test positive noted above   ua positive  cont rocephin  to complete 5 days  f/u ucx   monitor serum creat   renal cons   pulm f/u   Sleep study  PFTs as OP  oxygen supp  CPAP at night  Bronchodilators  Symbicort /4.5 mcgs 2 puffs po BID.hgba1c 6.4 noted above  lispro ss,   cont current meds

## 2022-07-23 NOTE — CONSULT NOTE ADULT - ASSESSMENT
87 yo PMH of COPD and KENRICK on CPAP, diabetes,Parox  A fib on Xarelto, RCC s/p right nephrectomy many years ago, CKD, Obese male who lives at home with wife and son, ambulates at baseline, comes in to the ED, due to gradual onset, worsening, intermittent shortness of breath, worse with exertion, associated with pressure like, mid, upper sternal chest discomfort,Afib with RVR.  1.Tele monitoring.  2.Parox Afib with RVR-amiodarone loading,dig .5mg ivx1,xarelto,add lopressor 25mg q8h.  3.COPD,KENRICK-CPA,Pulm.  4.DM-Insulin.  5.CRI-f/u lytes,hold ARB.  6.Lipid d/o-statin.  7.Echocardiogram.  8.PPI.
A/P  ACC TO  PT  NOT  AWARE OF  ANY  KIDNEY  ISSUES IN  THE  PAST     HIS  CR  WAS  AT  1.6  WHEN  HE  CAME IN  THE  HOSP    REMAINS  ABOUT THE  SAME     BICARB  AND  K NORMAL  WILL FOLLOW  BP  ON  CURRENT MEDS      HAS  ABNORMAL  STRESS  TEST, TO  GO  FOR  C  CATH    SHOULD  GET  1/2  NS    WITH  AN AMP  OF  BICARB    ,  AND  MUCOMYST  12  HRS  PRE  AND  POST   PROCEDURE    HAS  THE  RISKS OF  DECLINE IN  RENAL  FUNCTION    WILL MONITOR    D/W  PT
Patient is an 85 yo PMH of COPD and KENRICK on CPAP, diabetes. A fib on Xarelto, RCC s/p right nephrectomy many years ago, CKD, Obese male who lives at home with wife and son, ambulates at baseline, comes in to the ED, due to gradual onset, worsening, intermittent shortness of breath, worse with exertion, associated with pressure like, mid, upper sternal chest discomfort, over the last 4 days, referred to ED by OP provider, s/p aspirin and nitroglycerin which provided improvement in symptoms in the ED, found to have EKG showing A -fib RVR,     Labs: WBC 11, Cr. 1.7 BNP 1200s, trop 15  CXR: wet read: vascular congestion  Hence patient was admitted for A-fib RVR.

## 2022-07-23 NOTE — PROGRESS NOTE ADULT - PROBLEM SELECTOR PLAN 1
Comes with  4 days of worsening shortness of breath, palpitations, and chest pain  Found with EKG showing RVR 120s  s/p nitro, and felt better, the rate is now controlled  Patient's most recent echo in 2020 with EF 58%  No rate control at home, but on xarelto  BNP in 1200  Trop 331>284 - likely 2/2 demand ischemia  - CXR read - cardiomegaly, no pulmonary effusions  -Diuresis on stand by, Cr. 1.7, patient is in NAD, and no crackles heard, despite mild edema      cardiology Dr. Almeida consutled   fu Echo - Mild MR, TR, AL, Moderate concentric left ventricular hypertrophy  Dx. Parox Afib with RVR-amiodarone loading later decreased to 400 BID, dig .5mg ivx1,xarelto,add lopressor 25mg q8h now q12  advised to hold diuretics due to CRi  for cath on Monday  tele to r/o tachy pamela  Hydralazine added to control BP

## 2022-07-23 NOTE — CONSULT NOTE ADULT - CONSULT REASON
- Enema ordered 1/20 and 1/21 with BM  - Encourage ambulation    
elevated  cr
CP,Afib with RVR
Obstructive Sleep Apnea, Dyspnea on exertion

## 2022-07-23 NOTE — PROGRESS NOTE ADULT - PROBLEM SELECTOR PLAN 2
sCr - 2.04 -> 1.81---> 2.69  urine sodium - 19  urine osmolality - 630  urine creatinine - 174  FENA <.1% = prerenal GREGORY  cw fluids  Unknown base line  Improved overall

## 2022-07-24 LAB
ALBUMIN SERPL ELPH-MCNC: 3 G/DL — LOW (ref 3.5–5)
ALP SERPL-CCNC: 58 U/L — SIGNIFICANT CHANGE UP (ref 40–120)
ALT FLD-CCNC: 32 U/L DA — SIGNIFICANT CHANGE UP (ref 10–60)
ANION GAP SERPL CALC-SCNC: 9 MMOL/L — SIGNIFICANT CHANGE UP (ref 5–17)
APTT BLD: 36.8 SEC — HIGH (ref 27.5–35.5)
APTT BLD: 48.6 SEC — HIGH (ref 27.5–35.5)
APTT BLD: 51.6 SEC — HIGH (ref 27.5–35.5)
AST SERPL-CCNC: 20 U/L — SIGNIFICANT CHANGE UP (ref 10–40)
BILIRUB SERPL-MCNC: 0.8 MG/DL — SIGNIFICANT CHANGE UP (ref 0.2–1.2)
BUN SERPL-MCNC: 23 MG/DL — HIGH (ref 7–18)
CALCIUM SERPL-MCNC: 8.7 MG/DL — SIGNIFICANT CHANGE UP (ref 8.4–10.5)
CHLORIDE SERPL-SCNC: 105 MMOL/L — SIGNIFICANT CHANGE UP (ref 96–108)
CO2 SERPL-SCNC: 25 MMOL/L — SIGNIFICANT CHANGE UP (ref 22–31)
CREAT SERPL-MCNC: 1.83 MG/DL — HIGH (ref 0.5–1.3)
EGFR: 36 ML/MIN/1.73M2 — LOW
GLUCOSE BLDC GLUCOMTR-MCNC: 134 MG/DL — HIGH (ref 70–99)
GLUCOSE BLDC GLUCOMTR-MCNC: 142 MG/DL — HIGH (ref 70–99)
GLUCOSE BLDC GLUCOMTR-MCNC: 200 MG/DL — HIGH (ref 70–99)
GLUCOSE SERPL-MCNC: 136 MG/DL — HIGH (ref 70–99)
HCT VFR BLD CALC: 46.8 % — SIGNIFICANT CHANGE UP (ref 39–50)
HGB BLD-MCNC: 15.5 G/DL — SIGNIFICANT CHANGE UP (ref 13–17)
MAGNESIUM SERPL-MCNC: 2.4 MG/DL — SIGNIFICANT CHANGE UP (ref 1.6–2.6)
MCHC RBC-ENTMCNC: 32.2 PG — SIGNIFICANT CHANGE UP (ref 27–34)
MCHC RBC-ENTMCNC: 33.1 GM/DL — SIGNIFICANT CHANGE UP (ref 32–36)
MCV RBC AUTO: 97.1 FL — SIGNIFICANT CHANGE UP (ref 80–100)
NRBC # BLD: 0 /100 WBCS — SIGNIFICANT CHANGE UP (ref 0–0)
PHOSPHATE SERPL-MCNC: 2.7 MG/DL — SIGNIFICANT CHANGE UP (ref 2.5–4.5)
PLATELET # BLD AUTO: 190 K/UL — SIGNIFICANT CHANGE UP (ref 150–400)
POTASSIUM SERPL-MCNC: 4.2 MMOL/L — SIGNIFICANT CHANGE UP (ref 3.5–5.3)
POTASSIUM SERPL-SCNC: 4.2 MMOL/L — SIGNIFICANT CHANGE UP (ref 3.5–5.3)
PROT SERPL-MCNC: 6.8 G/DL — SIGNIFICANT CHANGE UP (ref 6–8.3)
RBC # BLD: 4.82 M/UL — SIGNIFICANT CHANGE UP (ref 4.2–5.8)
RBC # FLD: 13.4 % — SIGNIFICANT CHANGE UP (ref 10.3–14.5)
SODIUM SERPL-SCNC: 139 MMOL/L — SIGNIFICANT CHANGE UP (ref 135–145)
WBC # BLD: 10.93 K/UL — HIGH (ref 3.8–10.5)
WBC # FLD AUTO: 10.93 K/UL — HIGH (ref 3.8–10.5)

## 2022-07-24 RX ORDER — HEPARIN SODIUM 5000 [USP'U]/ML
900 INJECTION INTRAVENOUS; SUBCUTANEOUS
Qty: 25000 | Refills: 0 | Status: DISCONTINUED | OUTPATIENT
Start: 2022-07-24 | End: 2022-07-25

## 2022-07-24 RX ADMIN — Medication 25 MILLIGRAM(S): at 21:26

## 2022-07-24 RX ADMIN — HEPARIN SODIUM 900 UNIT(S)/HR: 5000 INJECTION INTRAVENOUS; SUBCUTANEOUS at 11:09

## 2022-07-24 RX ADMIN — ATORVASTATIN CALCIUM 10 MILLIGRAM(S): 80 TABLET, FILM COATED ORAL at 21:26

## 2022-07-24 RX ADMIN — Medication 1200 MILLIGRAM(S): at 06:53

## 2022-07-24 RX ADMIN — Medication 1: at 12:17

## 2022-07-24 RX ADMIN — Medication 25 MILLIGRAM(S): at 06:31

## 2022-07-24 RX ADMIN — CEFTRIAXONE 100 MILLIGRAM(S): 500 INJECTION, POWDER, FOR SOLUTION INTRAMUSCULAR; INTRAVENOUS at 08:03

## 2022-07-24 RX ADMIN — Medication 1200 MILLIGRAM(S): at 17:57

## 2022-07-24 RX ADMIN — Medication 12.5 MILLIGRAM(S): at 06:31

## 2022-07-24 RX ADMIN — AMIODARONE HYDROCHLORIDE 200 MILLIGRAM(S): 400 TABLET ORAL at 06:31

## 2022-07-24 RX ADMIN — MONTELUKAST 10 MILLIGRAM(S): 4 TABLET, CHEWABLE ORAL at 12:18

## 2022-07-24 RX ADMIN — Medication 25 MILLIGRAM(S): at 13:31

## 2022-07-24 RX ADMIN — HEPARIN SODIUM 1300 UNIT(S)/HR: 5000 INJECTION INTRAVENOUS; SUBCUTANEOUS at 23:51

## 2022-07-24 RX ADMIN — PANTOPRAZOLE SODIUM 40 MILLIGRAM(S): 20 TABLET, DELAYED RELEASE ORAL at 06:31

## 2022-07-24 RX ADMIN — HEPARIN SODIUM 1100 UNIT(S)/HR: 5000 INJECTION INTRAVENOUS; SUBCUTANEOUS at 17:19

## 2022-07-24 RX ADMIN — Medication 12.5 MILLIGRAM(S): at 17:11

## 2022-07-24 RX ADMIN — ISOSORBIDE MONONITRATE 30 MILLIGRAM(S): 60 TABLET, EXTENDED RELEASE ORAL at 12:18

## 2022-07-24 NOTE — PROGRESS NOTE ADULT - PROBLEM SELECTOR PLAN 2
sCr - 2.04 -> 1.81->1.83  urine sodium - 19  urine osmolality - 630  urine creatinine - 174  FENA <.1% = prerenal GREGORY  cw fluids  Unknown base line

## 2022-07-24 NOTE — PROGRESS NOTE ADULT - SUBJECTIVE AND OBJECTIVE BOX
CHIEF COMPLAINT:Patient is a 87y old  Male who presents with a chief complaint of Chest pain .Pt appears comfortable.    	  REVIEW OF SYSTEMS:  CONSTITUTIONAL: No fever, weight loss, or fatigue  EYES: No eye pain, visual disturbances, or discharge  ENT:  No difficulty hearing, tinnitus, vertigo; No sinus or throat pain  NECK: No pain or stiffness  RESPIRATORY: No cough, wheezing, chills or hemoptysis; No Shortness of Breath  CARDIOVASCULAR: No chest pain, palpitations, passing out, dizziness, or leg swelling  GASTROINTESTINAL: No abdominal or epigastric pain. No nausea, vomiting, or hematemesis; No diarrhea or constipation. No melena or hematochezia.  GENITOURINARY: No dysuria, frequency, hematuria, or incontinence  NEUROLOGICAL: No headaches, memory loss, loss of strength, numbness, or tremors  SKIN: No itching, burning, rashes, or lesions   LYMPH Nodes: No enlarged glands  ENDOCRINE: No heat or cold intolerance; No hair loss  MUSCULOSKELETAL: No joint pain or swelling; No muscle, back, or extremity pain  PSYCHIATRIC: No depression, anxiety, mood swings, or difficulty sleeping  HEME/LYMPH: No easy bruising, or bleeding gums  ALLERGY AND IMMUNOLOGIC: No hives or eczema	      PHYSICAL EXAM:  T(C): 36.9 (07-24-22 @ 04:57), Max: 37 (07-23-22 @ 23:50)  HR: 74 (07-24-22 @ 04:57) (61 - 79)  BP: 131/72 (07-24-22 @ 04:57) (120/66 - 160/84)  RR: 20 (07-24-22 @ 04:57) (18 - 20)  SpO2: 99% (07-24-22 @ 04:57) (96% - 99%)  Wt(kg): --  I&O's Summary    23 Jul 2022 07:01  -  24 Jul 2022 07:00  --------------------------------------------------------  IN: 730 mL / OUT: 0 mL / NET: 730 mL        Appearance: Normal	  HEENT:   Normal oral mucosa, PERRL, EOMI	  Lymphatic: No lymphadenopathy  Cardiovascular: Normal S1 S2, No JVD, No murmurs, No edema  Respiratory: Lungs clear to auscultation	  Psychiatry: A & O x 3, Mood & affect appropriate  Gastrointestinal:  Soft, Non-tender, + BS	  Skin: No rashes, No ecchymoses, No cyanosis	  Neurologic: Non-focal  Extremities: Normal range of motion, No clubbing, cyanosis or edema  Vascular: Peripheral pulses palpable 2+ bilaterally    MEDICATIONS  (STANDING):  acetylcysteine  Oral Solution 1200 milliGRAM(s) Oral every 12 hours  aMIOdarone    Tablet 200 milliGRAM(s) Oral daily  atorvastatin 10 milliGRAM(s) Oral at bedtime  heparin  Infusion. 900 Unit(s)/Hr (9 mL/Hr) IV Continuous <Continuous>  hydrALAZINE 25 milliGRAM(s) Oral three times a day  insulin lispro (ADMELOG) corrective regimen sliding scale   SubCutaneous three times a day before meals  insulin lispro (ADMELOG) corrective regimen sliding scale   SubCutaneous at bedtime  isosorbide   mononitrate ER Tablet (IMDUR) 30 milliGRAM(s) Oral daily  metoprolol tartrate 12.5 milliGRAM(s) Oral two times a day  montelukast 10 milliGRAM(s) Oral daily  pantoprazole    Tablet 40 milliGRAM(s) Oral before breakfast  sodium chloride 0.9%. 1000 milliLiter(s) (60 mL/Hr) IV Continuous <Continuous>      TELEMETRY: afib 60-70's	    	  	  LABS:	 	                         15.5   10.93 )-----------( 190      ( 24 Jul 2022 05:43 )             46.8     07-24    139  |  105  |  23<H>  ----------------------------<  136<H>  4.2   |  25  |  1.83<H>    Ca    8.7      24 Jul 2022 05:43  Phos  2.7     07-24  Mg     2.4     07-24    TPro  6.8  /  Alb  3.0<L>  /  TBili  0.8  /  DBili  x   /  AST  20  /  ALT  32  /  AlkPhos  58  07-24    proBNP: Serum Pro-Brain Natriuretic Peptide: 1225 pg/mL (07-18 @ 10:04)    Lipid Profile: Cholesterol 156  LDL --  HDL 43    Ldl calc 82  Ratio --      TSH: Thyroid Stimulating Hormone, Serum: 1.99 uU/mL (07-19 @ 06:58)

## 2022-07-24 NOTE — PROGRESS NOTE ADULT - SUBJECTIVE AND OBJECTIVE BOX
Patient is a 87y old  Male who presents with a chief complaint of Chest pain (23 Jul 2022 15:21)    pt seen in tele [ x ], reg med floor [   ], bed [ x ], chair at bedside [   ], a+o x3 [ x ], lethargic [  ],    nad [ x ]        Allergies    No Known Allergies        Vitals    T(F): 98.4 (07-24-22 @ 04:57), Max: 98.6 (07-23-22 @ 23:50)  HR: 74 (07-24-22 @ 04:57) (61 - 79)  BP: 131/72 (07-24-22 @ 04:57) (120/66 - 160/84)  RR: 20 (07-24-22 @ 04:57) (18 - 20)  SpO2: 99% (07-24-22 @ 04:57) (95% - 99%)  Wt(kg): --  CAPILLARY BLOOD GLUCOSE      POCT Blood Glucose.: 133 mg/dL (23 Jul 2022 21:28)      Labs                          15.5   10.93 )-----------( 190      ( 24 Jul 2022 05:43 )             46.8       07-23    141  |  107  |  19<H>  ----------------------------<  139<H>  4.6   |  28  |  1.69<H>    Ca    9.2      23 Jul 2022 05:33  Phos  2.2     07-23  Mg     2.5     07-23    TPro  7.2  /  Alb  3.1<L>  /  TBili  0.8  /  DBili  x   /  AST  21  /  ALT  36  /  AlkPhos  59  07-23                Radiology Results      Meds    MEDICATIONS  (STANDING):  acetylcysteine  Oral Solution 1200 milliGRAM(s) Oral every 12 hours  aMIOdarone    Tablet 200 milliGRAM(s) Oral daily  atorvastatin 10 milliGRAM(s) Oral at bedtime  cefTRIAXone   IVPB      cefTRIAXone   IVPB 1000 milliGRAM(s) IV Intermittent every 24 hours  hydrALAZINE 25 milliGRAM(s) Oral three times a day  insulin lispro (ADMELOG) corrective regimen sliding scale   SubCutaneous three times a day before meals  insulin lispro (ADMELOG) corrective regimen sliding scale   SubCutaneous at bedtime  isosorbide   mononitrate ER Tablet (IMDUR) 30 milliGRAM(s) Oral daily  metoprolol tartrate 12.5 milliGRAM(s) Oral two times a day  montelukast 10 milliGRAM(s) Oral daily  pantoprazole    Tablet 40 milliGRAM(s) Oral before breakfast  rivaroxaban 15 milliGRAM(s) Oral with dinner  sodium chloride 0.9%. 1000 milliLiter(s) (60 mL/Hr) IV Continuous <Continuous>      MEDICATIONS  (PRN):      Physical Exam    Neuro :  no focal deficits  Respiratory: CTA B/L  CV: RRR, S1S2, no murmurs,   Abdominal: Soft, NT, ND +BS,  Extremities: No edema, + peripheral pulses    ASSESSMENT    Neuro :  no focal deficits  Respiratory: CTA B/L  CV: RRR, S1S2, no murmurs,   Abdominal: Soft, NT, ND +BS,  Extremities: No edema, + peripheral pulses    ASSESSMENT    chest pain 2nd to acs    sob,   r/o chf,   uti   amy  h/o COPD   KENRICK on CPAP,   diabetes.   A fib on Xarelto,   RCC s/p right nephrectomy many years ago,   CKD      PLAN    cont tele,   acs protocol,   aspirin, statin,   trop x 3 noted    cardio f/u   echo with Normal left ventricular systolic function, pulm htn noted    completed amiodarone load   cont amio maintenance   cont  lopressor 12.5mg q12h 2nd to pamela cardia  r/o tachy-pamela synd.   stress test positive noted above   ua positive  cont rocephin  to complete 5 days  f/u ucx   monitor serum creat   renal cons   pulm f/u   Sleep study  PFTs as OP  oxygen supp  CPAP at night  Bronchodilators  Symbicort /4.5 mcgs 2 puffs po BID.hgba1c 6.4 noted above  lispro ss,   cont current meds           Patient is a 87y old  Male who presents with a chief complaint of Chest pain (23 Jul 2022 15:21)    pt seen in tele [ x ], reg med floor [   ], bed [ x ], chair at bedside [   ], a+o x3 [ x ], lethargic [  ],    nad [ x ]        Allergies    No Known Allergies        Vitals    T(F): 98.4 (07-24-22 @ 04:57), Max: 98.6 (07-23-22 @ 23:50)  HR: 74 (07-24-22 @ 04:57) (61 - 79)  BP: 131/72 (07-24-22 @ 04:57) (120/66 - 160/84)  RR: 20 (07-24-22 @ 04:57) (18 - 20)  SpO2: 99% (07-24-22 @ 04:57) (95% - 99%)  Wt(kg): --  CAPILLARY BLOOD GLUCOSE      POCT Blood Glucose.: 133 mg/dL (23 Jul 2022 21:28)      Labs                          15.5   10.93 )-----------( 190      ( 24 Jul 2022 05:43 )             46.8       07-23    141  |  107  |  19<H>  ----------------------------<  139<H>  4.6   |  28  |  1.69<H>    Ca    9.2      23 Jul 2022 05:33  Phos  2.2     07-23  Mg     2.5     07-23    TPro  7.2  /  Alb  3.1<L>  /  TBili  0.8  /  DBili  x   /  AST  21  /  ALT  36  /  AlkPhos  59  07-23                Radiology Results      Meds    MEDICATIONS  (STANDING):  acetylcysteine  Oral Solution 1200 milliGRAM(s) Oral every 12 hours  aMIOdarone    Tablet 200 milliGRAM(s) Oral daily  atorvastatin 10 milliGRAM(s) Oral at bedtime  cefTRIAXone   IVPB      cefTRIAXone   IVPB 1000 milliGRAM(s) IV Intermittent every 24 hours  hydrALAZINE 25 milliGRAM(s) Oral three times a day  insulin lispro (ADMELOG) corrective regimen sliding scale   SubCutaneous three times a day before meals  insulin lispro (ADMELOG) corrective regimen sliding scale   SubCutaneous at bedtime  isosorbide   mononitrate ER Tablet (IMDUR) 30 milliGRAM(s) Oral daily  metoprolol tartrate 12.5 milliGRAM(s) Oral two times a day  montelukast 10 milliGRAM(s) Oral daily  pantoprazole    Tablet 40 milliGRAM(s) Oral before breakfast  rivaroxaban 15 milliGRAM(s) Oral with dinner  sodium chloride 0.9%. 1000 milliLiter(s) (60 mL/Hr) IV Continuous <Continuous>      MEDICATIONS  (PRN):      Physical Exam    Neuro :  no focal deficits  Respiratory: CTA B/L  CV: RRR, S1S2, no murmurs,   Abdominal: Soft, NT, ND +BS,  Extremities: No edema, + peripheral pulses    ASSESSMENT    Neuro :  no focal deficits  Respiratory: CTA B/L  CV: RRR, S1S2, no murmurs,   Abdominal: Soft, NT, ND +BS,  Extremities: No edema, + peripheral pulses    ASSESSMENT    chest pain 2nd to acs    sob,   r/o chf,   uti   amy  h/o COPD   KENRICK on CPAP,   diabetes.   A fib on Xarelto,   RCC s/p right nephrectomy many years ago,   CKD      PLAN    cont tele,   acs protocol,   aspirin, statin,   trop x 3 noted    cardio f/u   echo with Normal left ventricular systolic function, pulm htn noted    completed amiodarone load   cont amio maintenance 200mg daily   cont  lopressor 12.5mg q12h    cont imdur and hydralazine  r/o tachy-pamela synd.   stress test positive noted    pt for cardiac cath in am   ua positive  cont rocephin  to complete 5 days  f/u ucx   monitor serum creat   renal f/u   PT SHOULD  GET  1/2  NS    WITH  AN AMP  OF  BICARB    ,  AND  MUCOMYST  12  HRS  PRE  AND  POST   PROCEDURE  PT HAS  THE  RISKS OF  DECLINE IN  RENAL  FUNCTION   pulm f/u   Sleep study  PFTs as OP  oxygen supp  CPAP at night  Bronchodilators  Symbicort /4.5 mcgs 2 puffs po BID.hgba1c 6.4 noted above  lispro ss,   cont current meds

## 2022-07-24 NOTE — PROGRESS NOTE ADULT - PROBLEM SELECTOR PLAN 1
Comes with  4 days of worsening shortness of breath, palpitations, and chest pain  Found with EKG showing RVR 120s  s/p nitro, and felt better, the rate is now controlled  Patient's most recent echo in 2020 with EF 58%  No rate control at home, but on xarelto  BNP in 1200  Trop 331>284 - likely 2/2 demand ischemia  - CXR read - cardiomegaly, no pulmonary effusions  -Diuresis on stand by, Cr. 1.7, patient is in NAD, and no crackles heard, despite mild edema      cardiology Dr. Almeida consutled   fu Echo - Mild MR, TR, IL, Moderate concentric left ventricular hypertrophy  Dx. Parox Afib with RVR-amiodarone loading later decreased to 400 BID, dig .5mg ivx1,xarelto,add lopressor 25mg q8h now q12  advised to hold diuretics due to CRi  for cath on Monday  tele to r/o tachy pamela  Hydralazine added to control BP Comes with  4 days of worsening shortness of breath, palpitations, and chest pain  Found with EKG showing RVR 120s  s/p nitro, and felt better, the rate is now controlled  Patient's most recent echo in 2020 with EF 58%  No rate control at home, but on xarelto  BNP in 1200  Trop 331>284 - likely 2/2 demand ischemia  - CXR read - cardiomegaly, no pulmonary effusions  -Diuresis on stand by, Cr. 1.7, patient is in NAD, and no crackles heard, despite mild edema      cardiology Dr. Almeida consutled   fu Echo - Mild MR, TR, MO, Moderate concentric left ventricular hypertrophy  Dx. Parox Afib with RVR-amiodarone loading later decreased to 400 BID, dig .5mg ivx1,xarelto,add lopressor 25mg q8h now q12  advised to hold diuretics due to CRi  for cath on Monday  on heparin Gtt  tele to r/o tachy pamela  Hydralazine added to control BP

## 2022-07-24 NOTE — PROGRESS NOTE ADULT - SUBJECTIVE AND OBJECTIVE BOX
PGY-1 Progress Note discussed with attending    PAGER #: [[phone]] TILL 5:00 PM  PLEASE CONTACT ON CALL TEAM:  - On Call Team (Please refer to Sri) FROM 5:00 PM - 8:30PM  - Nightfloat Team FROM 8:30 -7:30 AM    INTERVAL HPI/OVERNIGHT EVENTS:   Patient seen and examined at bedside. No acute events overnight. No new complaints.     MEDICATIONS  (STANDING):  acetylcysteine  Oral Solution 1200 milliGRAM(s) Oral every 12 hours  aMIOdarone    Tablet 200 milliGRAM(s) Oral daily  atorvastatin 10 milliGRAM(s) Oral at bedtime  heparin  Infusion. 900 Unit(s)/Hr (9 mL/Hr) IV Continuous <Continuous>  hydrALAZINE 25 milliGRAM(s) Oral three times a day  insulin lispro (ADMELOG) corrective regimen sliding scale   SubCutaneous three times a day before meals  insulin lispro (ADMELOG) corrective regimen sliding scale   SubCutaneous at bedtime  isosorbide   mononitrate ER Tablet (IMDUR) 30 milliGRAM(s) Oral daily  metoprolol tartrate 12.5 milliGRAM(s) Oral two times a day  montelukast 10 milliGRAM(s) Oral daily  pantoprazole    Tablet 40 milliGRAM(s) Oral before breakfast  sodium chloride 0.9%. 1000 milliLiter(s) (60 mL/Hr) IV Continuous <Continuous>    MEDICATIONS  (PRN):      REVIEW OF SYSTEMS:  CONSTITUTIONAL: No fever, weight loss, or fatigue  RESPIRATORY: No cough, wheezing, chills or hemoptysis; No shortness of breath  CARDIOVASCULAR: No chest pain, palpitations, dizziness, or leg swelling  GASTROINTESTINAL: No abdominal pain. No nausea, vomiting, or hematemesis; No diarrhea or constipation. No melena or hematochezia.  GENITOURINARY: No dysuria or hematuria, urinary frequency  NEUROLOGICAL: No headaches, memory loss, loss of strength, numbness, or tremors  SKIN: No itching, burning, rashes, or lesions     Vital Signs Last 24 Hrs  T(C): 36.6 (24 Jul 2022 12:02), Max: 37 (23 Jul 2022 23:50)  T(F): 97.8 (24 Jul 2022 12:02), Max: 98.6 (23 Jul 2022 23:50)  HR: 70 (24 Jul 2022 13:30) (70 - 83)  BP: 131/68 (24 Jul 2022 13:30) (131/68 - 139/74)  BP(mean): --  RR: 18 (24 Jul 2022 12:02) (18 - 20)  SpO2: 97% (24 Jul 2022 12:02) (96% - 99%)    Parameters below as of 24 Jul 2022 12:02  Patient On (Oxygen Delivery Method): room air        PHYSICAL EXAMINATION:  GENERAL: NAD, well built  HEAD:  Atraumatic, Normocephalic  EYES:  conjunctiva and sclera clear  NECK: Supple, No JVD, Normal thyroid  CHEST/LUNG: Clear to auscultation. Clear to percussion bilaterally; No rales, rhonchi, wheezing, or rubs  HEART: Regular rate and rhythm; No murmurs, rubs, or gallops  ABDOMEN: Soft, Nontender, Nondistended; Bowel sounds present  NERVOUS SYSTEM:  Alert & Oriented X3,    EXTREMITIES:  2+ Peripheral Pulses, No clubbing, cyanosis, or edema  SKIN: warm dry                          15.5   10.93 )-----------( 190      ( 24 Jul 2022 05:43 )             46.8     07-24    139  |  105  |  23<H>  ----------------------------<  136<H>  4.2   |  25  |  1.83<H>    Ca    8.7      24 Jul 2022 05:43  Phos  2.7     07-24  Mg     2.4     07-24    TPro  6.8  /  Alb  3.0<L>  /  TBili  0.8  /  DBili  x   /  AST  20  /  ALT  32  /  AlkPhos  58  07-24    LIVER FUNCTIONS - ( 24 Jul 2022 05:43 )  Alb: 3.0 g/dL / Pro: 6.8 g/dL / ALK PHOS: 58 U/L / ALT: 32 U/L DA / AST: 20 U/L / GGT: x               PTT - ( 24 Jul 2022 10:37 )  PTT:36.8 sec    CAPILLARY BLOOD GLUCOSE      RADIOLOGY & ADDITIONAL TESTS:

## 2022-07-24 NOTE — PROGRESS NOTE ADULT - SUBJECTIVE AND OBJECTIVE BOX
Patient is a 87y Male with  ELEVATED  CR, UNSURE  OF HIS  BASELINE    GOING  FOR C  CATH  IN  AM    FEELS  WELL  AT THE TIME OF  EXAM    SITTING ON  CHAIR    No Known Allergies    Hospital Medications:   MEDICATIONS  (STANDING):  acetylcysteine  Oral Solution 1200 milliGRAM(s) Oral every 12 hours  aMIOdarone    Tablet 200 milliGRAM(s) Oral daily  atorvastatin 10 milliGRAM(s) Oral at bedtime  heparin  Infusion. 900 Unit(s)/Hr (9 mL/Hr) IV Continuous <Continuous>  hydrALAZINE 25 milliGRAM(s) Oral three times a day  insulin lispro (ADMELOG) corrective regimen sliding scale   SubCutaneous three times a day before meals  insulin lispro (ADMELOG) corrective regimen sliding scale   SubCutaneous at bedtime  isosorbide   mononitrate ER Tablet (IMDUR) 30 milliGRAM(s) Oral daily  metoprolol tartrate 12.5 milliGRAM(s) Oral two times a day  montelukast 10 milliGRAM(s) Oral daily  pantoprazole    Tablet 40 milliGRAM(s) Oral before breakfast  sodium chloride 0.9%. 1000 milliLiter(s) (60 mL/Hr) IV Continuous <Continuous>    REVIEW OF SYSTEMS:  HAS NO   FEVER  CHILLS   NO   SOB  OR  COUGH   NO CH  PAIN  / PALPITATIONS   APPETITE IS   GOOD, NO  N/V  OR  ABD  PAIN  VOIDING  WELL   NO LEG  SWELLING      VITALS:  T(F): 97.8 (22 @ 12:02), Max: 98.6 (22 @ 23:50)  HR: 75 (22 @ 12:02)  BP: 139/74 (22 @ 12:02)  RR: 18 (22 @ 12:02)  SpO2: 97% (22 @ 12:02)  Wt(kg): --     @ 07:01  -   @ 07:00  --------------------------------------------------------  IN: 730 mL / OUT: 0 mL / NET: 730 mL        PHYSICAL EXAM:  Constitutional: NAD  HEENT: CONJ P INK  Neck: No JVD  Respiratory: CTAB, no wheezes, rales or rhonchi  Cardiovascular: S1, S2, RRR  Gastrointestinal: BS+, soft, NT/ND  Extremities:  No peripheral edema  Neurological: A/O x 3,   : . No carlin.     :    LABS:      139  |  105  |  23<H>  ----------------------------<  136<H>  4.2   |  25  |  1.83<H>    Ca    8.7      2022 05:43  Phos  2.7       Mg     2.4         TPro  6.8  /  Alb  3.0<L>  /  TBili  0.8  /  DBili      /  AST  20  /  ALT  32  /  AlkPhos  58      Creatinine Trend: 1.83 <--, 1.69 <--, 1.81 <--, 2.04 <--, 1.92 <--, 1.66 <--, 1.72 <--                        15.5   10.93 )-----------( 190      ( 2022 05:43 )             46.8     Urine Studies:  Urinalysis Basic - ( 2022 21:40 )    Color: Yellow / Appearance: Clear / S.020 / pH:   Gluc:  / Ketone: Negative  / Bili: Negative / Urobili: Negative   Blood:  / Protein: 100 / Nitrite: Negative   Leuk Esterase: Moderate / RBC: 0-2 /HPF / WBC 6-10 /HPF   Sq Epi:  / Non Sq Epi: Few /HPF / Bacteria: Few /HPF      Osmolality, Random Urine: 630 mos/kg ( @ 21:40)  Sodium, Random Urine: 19 mmol/L ( @ 21:40)  Creatinine, Random Urine: 174 mg/dL ( @ 21:40)    RADIOLOGY & ADDITIONAL STUDIES:

## 2022-07-25 ENCOUNTER — INPATIENT (INPATIENT)
Facility: HOSPITAL | Age: 87
LOS: 2 days | Discharge: HOME HEALTH | End: 2022-07-28
Attending: INTERNAL MEDICINE | Admitting: INTERNAL MEDICINE

## 2022-07-25 VITALS
SYSTOLIC BLOOD PRESSURE: 151 MMHG | OXYGEN SATURATION: 98 % | HEART RATE: 64 BPM | DIASTOLIC BLOOD PRESSURE: 76 MMHG | WEIGHT: 245.15 LBS | RESPIRATION RATE: 18 BRPM | TEMPERATURE: 98 F

## 2022-07-25 VITALS
SYSTOLIC BLOOD PRESSURE: 129 MMHG | TEMPERATURE: 98 F | RESPIRATION RATE: 18 BRPM | OXYGEN SATURATION: 98 % | HEART RATE: 84 BPM | DIASTOLIC BLOOD PRESSURE: 74 MMHG

## 2022-07-25 DIAGNOSIS — I21.4 NON-ST ELEVATION (NSTEMI) MYOCARDIAL INFARCTION: ICD-10-CM

## 2022-07-25 DIAGNOSIS — Z90.49 ACQUIRED ABSENCE OF OTHER SPECIFIED PARTS OF DIGESTIVE TRACT: Chronic | ICD-10-CM

## 2022-07-25 DIAGNOSIS — Z90.5 ACQUIRED ABSENCE OF KIDNEY: Chronic | ICD-10-CM

## 2022-07-25 PROBLEM — Z86.79 PERSONAL HISTORY OF OTHER DISEASES OF THE CIRCULATORY SYSTEM: Chronic | Status: ACTIVE | Noted: 2022-07-18

## 2022-07-25 PROBLEM — G47.33 OBSTRUCTIVE SLEEP APNEA (ADULT) (PEDIATRIC): Chronic | Status: ACTIVE | Noted: 2022-07-18

## 2022-07-25 LAB
ALBUMIN SERPL ELPH-MCNC: 3 G/DL — LOW (ref 3.5–5)
ALP SERPL-CCNC: 58 U/L — SIGNIFICANT CHANGE UP (ref 40–120)
ALT FLD-CCNC: 28 U/L DA — SIGNIFICANT CHANGE UP (ref 10–60)
ANION GAP SERPL CALC-SCNC: 9 MMOL/L — SIGNIFICANT CHANGE UP (ref 5–17)
APTT BLD: 66.4 SEC — HIGH (ref 27.5–35.5)
APTT BLD: 81.6 SEC — HIGH (ref 27.5–35.5)
AST SERPL-CCNC: 15 U/L — SIGNIFICANT CHANGE UP (ref 10–40)
BILIRUB SERPL-MCNC: 0.7 MG/DL — SIGNIFICANT CHANGE UP (ref 0.2–1.2)
BUN SERPL-MCNC: 21 MG/DL — HIGH (ref 7–18)
CALCIUM SERPL-MCNC: 9.1 MG/DL — SIGNIFICANT CHANGE UP (ref 8.4–10.5)
CHLORIDE SERPL-SCNC: 107 MMOL/L — SIGNIFICANT CHANGE UP (ref 96–108)
CO2 SERPL-SCNC: 25 MMOL/L — SIGNIFICANT CHANGE UP (ref 22–31)
CREAT SERPL-MCNC: 1.78 MG/DL — HIGH (ref 0.5–1.3)
EGFR: 37 ML/MIN/1.73M2 — LOW
GLUCOSE BLDC GLUCOMTR-MCNC: 133 MG/DL — HIGH (ref 70–99)
GLUCOSE BLDC GLUCOMTR-MCNC: 165 MG/DL — HIGH (ref 70–99)
GLUCOSE SERPL-MCNC: 146 MG/DL — HIGH (ref 70–99)
HCT VFR BLD CALC: 45.7 % — SIGNIFICANT CHANGE UP (ref 39–50)
HGB BLD-MCNC: 15.3 G/DL — SIGNIFICANT CHANGE UP (ref 13–17)
INR BLD: 1.09 RATIO — SIGNIFICANT CHANGE UP (ref 0.88–1.16)
MAGNESIUM SERPL-MCNC: 2.3 MG/DL — SIGNIFICANT CHANGE UP (ref 1.6–2.6)
MCHC RBC-ENTMCNC: 32.8 PG — SIGNIFICANT CHANGE UP (ref 27–34)
MCHC RBC-ENTMCNC: 33.5 GM/DL — SIGNIFICANT CHANGE UP (ref 32–36)
MCV RBC AUTO: 97.9 FL — SIGNIFICANT CHANGE UP (ref 80–100)
NRBC # BLD: 0 /100 WBCS — SIGNIFICANT CHANGE UP (ref 0–0)
PHOSPHATE SERPL-MCNC: 2.6 MG/DL — SIGNIFICANT CHANGE UP (ref 2.5–4.5)
PLATELET # BLD AUTO: 181 K/UL — SIGNIFICANT CHANGE UP (ref 150–400)
POTASSIUM SERPL-MCNC: 3.8 MMOL/L — SIGNIFICANT CHANGE UP (ref 3.5–5.3)
POTASSIUM SERPL-SCNC: 3.8 MMOL/L — SIGNIFICANT CHANGE UP (ref 3.5–5.3)
PROT SERPL-MCNC: 6.9 G/DL — SIGNIFICANT CHANGE UP (ref 6–8.3)
PROTHROM AB SERPL-ACNC: 13 SEC — SIGNIFICANT CHANGE UP (ref 10.5–13.4)
RBC # BLD: 4.67 M/UL — SIGNIFICANT CHANGE UP (ref 4.2–5.8)
RBC # FLD: 13.3 % — SIGNIFICANT CHANGE UP (ref 10.3–14.5)
SARS-COV-2 RNA SPEC QL NAA+PROBE: SIGNIFICANT CHANGE UP
SODIUM SERPL-SCNC: 141 MMOL/L — SIGNIFICANT CHANGE UP (ref 135–145)
WBC # BLD: 8.93 K/UL — SIGNIFICANT CHANGE UP (ref 3.8–10.5)
WBC # FLD AUTO: 8.93 K/UL — SIGNIFICANT CHANGE UP (ref 3.8–10.5)

## 2022-07-25 PROCEDURE — 84443 ASSAY THYROID STIM HORMONE: CPT

## 2022-07-25 PROCEDURE — 85027 COMPLETE CBC AUTOMATED: CPT

## 2022-07-25 PROCEDURE — 85730 THROMBOPLASTIN TIME PARTIAL: CPT

## 2022-07-25 PROCEDURE — 80061 LIPID PANEL: CPT

## 2022-07-25 PROCEDURE — 84300 ASSAY OF URINE SODIUM: CPT

## 2022-07-25 PROCEDURE — 84484 ASSAY OF TROPONIN QUANT: CPT

## 2022-07-25 PROCEDURE — 85610 PROTHROMBIN TIME: CPT

## 2022-07-25 PROCEDURE — 83935 ASSAY OF URINE OSMOLALITY: CPT

## 2022-07-25 PROCEDURE — 93010 ELECTROCARDIOGRAM REPORT: CPT

## 2022-07-25 PROCEDURE — 83735 ASSAY OF MAGNESIUM: CPT

## 2022-07-25 PROCEDURE — 93454 CORONARY ARTERY ANGIO S&I: CPT | Mod: 26

## 2022-07-25 PROCEDURE — 83036 HEMOGLOBIN GLYCOSYLATED A1C: CPT

## 2022-07-25 PROCEDURE — 99285 EMERGENCY DEPT VISIT HI MDM: CPT

## 2022-07-25 PROCEDURE — 85025 COMPLETE CBC W/AUTO DIFF WBC: CPT

## 2022-07-25 PROCEDURE — 80048 BASIC METABOLIC PNL TOTAL CA: CPT

## 2022-07-25 PROCEDURE — 87635 SARS-COV-2 COVID-19 AMP PRB: CPT

## 2022-07-25 PROCEDURE — 82962 GLUCOSE BLOOD TEST: CPT

## 2022-07-25 PROCEDURE — 81001 URINALYSIS AUTO W/SCOPE: CPT

## 2022-07-25 PROCEDURE — 71045 X-RAY EXAM CHEST 1 VIEW: CPT

## 2022-07-25 PROCEDURE — 82570 ASSAY OF URINE CREATININE: CPT

## 2022-07-25 PROCEDURE — 83880 ASSAY OF NATRIURETIC PEPTIDE: CPT

## 2022-07-25 PROCEDURE — A9502: CPT

## 2022-07-25 PROCEDURE — 84100 ASSAY OF PHOSPHORUS: CPT

## 2022-07-25 PROCEDURE — 94660 CPAP INITIATION&MGMT: CPT

## 2022-07-25 PROCEDURE — 93005 ELECTROCARDIOGRAM TRACING: CPT

## 2022-07-25 PROCEDURE — 80053 COMPREHEN METABOLIC PANEL: CPT

## 2022-07-25 PROCEDURE — 36415 COLL VENOUS BLD VENIPUNCTURE: CPT

## 2022-07-25 PROCEDURE — 78452 HT MUSCLE IMAGE SPECT MULT: CPT

## 2022-07-25 PROCEDURE — 93017 CV STRESS TEST TRACING ONLY: CPT

## 2022-07-25 PROCEDURE — 93306 TTE W/DOPPLER COMPLETE: CPT

## 2022-07-25 RX ORDER — TAMSULOSIN HYDROCHLORIDE 0.4 MG/1
0.4 CAPSULE ORAL AT BEDTIME
Refills: 0 | Status: DISCONTINUED | OUTPATIENT
Start: 2022-07-25 | End: 2022-07-28

## 2022-07-25 RX ORDER — SODIUM CHLORIDE 9 MG/ML
1000 INJECTION, SOLUTION INTRAVENOUS
Refills: 0 | Status: DISCONTINUED | OUTPATIENT
Start: 2022-07-25 | End: 2022-07-28

## 2022-07-25 RX ORDER — DEXTROSE 50 % IN WATER 50 %
25 SYRINGE (ML) INTRAVENOUS ONCE
Refills: 0 | Status: DISCONTINUED | OUTPATIENT
Start: 2022-07-25 | End: 2022-07-28

## 2022-07-25 RX ORDER — GLUCAGON INJECTION, SOLUTION 0.5 MG/.1ML
1 INJECTION, SOLUTION SUBCUTANEOUS ONCE
Refills: 0 | Status: DISCONTINUED | OUTPATIENT
Start: 2022-07-25 | End: 2022-07-28

## 2022-07-25 RX ORDER — ASPIRIN/CALCIUM CARB/MAGNESIUM 324 MG
81 TABLET ORAL DAILY
Refills: 0 | Status: DISCONTINUED | OUTPATIENT
Start: 2022-07-25 | End: 2022-07-27

## 2022-07-25 RX ORDER — DEXTROSE 50 % IN WATER 50 %
12.5 SYRINGE (ML) INTRAVENOUS ONCE
Refills: 0 | Status: DISCONTINUED | OUTPATIENT
Start: 2022-07-25 | End: 2022-07-28

## 2022-07-25 RX ORDER — DEXTROSE 50 % IN WATER 50 %
15 SYRINGE (ML) INTRAVENOUS ONCE
Refills: 0 | Status: DISCONTINUED | OUTPATIENT
Start: 2022-07-25 | End: 2022-07-28

## 2022-07-25 RX ORDER — ISOSORBIDE MONONITRATE 60 MG/1
30 TABLET, EXTENDED RELEASE ORAL DAILY
Refills: 0 | Status: DISCONTINUED | OUTPATIENT
Start: 2022-07-26 | End: 2022-07-28

## 2022-07-25 RX ORDER — CLOPIDOGREL BISULFATE 75 MG/1
600 TABLET, FILM COATED ORAL ONCE
Refills: 0 | Status: COMPLETED | OUTPATIENT
Start: 2022-07-25 | End: 2022-07-25

## 2022-07-25 RX ORDER — AMIODARONE HYDROCHLORIDE 400 MG/1
200 TABLET ORAL DAILY
Refills: 0 | Status: DISCONTINUED | OUTPATIENT
Start: 2022-07-26 | End: 2022-07-28

## 2022-07-25 RX ORDER — HYDRALAZINE HCL 50 MG
50 TABLET ORAL THREE TIMES A DAY
Refills: 0 | Status: DISCONTINUED | OUTPATIENT
Start: 2022-07-25 | End: 2022-07-28

## 2022-07-25 RX ORDER — ATORVASTATIN CALCIUM 80 MG/1
40 TABLET, FILM COATED ORAL AT BEDTIME
Refills: 0 | Status: DISCONTINUED | OUTPATIENT
Start: 2022-07-25 | End: 2022-07-28

## 2022-07-25 RX ORDER — INSULIN LISPRO 100/ML
VIAL (ML) SUBCUTANEOUS
Refills: 0 | Status: DISCONTINUED | OUTPATIENT
Start: 2022-07-25 | End: 2022-07-28

## 2022-07-25 RX ORDER — METOPROLOL TARTRATE 50 MG
12.5 TABLET ORAL
Refills: 0 | Status: DISCONTINUED | OUTPATIENT
Start: 2022-07-25 | End: 2022-07-28

## 2022-07-25 RX ORDER — PANTOPRAZOLE SODIUM 20 MG/1
40 TABLET, DELAYED RELEASE ORAL
Refills: 0 | Status: DISCONTINUED | OUTPATIENT
Start: 2022-07-25 | End: 2022-07-28

## 2022-07-25 RX ORDER — MONTELUKAST 4 MG/1
10 TABLET, CHEWABLE ORAL DAILY
Refills: 0 | Status: DISCONTINUED | OUTPATIENT
Start: 2022-07-25 | End: 2022-07-28

## 2022-07-25 RX ORDER — SODIUM CHLORIDE 9 MG/ML
1000 INJECTION INTRAMUSCULAR; INTRAVENOUS; SUBCUTANEOUS
Refills: 0 | Status: DISCONTINUED | OUTPATIENT
Start: 2022-07-25 | End: 2022-07-28

## 2022-07-25 RX ORDER — BUDESONIDE AND FORMOTEROL FUMARATE DIHYDRATE 160; 4.5 UG/1; UG/1
2 AEROSOL RESPIRATORY (INHALATION)
Refills: 0 | Status: DISCONTINUED | OUTPATIENT
Start: 2022-07-25 | End: 2022-07-28

## 2022-07-25 RX ORDER — ACETYLCYSTEINE 200 MG/ML
1200 VIAL (ML) MISCELLANEOUS EVERY 12 HOURS
Refills: 0 | Status: DISCONTINUED | OUTPATIENT
Start: 2022-07-25 | End: 2022-07-28

## 2022-07-25 RX ORDER — CLOPIDOGREL BISULFATE 75 MG/1
75 TABLET, FILM COATED ORAL DAILY
Refills: 0 | Status: DISCONTINUED | OUTPATIENT
Start: 2022-07-26 | End: 2022-07-28

## 2022-07-25 RX ORDER — LOSARTAN POTASSIUM 100 MG/1
25 TABLET, FILM COATED ORAL DAILY
Refills: 0 | Status: DISCONTINUED | OUTPATIENT
Start: 2022-07-26 | End: 2022-07-28

## 2022-07-25 RX ADMIN — HEPARIN SODIUM 1300 UNIT(S)/HR: 5000 INJECTION INTRAVENOUS; SUBCUTANEOUS at 07:04

## 2022-07-25 RX ADMIN — HEPARIN SODIUM 1300 UNIT(S)/HR: 5000 INJECTION INTRAVENOUS; SUBCUTANEOUS at 14:34

## 2022-07-25 RX ADMIN — Medication 50 MILLIGRAM(S): at 21:47

## 2022-07-25 RX ADMIN — BUDESONIDE AND FORMOTEROL FUMARATE DIHYDRATE 2 PUFF(S): 160; 4.5 AEROSOL RESPIRATORY (INHALATION) at 21:47

## 2022-07-25 RX ADMIN — ATORVASTATIN CALCIUM 40 MILLIGRAM(S): 80 TABLET, FILM COATED ORAL at 21:47

## 2022-07-25 RX ADMIN — PANTOPRAZOLE SODIUM 40 MILLIGRAM(S): 20 TABLET, DELAYED RELEASE ORAL at 07:04

## 2022-07-25 RX ADMIN — Medication 12.5 MILLIGRAM(S): at 07:02

## 2022-07-25 RX ADMIN — CLOPIDOGREL BISULFATE 600 MILLIGRAM(S): 75 TABLET, FILM COATED ORAL at 20:30

## 2022-07-25 RX ADMIN — AMIODARONE HYDROCHLORIDE 200 MILLIGRAM(S): 400 TABLET ORAL at 07:02

## 2022-07-25 RX ADMIN — TAMSULOSIN HYDROCHLORIDE 0.4 MILLIGRAM(S): 0.4 CAPSULE ORAL at 21:47

## 2022-07-25 RX ADMIN — Medication 1200 MILLIGRAM(S): at 07:02

## 2022-07-25 RX ADMIN — Medication 25 MILLIGRAM(S): at 14:33

## 2022-07-25 RX ADMIN — MONTELUKAST 10 MILLIGRAM(S): 4 TABLET, CHEWABLE ORAL at 12:13

## 2022-07-25 RX ADMIN — Medication 1: at 12:13

## 2022-07-25 RX ADMIN — Medication 25 MILLIGRAM(S): at 07:02

## 2022-07-25 RX ADMIN — HEPARIN SODIUM 1300 UNIT(S)/HR: 5000 INJECTION INTRAVENOUS; SUBCUTANEOUS at 07:14

## 2022-07-25 RX ADMIN — ISOSORBIDE MONONITRATE 30 MILLIGRAM(S): 60 TABLET, EXTENDED RELEASE ORAL at 12:13

## 2022-07-25 RX ADMIN — HEPARIN SODIUM 1300 UNIT(S)/HR: 5000 INJECTION INTRAVENOUS; SUBCUTANEOUS at 07:16

## 2022-07-25 RX ADMIN — SODIUM CHLORIDE 75 MILLILITER(S): 9 INJECTION INTRAMUSCULAR; INTRAVENOUS; SUBCUTANEOUS at 19:31

## 2022-07-25 NOTE — PROGRESS NOTE ADULT - PROBLEM SELECTOR PLAN 1
Comes with  4 days of worsening shortness of breath, palpitations, and chest pain  Found with EKG showing RVR 120s  s/p nitro, and felt better, the rate is now controlled  Patient's most recent echo in 2020 with EF 58%  No rate control at home, but on xarelto  BNP in 1200  Trop 331>284 - likely 2/2 demand ischemia  - CXR read - cardiomegaly, no pulmonary effusions  -Diuresis on stand by, Cr. 1.7, patient is in NAD, and no crackles heard, despite mild edema      cardiology Dr. Almeida consutled   fu Echo - Mild MR, TR, MD, Moderate concentric left ventricular hypertrophy  Dx. Parox Afib with RVR-amiodarone loading later decreased to 400 BID, dig .5mg ivx1,xarelto,add lopressor 25mg q8h now q12  advised to hold diuretics due to CRi  Cath today at [facility]  on heparin Gtt  tele to r/o tachy pamela  Hydralazine added to control BP

## 2022-07-25 NOTE — PROGRESS NOTE ADULT - SUBJECTIVE AND OBJECTIVE BOX
Patient is a 87y old  Male who presents with a chief complaint of Chest pain (24 Jul 2022 15:52)    pt seen in tele [ x ], reg med floor [   ], bed [ x ], chair at bedside [   ], a+o x3 [ x ], lethargic [  ],    nad [ x ]        Allergies    No Known Allergies        Vitals    T(F): 98.6 (07-25-22 @ 04:48), Max: 98.6 (07-25-22 @ 04:48)  HR: 76 (07-25-22 @ 04:48) (70 - 83)  BP: 123/67 (07-25-22 @ 04:48) (104/58 - 139/74)  RR: 20 (07-25-22 @ 04:48) (18 - 20)  SpO2: 98% (07-25-22 @ 04:48) (96% - 98%)  Wt(kg): --  CAPILLARY BLOOD GLUCOSE      POCT Blood Glucose.: 142 mg/dL (24 Jul 2022 21:04)      Labs                          15.3   8.93  )-----------( 181      ( 25 Jul 2022 06:02 )             45.7       07-24    139  |  105  |  23<H>  ----------------------------<  136<H>  4.2   |  25  |  1.83<H>    Ca    8.7      24 Jul 2022 05:43  Phos  2.7     07-24  Mg     2.4     07-24    TPro  6.8  /  Alb  3.0<L>  /  TBili  0.8  /  DBili  x   /  AST  20  /  ALT  32  /  AlkPhos  58  07-24                Radiology Results      Meds    MEDICATIONS  (STANDING):  acetylcysteine  Oral Solution 1200 milliGRAM(s) Oral every 12 hours  aMIOdarone    Tablet 200 milliGRAM(s) Oral daily  atorvastatin 10 milliGRAM(s) Oral at bedtime  heparin  Infusion. 900 Unit(s)/Hr (9 mL/Hr) IV Continuous <Continuous>  hydrALAZINE 25 milliGRAM(s) Oral three times a day  insulin lispro (ADMELOG) corrective regimen sliding scale   SubCutaneous three times a day before meals  insulin lispro (ADMELOG) corrective regimen sliding scale   SubCutaneous at bedtime  isosorbide   mononitrate ER Tablet (IMDUR) 30 milliGRAM(s) Oral daily  metoprolol tartrate 12.5 milliGRAM(s) Oral two times a day  montelukast 10 milliGRAM(s) Oral daily  pantoprazole    Tablet 40 milliGRAM(s) Oral before breakfast  sodium chloride 0.9%. 1000 milliLiter(s) (60 mL/Hr) IV Continuous <Continuous>      MEDICATIONS  (PRN):      Physical Exam    Neuro :  no focal deficits  Respiratory: CTA B/L  CV: RRR, S1S2, no murmurs,   Abdominal: Soft, NT, ND +BS,  Extremities: No edema, + peripheral pulses    ASSESSMENT    Neuro :  no focal deficits  Respiratory: CTA B/L  CV: RRR, S1S2, no murmurs,   Abdominal: Soft, NT, ND +BS,  Extremities: No edema, + peripheral pulses    ASSESSMENT    chest pain 2nd to acs    sob,   r/o chf,   uti   amy  h/o COPD   KENRICK on CPAP,   diabetes.   A fib on Xarelto,   RCC s/p right nephrectomy many years ago,   CKD      PLAN    cont tele,   acs protocol,   aspirin, statin,   trop x 3 noted    cardio f/u   echo with Normal left ventricular systolic function, pulm htn noted    completed amiodarone load   cont amio maintenance 200mg daily   cont  lopressor 12.5mg q12h    cont imdur and hydralazine  r/o tachy-pamela synd.   stress test positive noted    pt for cardiac cath in am   ua positive  cont rocephin  to complete 5 days  f/u ucx   monitor serum creat   renal f/u   PT SHOULD  GET  1/2  NS    WITH  AN AMP  OF  BICARB    ,  AND  MUCOMYST  12  HRS  PRE  AND  POST   PROCEDURE  PT HAS  THE  RISKS OF  DECLINE IN  RENAL  FUNCTION   pulm f/u   Sleep study  PFTs as OP  oxygen supp  CPAP at night  Bronchodilators  Symbicort /4.5 mcgs 2 puffs po BID.hgba1c 6.4 noted above  lispro ss,   cont current meds       Patient is a 87y old  Male who presents with a chief complaint of Chest pain (24 Jul 2022 15:52)    pt seen in tele [ x ], reg med floor [   ], bed [ x ], chair at bedside [   ], a+o x3 [ x ], lethargic [  ],    nad [ x ]        Allergies    No Known Allergies        Vitals    T(F): 98.6 (07-25-22 @ 04:48), Max: 98.6 (07-25-22 @ 04:48)  HR: 76 (07-25-22 @ 04:48) (70 - 83)  BP: 123/67 (07-25-22 @ 04:48) (104/58 - 139/74)  RR: 20 (07-25-22 @ 04:48) (18 - 20)  SpO2: 98% (07-25-22 @ 04:48) (96% - 98%)  Wt(kg): --  CAPILLARY BLOOD GLUCOSE      POCT Blood Glucose.: 142 mg/dL (24 Jul 2022 21:04)      Labs                          15.3   8.93  )-----------( 181      ( 25 Jul 2022 06:02 )             45.7       07-24    139  |  105  |  23<H>  ----------------------------<  136<H>  4.2   |  25  |  1.83<H>    Ca    8.7      24 Jul 2022 05:43  Phos  2.7     07-24  Mg     2.4     07-24    TPro  6.8  /  Alb  3.0<L>  /  TBili  0.8  /  DBili  x   /  AST  20  /  ALT  32  /  AlkPhos  58  07-24                Radiology Results      Meds    MEDICATIONS  (STANDING):  acetylcysteine  Oral Solution 1200 milliGRAM(s) Oral every 12 hours  aMIOdarone    Tablet 200 milliGRAM(s) Oral daily  atorvastatin 10 milliGRAM(s) Oral at bedtime  heparin  Infusion. 900 Unit(s)/Hr (9 mL/Hr) IV Continuous <Continuous>  hydrALAZINE 25 milliGRAM(s) Oral three times a day  insulin lispro (ADMELOG) corrective regimen sliding scale   SubCutaneous three times a day before meals  insulin lispro (ADMELOG) corrective regimen sliding scale   SubCutaneous at bedtime  isosorbide   mononitrate ER Tablet (IMDUR) 30 milliGRAM(s) Oral daily  metoprolol tartrate 12.5 milliGRAM(s) Oral two times a day  montelukast 10 milliGRAM(s) Oral daily  pantoprazole    Tablet 40 milliGRAM(s) Oral before breakfast  sodium chloride 0.9%. 1000 milliLiter(s) (60 mL/Hr) IV Continuous <Continuous>      MEDICATIONS  (PRN):      Physical Exam    Neuro :  no focal deficits  Respiratory: CTA B/L  CV: RRR, S1S2, no murmurs,   Abdominal: Soft, NT, ND +BS,  Extremities: No edema, + peripheral pulses    ASSESSMENT    Neuro :  no focal deficits  Respiratory: CTA B/L  CV: RRR, S1S2, no murmurs,   Abdominal: Soft, NT, ND +BS,  Extremities: No edema, + peripheral pulses    ASSESSMENT    chest pain 2nd to acs    sob,   r/o chf,   uti   amy  h/o COPD   KENRICK on CPAP,   diabetes.   A fib on Xarelto,   RCC s/p right nephrectomy many years ago,   CKD      PLAN    cont tele,   acs protocol,   aspirin, statin,   trop x 3 noted    cardio f/u   echo with Normal left ventricular systolic function, pulm htn noted    completed amiodarone load   cont amio maintenance 200mg daily   cont  lopressor 12.5mg q12h    cont imdur and hydralazine  r/o tachy-pamela synd.   stress test positive noted    pt for cardiac cath in am   ua positive  completed course of rocephin   f/u ucx   monitor serum creat   renal f/u   PT SHOULD  GET  1/2  NS    WITH  AN AMP  OF  BICARB    ,  AND  MUCOMYST  12  HRS  PRE  AND  POST   PROCEDURE  PT HAS  THE  RISKS OF  DECLINE IN  RENAL  FUNCTION   pulm f/u   Sleep study  PFTs as OP  oxygen supp  CPAP at night  Bronchodilators  Symbicort /4.5 mcgs 2 puffs po BID.hgba1c 6.4 noted above  lispro ss,   cont current meds   pt for transfer to [facility] for card cath today

## 2022-07-25 NOTE — TRANSFER ACCEPTANCE NOTE - NSICDXPASTMEDICALHX_GEN_ALL_CORE_FT
PAST MEDICAL HISTORY:  Chronic atrial fibrillation on Xarelto    CKD (chronic kidney disease)     DM2 (diabetes mellitus, type 2)     H/O: HTN (hypertension)     HLD (hyperlipidemia)     KENRICK and COPD overlap syndrome     Renal cell cancer s/p R nephrectomy 7 years ago     PAST MEDICAL HISTORY:  BPH (benign prostatic hyperplasia)     Chronic atrial fibrillation on Xarelto    CKD (chronic kidney disease)     DM2 (diabetes mellitus, type 2)     H/O: HTN (hypertension)     HLD (hyperlipidemia)     KENRICK and COPD overlap syndrome     Renal cell cancer s/p R nephrectomy 7 years ago

## 2022-07-25 NOTE — PROGRESS NOTE ADULT - PROBLEM SELECTOR PROBLEM 1
KENRICK and COPD overlap syndrome
Atrial fibrillation with rapid ventricular response
KENRICK and COPD overlap syndrome
Atrial fibrillation with rapid ventricular response
Atrial fibrillation with rapid ventricular response
KENRICK and COPD overlap syndrome
Atrial fibrillation with rapid ventricular response
Atrial fibrillation with rapid ventricular response

## 2022-07-25 NOTE — PATIENT PROFILE ADULT - FALL HARM RISK - HARM RISK INTERVENTIONS
Assistance with ambulation/Assistance OOB with selected safe patient handling equipment/Communicate Risk of Fall with Harm to all staff/Monitor gait and stability/Reinforce activity limits and safety measures with patient and family/Tailored Fall Risk Interventions/Visual Cue: Yellow wristband and red socks/Bed in lowest position, wheels locked, appropriate side rails in place/Call bell, personal items and telephone in reach/Instruct patient to call for assistance before getting out of bed or chair/Non-slip footwear when patient is out of bed/[place] to call system/Physically safe environment - no spills, clutter or unnecessary equipment/Purposeful Proactive Rounding/Room/bathroom lighting operational, light cord in reach

## 2022-07-25 NOTE — PROGRESS NOTE ADULT - PROVIDER SPECIALTY LIST ADULT
Cardiology
Cardiology
Internal Medicine
Cardiology
Internal Medicine
Internal Medicine
Pulmonology
Cardiology
Nephrology
Nephrology
Pulmonology
Pulmonology
Internal Medicine
Internal Medicine

## 2022-07-25 NOTE — TRANSFER ACCEPTANCE NOTE - HISTORY OF PRESENT ILLNESS
86 y.o. Maori speaking male with PMH of A. Fib (on Xarelto), DM II, COPD, KENRICK on CPAP, RCC s/p right nephrectomy many years ago, CKD – was admitted to [facility] on 7/18/22 c/o shortness of breath with exertion getting progressively worse. The patient admits to pressure like, midsternal chest discomfort, started 4 days ago. The patient was evaluated by his PCP, was noted to have A. Fib with RVR, and recommended for the patient to go to ER.   Upon arrival to [facility] RE, the patient was found to have:  - Elevated Troponin - started on Heparin drip.   - A. Fib with RVR - completed Amiodarone load; presently is on Amiodarone 200 mg PO daily, Lopressor 25 mg PO BID.  - HTN- Hydralazine added  - CKD Mucomyst and hydration recommended pre cath  - UTI- completed course of Ceftriaxone x 5 days.     CXR 7/18/22 Cardiomegaly. No large airspace consolidation or effusion.  Echo 7/19/22   1. Normal mitral valve. Mild mitral regurgitation.  2. Normal trileaflet aortic valve.  3. Aortic Root: 4.4 cm.  4. Normal left atrium.  LA volume index = 25 cc/m2.  5. Moderate concentric left ventricular hypertrophy.  6. Normal left ventricular systolic function.  7. Unable to adequately assess diastolic function due to  technical aspects of this study.  8. Normal right atrium.  9. Normal right ventricular size and systolic function  (TAPSE 2.3 cm).  10. RV systolic pressure is moderately increased at 48 mm  Hg.  11. There is mild tricuspid regurgitation.  12. There is mild pulmonic regurgitation.  13. Normal pericardium with no pericardial effusion.    Stress test 7/22/22 There are small, severe defects in basal and mid  inferior, basal infero-lateral walls that are reversible  consistent with  ischemia.  * The inferior and infero-lateral walls are hypokinesis  EF=45%.    The patient was transferred to [facility] for cardiac catheterization.        86 y.o. Korean speaking male with PMH of A. Fib (on Xarelto), DM II, COPD, KENRICK on CPAP, RCC s/p right nephrectomy many years ago, CKD – was admitted to [facility] on 7/18/22 c/o shortness of breath with exertion getting progressively worse. The patient admits to pressure like, midsternal chest discomfort, started 4 days ago. The patient was evaluated by his PCP, was noted to have A. Fib with RVR, and recommended for the patient to go to ER.   Upon arrival to [facility] RE, the patient was found to have:  - Elevated Troponin - started on Heparin drip.   - A. Fib with RVR - completed Amiodarone load; presently is on Amiodarone 200 mg PO daily, Lopressor 25 mg PO BID.  - UTI- completed course of Ceftriaxone x 5 days.     CXR 7/18/22 Cardiomegaly. No large airspace consolidation or effusion.  Echo 7/19/22   1. Normal mitral valve. Mild mitral regurgitation.  2. Normal trileaflet aortic valve.  3. Aortic Root: 4.4 cm.  4. Normal left atrium.  LA volume index = 25 cc/m2.  5. Moderate concentric left ventricular hypertrophy.  6. Normal left ventricular systolic function.  7. Unable to adequately assess diastolic function due to  technical aspects of this study.  8. Normal right atrium.  9. Normal right ventricular size and systolic function  (TAPSE 2.3 cm).  10. RV systolic pressure is moderately increased at 48 mm  Hg.  11. There is mild tricuspid regurgitation.  12. There is mild pulmonic regurgitation.  13. Normal pericardium with no pericardial effusion.    Stress test 7/22/22 There are small, severe defects in basal and mid  inferior, basal infero-lateral walls that are reversible  consistent with  ischemia.  * The inferior and infero-lateral walls are hypokinesis  EF=45%.    The patient was transferred to [facility] for cardiac catheterization.        86 y.o. Somali speaking male with PMH of A. Fib (on Xarelto), DM II, COPD, KENRICK on CPAP, RCC s/p right nephrectomy many years ago, CKD – was admitted to [facility] on 7/18/22 c/o shortness of breath with exertion getting progressively worse. The patient admits to pressure like, midsternal chest discomfort, started 4 days ago. He admits to occasional  palpitations. The patient denies dizziness, presyncope, syncope,  headache, visual disturbances, CVA, PE, DVT, KENRICK, abdominal pain, N/V/D/C, hematochezia, melena, dysuria, hematuria, fever, chills.  The patient was evaluated by his PCP, was noted to have A. Fib with RVR, and recommended for the patient to go to ER.   Upon arrival to [facility] RE, the patient was found to have:  - Elevated Troponin - started on Heparin drip.   - A. Fib with RVR - completed Amiodarone load; presently is on Amiodarone 200 mg PO daily, Lopressor 25 mg PO BID.  - UTI- completed course of Ceftriaxone x 5 days.     CXR 7/18/22 Cardiomegaly. No large airspace consolidation or effusion.  Echo 7/19/22   1. Normal mitral valve. Mild mitral regurgitation.  2. Normal trileaflet aortic valve.  3. Aortic Root: 4.4 cm.  4. Normal left atrium.  LA volume index = 25 cc/m2.  5. Moderate concentric left ventricular hypertrophy.  6. Normal left ventricular systolic function.  7. Unable to adequately assess diastolic function due to  technical aspects of this study.  8. Normal right atrium.  9. Normal right ventricular size and systolic function  (TAPSE 2.3 cm).  10. RV systolic pressure is moderately increased at 48 mm  Hg.  11. There is mild tricuspid regurgitation.  12. There is mild pulmonic regurgitation.  13. Normal pericardium with no pericardial effusion.    Stress test 7/22/22 There are small, severe defects in basal and mid  inferior, basal infero-lateral walls that are reversible  consistent with  ischemia.  * The inferior and infero-lateral walls are hypokinesis  EF=45%.    The patient was transferred to [facility] for cardiac catheterization.        86 y.o. Chadian speaking male with PMH of A. Fib (on Xarelto), DM II, COPD, KENRICK on CPAP, RCC s/p right nephrectomy, CKD – was admitted to [facility] on 7/18/22 c/o shortness of breath with exertion getting progressively worse. The patient admits to pressure like, midsternal chest discomfort, started 4 days ago. He admits to occasional  palpitations. The patient denies dizziness, presyncope, syncope,  headache, visual disturbances, CVA, PE, DVT, KENRICK, abdominal pain, N/V/D/C, hematochezia, melena, dysuria, hematuria, fever, chills.  The patient was evaluated by his PCP, was noted to have A. Fib with RVR, and recommended for the patient to go to ER.   Upon arrival to [facility] ER, the patient was found to have:  - Elevated Troponin - started on Heparin drip.   - A. Fib with RVR - completed Amiodarone load; presently is on Amiodarone 200 mg PO daily, Lopressor 25 mg PO BID.  - UTI- completed course of Ceftriaxone x 5 days.     CXR 7/18/22 Cardiomegaly. No large airspace consolidation or effusion.  Echo 7/19/22   1. Normal mitral valve. Mild mitral regurgitation.  2. Normal trileaflet aortic valve.  3. Aortic Root: 4.4 cm.  4. Normal left atrium.  LA volume index = 25 cc/m2.  5. Moderate concentric left ventricular hypertrophy.  6. Normal left ventricular systolic function.  7. Unable to adequately assess diastolic function due to  technical aspects of this study.  8. Normal right atrium.  9. Normal right ventricular size and systolic function  (TAPSE 2.3 cm).  10. RV systolic pressure is moderately increased at 48 mm  Hg.  11. There is mild tricuspid regurgitation.  12. There is mild pulmonic regurgitation.  13. Normal pericardium with no pericardial effusion.    Stress test 7/22/22 There are small, severe defects in basal and mid  inferior, basal infero-lateral walls that are reversible  consistent with  ischemia.  * The inferior and infero-lateral walls are hypokinesis  EF=45%.    The patient was transferred to [facility] for cardiac catheterization.

## 2022-07-25 NOTE — PROGRESS NOTE ADULT - PROBLEM SELECTOR PROBLEM 4
H/O: HTN (hypertension)
H/O: HTN (hypertension)
KENRICK and COPD overlap syndrome
H/O: HTN (hypertension)
KENRICK and COPD overlap syndrome
Diabetes
KENRICK and COPD overlap syndrome

## 2022-07-25 NOTE — ACUTE INTERFACILITY TRANSFER NOTE - PLAN OF CARE
Comes with  4 days of worsening shortness of breath, palpitations, and chest pain  Found with EKG showing RVR 120s  s/p nitro, and felt better, the rate is now controlled  Patient's most recent echo in 2020 with EF 58%  No rate control at home, but on xarelto  BNP in 1200  Trop 331>284 - likely 2/2 demand ischemia  - CXR read - cardiomegaly, no pulmonary effusions  -Diuresis on stand by, Cr. 1.7, patient is in NAD, and no crackles heard, despite mild edema      cardiology Dr. Almeida consutled   fu Echo - Mild MR, TR, ND, Moderate concentric left ventricular hypertrophy  Dx. Parox Afib with RVR-amiodarone loading later decreased to 400 BID, dig .5mg ivx1,xarelto,add lopressor 25mg q8h now q12  advised to hold diuretics due to CRi  for cath on Monday  on heparin Gtt  tele to r/o tachy pamela  Hydralazine added to control BP.

## 2022-07-25 NOTE — CHART NOTE - NSCHARTNOTEFT_GEN_A_CORE
86 y.o. Greenlandic speaking male with PMH of YINA Waters (on Xarelto), DM II, COPD, KENRICK on CPAP, RCC s/p right nephrectomy, CKD – was admitted to [facility] on 7/18/22 c/o shortness of breath with exertion getting progressively worse. Now s/p diagnostic cath showing TVD.  Right radial band removed without incident.  Hemostasis achieved after 15 minutes of manual compression. No hematoma or bleeding observed. Ulnar and radial pulses intact. Capillary refil <3sec to distal digits.  Post 1 hour evaluation shows continued hemostasis, no hematoma, [place] [id number]

## 2022-07-25 NOTE — PROGRESS NOTE ADULT - SUBJECTIVE AND OBJECTIVE BOX
[facility] : Progress Note :: [phone], (office [phone]),   Dr Lund / Dr Coronel / Dr Plunkett / Dr Rashid / Dr Isabel SAWYER / Dr Espinoza / Dr Bhat / Dr Bear anne  _____________________________________________________________________________________________  no complains   awaits transfer for cardiac angiogram    No Known Allergies    Hospital Medications:   MEDICATIONS  (STANDING):  acetylcysteine  Oral Solution 1200 milliGRAM(s) Oral every 12 hours  aMIOdarone    Tablet 200 milliGRAM(s) Oral daily  atorvastatin 10 milliGRAM(s) Oral at bedtime  heparin  Infusion. 900 Unit(s)/Hr (9 mL/Hr) IV Continuous <Continuous>  hydrALAZINE 25 milliGRAM(s) Oral three times a day  insulin lispro (ADMELOG) corrective regimen sliding scale   SubCutaneous three times a day before meals  insulin lispro (ADMELOG) corrective regimen sliding scale   SubCutaneous at bedtime  isosorbide   mononitrate ER Tablet (IMDUR) 30 milliGRAM(s) Oral daily  metoprolol tartrate 12.5 milliGRAM(s) Oral two times a day  montelukast 10 milliGRAM(s) Oral daily  pantoprazole    Tablet 40 milliGRAM(s) Oral before breakfast  sodium chloride 0.9%. 1000 milliLiter(s) (60 mL/Hr) IV Continuous <Continuous>        VITALS:  T(F): 98 (22 @ 11:11), Max: 98.6 (22 @ 04:48)  HR: 84 (22 @ 11:11)  BP: 129/74 (22 @ 11:11)  RR: 18 (22 @ 11:11)  SpO2: 98% (22 @ 11:11)  Wt(kg): --     @ 07:01  -   @ 15:11  --------------------------------------------------------  IN: 430 mL / OUT: 0 mL / NET: 430 mL        PHYSICAL EXAM:  Constitutional: NAD  HEENT: anicteric sclera, oropharynx clear.  Neck: No JVD  Respiratory: CTAB, no wheezes, rales or rhonchi  Cardiovascular: S1, S2, RRR  Gastrointestinal: BS+, soft, NT/ND  Extremities: No peripheral edema  Neurological: A/O x 3, no focal deficits  : No CVA tenderness. No carlin.   Skin: No rashes    LABS:      141  |  107  |  21<H>  ----------------------------<  146<H>  3.8   |  25  |  1.78<H>    Ca    9.1      2022 06:02  Phos  2.6       Mg     2.3         TPro  6.9  /  Alb  3.0<L>  /  TBili  0.7  /  DBili      /  AST  15  /  ALT  28  /  AlkPhos  58      Creatinine Trend: 1.78 <--, 1.83 <--, 1.69 <--, 1.81 <--, 2.04 <--, 1.92 <--, 1.66 <--                        15.3   8.93  )-----------( 181      ( 2022 06:02 )             45.7     Urine Studies:  Urinalysis Basic - ( 2022 21:40 )    Color: Yellow / Appearance: Clear / S.020 / pH:   Gluc:  / Ketone: Negative  / Bili: Negative / Urobili: Negative   Blood:  / Protein: 100 / Nitrite: Negative   Leuk Esterase: Moderate / RBC: 0-2 /HPF / WBC 6-10 /HPF   Sq Epi:  / Non Sq Epi: Few /HPF / Bacteria: Few /HPF      Osmolality, Random Urine: 630 mos/kg ( @ 21:40)  Sodium, Random Urine: 19 mmol/L ( @ 21:40)  Creatinine, Random Urine: 174 mg/dL ( @ 21:40)    RADIOLOGY & ADDITIONAL STUDIES:

## 2022-07-25 NOTE — PROGRESS NOTE ADULT - ASSESSMENT
85 yo PMH of COPD and KENRICK on CPAP, diabetes,Parox  A fib on Xarelto, RCC s/p right nephrectomy many years ago, CKD, Obese male who lives at home with wife and son, ambulates at baseline, comes in to the ED, due to gradual onset, worsening, intermittent shortness of breath, worse with exertion, associated with pressure like, mid, upper sternal chest discomfort,Afib with RVR.  1.Tele monitoring-R/O tachy-Erasmo.  2.Parox Afib with RVR-amiodarone loading 400mg bid,,xarelto, lopressor 12.5mg q12h.  3.COPD,KENRICK-CPA,Pulm f/u.  4.DM-Insulin.  5.CRI-f/u lytes.  6.Lipid d/o-statin.  7.PPI.  8.Stress test -r/o ischemia.  9.HTN-add hydralazine 25mg tid.
 85 yo PMH of COPD and KENRICK on CPAP, diabetes,Parox  A fib on Xarelto, RCC s/p right nephrectomy many years ago, CKD, Obese male who lives at home with wife and son, ambulates at baseline, comes in to the ED, due to gradual onset, worsening, intermittent shortness of breath, worse with exertion, associated with pressure like, mid, upper sternal chest discomfort,Afib with RVR.  1.Tele monitoring-R/O tachy-Erasmo.  2.Parox Afib with RVR-amiodarone loading 400mg bid,,xarelto, lopressor 12.5mg q12h.  3.COPD,KENRICK-CPA,Pulm f/u.  4.DM-Insulin.  5.CRI-f/u lytes.  6.Lipid d/o-statin.  7.PPI.  8.Stress test in am.
 87 yo PMH of COPD and KENRICK on CPAP, diabetes,Parox  A fib on Xarelto, RCC s/p right nephrectomy many years ago, CKD, Obese male who lives at home with wife and son, ambulates at baseline, comes in to the ED, due to gradual onset, worsening, intermittent shortness of breath, worse with exertion, associated with pressure like, mid, upper sternal chest discomfort,Afib with RVR,+ stress test.  1.Tele monitoring-R/O tachy-Erasmo.  2.Parox Afib with RVR-amiodarone 200mg Qd,heparin drip, lopressor 12.5mg q12h.  3.COPD,KENRICK-CPA,Pulm f/u.  4.DM-Insulin.  5.CRI-f/u lytes.  6.Lipid d/o-statin.  7.PPI.  8.Stress test -+ ischemia. Plan for cardiac cath today,, elevated CR Renal f/u noted,IVF and mucomyst.  9.HTN and LV dysfunction-Imdur and hydralazine.No ACE/ARB elevated cr and one kidney.
A/P  PT  WITH  ELEVATED CR   CR  HAS  BEEN  AROUND  1.6-2.0  SINCE  ADMISSION, HAS  SINGLE  FUNCTIONING  KIDNEY-  L    IS 1.8  THIS  AM    K AND  BICARB OK     BP  IS WELL  CONTROLLED      IS  BEING  TRANSFERRED  TO   [facility]   TOMORROW  FOR  C  CATH     RECOMMENDATIONS  FOR  CONTRAST  NEPHROPATHY  AS  PER  YESTERDAY"S  SUGGESTION    WILL  REQUEST MY  TEAM  TO FOLLOW HIM   THERE AS WELL
Patient is an 87 yo PMH of COPD and KENRICK on CPAP, diabetes. A fib on Xarelto, RCC s/p right nephrectomy many years ago, CKD, Obese male who lives at home with wife and son, ambulates at baseline, comes in to the ED, due to gradual onset, worsening, intermittent shortness of breath, worse with exertion, associated with pressure like, mid, upper sternal chest discomfort, over the last 4 days, referred to ED by OP provider, s/p aspirin and nitroglycerin which provided improvement in symptoms in the ED, found to have EKG showing A -fib RVR,     Labs: WBC 11, Cr. 1.7 BNP 1200s, trop 15    Hence patient was admitted for A-fib RVR.
 85 yo PMH of COPD and KENRICK on CPAP, diabetes,Parox  A fib on Xarelto, RCC s/p right nephrectomy many years ago, CKD, Obese male who lives at home with wife and son, ambulates at baseline, comes in to the ED, due to gradual onset, worsening, intermittent shortness of breath, worse with exertion, associated with pressure like, mid, upper sternal chest discomfort,Afib with RVR,+ stress tesy.  1.Tele monitoring-R/O tachy-Erasmo.  2.Parox Afib with RVR-amiodarone 200mg Qd,,xarelto, lopressor 12.5mg q12h.  3.COPD,KENRICK-CPA,Pulm f/u.  4.DM-Insulin.  5.CRI-f/u lytes.  6.Lipid d/o-statin.  7.PPI.  8.Stress test -+ ischemia.Will need cardiac cath, elevated CR Renal eval called.  9.HTN and LV dysfunction-Imdur and hydralazine.
A/P  # PT  WITH  ELEVATED CR  . LIKELY WITH CKD. SCR  HAS  BEEN  STABLE AT 1.78      BP  IS WELL  CONTROLLED    AWAITS TRANSFER TO [facility] FOR SCHEDULED CARDIAC CATH     RECOMMENDATIONS  FOR  CONTRAST  NEPHROPATHY  PREVENTION  1/2 NS @75CC/HR 12 HRS PRE-CATH.  MUCOMYST 1200MG BID 2 DOSES PRE-CONTRAST AND 2 DOSES POST CONTRAST    WILL F/U WITH PT POST-CATH 
 87 yo PMH of COPD and KENRICK on CPAP, diabetes,Parox  A fib on Xarelto, RCC s/p right nephrectomy many years ago, CKD, Obese male who lives at home with wife and son, ambulates at baseline, comes in to the ED, due to gradual onset, worsening, intermittent shortness of breath, worse with exertion, associated with pressure like, mid, upper sternal chest discomfort,Afib with RVR,+ stress test.  1.Tele monitoring-R/O tachy-Erasmo.  2.Parox Afib with RVR-amiodarone 200mg Qd,d/c xarelto and start heparin drip, lopressor 12.5mg q12h.  3.COPD,KENRICK-CPA,Pulm f/u.  4.DM-Insulin.  5.CRI-f/u lytes.  6.Lipid d/o-statin.  7.PPI.  8.Stress test -+ ischemia. Plan for cardiac cath in am, elevated CR Renal eval noted,IVF and mucomyst.  9.HTN and LV dysfunction-Imdur and hydralazine.
 87 yo PMH of COPD and KENRICK on CPAP, diabetes,Parox  A fib on Xarelto, RCC s/p right nephrectomy many years ago, CKD, Obese male who lives at home with wife and son, ambulates at baseline, comes in to the ED, due to gradual onset, worsening, intermittent shortness of breath, worse with exertion, associated with pressure like, mid, upper sternal chest discomfort,Afib with RVR.  1.Tele monitoring-R/O tachy-Erasmo.  2.Parox Afib with RVR-amiodarone loading dec to 400mg bid,,xarelto,dec  lopressor 12.5mg q12h.  3.COPD,KENRICK-CPA,Pulm f/u.  4.DM-Insulin.  5.CRI-f/u lytes.  6.Lipid d/o-statin.  7.PPI.
Patient is an 87 yo PMH of COPD and KENRICK on CPAP, diabetes. A fib on Xarelto, RCC s/p right nephrectomy many years ago, CKD, Obese male who lives at home with wife and son, ambulates at baseline, comes in to the ED, due to gradual onset, worsening, intermittent shortness of breath, worse with exertion, associated with pressure like, mid, upper sternal chest discomfort, over the last 4 days, referred to ED by OP provider, s/p aspirin and nitroglycerin which provided improvement in symptoms in the ED, found to have EKG showing A -fib RVR,     Labs: WBC 11, Cr. 1.7 BNP 1200s, trop 15    Hence patient was admitted for A-fib RVR.
Patient is an 85 yo PMH of COPD and KENRICK on CPAP, diabetes. A fib on Xarelto, RCC s/p right nephrectomy many years ago, CKD, Obese male who lives at home with wife and son, ambulates at baseline, comes in to the ED, due to gradual onset, worsening, intermittent shortness of breath, worse with exertion, associated with pressure like, mid, upper sternal chest discomfort, over the last 4 days, referred to ED by OP provider, s/p aspirin and nitroglycerin which provided improvement in symptoms in the ED, found to have EKG showing A -fib RVR,     Labs: WBC 11, Cr. 1.7 BNP 1200s, trop 15    Hence patient was admitted for A-fib RVR.
Patient is an 85 yo PMH of COPD and KENRICK on CPAP, diabetes. A fib on Xarelto, RCC s/p right nephrectomy many years ago, CKD, Obese male who lives at home with wife and son, ambulates at baseline, comes in to the ED, due to gradual onset, worsening, intermittent shortness of breath, worse with exertion, associated with pressure like, mid, upper sternal chest discomfort, over the last 4 days, referred to ED by OP provider, s/p aspirin and nitroglycerin which provided improvement in symptoms in the ED, found to have EKG showing A -fib RVR,     Labs: WBC 11, Cr. 1.7 BNP 1200s, trop 15    Hence patient was admitted for A-fib RVR.
Patient is an 87 yo PMH of COPD and KENRICK on CPAP, diabetes. A fib on Xarelto, RCC s/p right nephrectomy many years ago, CKD, Obese male who lives at home with wife and son, ambulates at baseline, comes in to the ED, due to gradual onset, worsening, intermittent shortness of breath, worse with exertion, associated with pressure like, mid, upper sternal chest discomfort, over the last 4 days, referred to ED by OP provider, s/p aspirin and nitroglycerin which provided improvement in symptoms in the ED, found to have EKG showing A -fib RVR,     Labs: WBC 11, Cr. 1.7 BNP 1200s, trop 15    Hence patient was admitted for A-fib RVR.
Patient is an 87 yo PMH of COPD and KENRICK on CPAP, diabetes. A fib on Xarelto, RCC s/p right nephrectomy many years ago, CKD, Obese male who lives at home with wife and son, ambulates at baseline, comes in to the ED, due to gradual onset, worsening, intermittent shortness of breath, worse with exertion, associated with pressure like, mid, upper sternal chest discomfort, over the last 4 days, referred to ED by OP provider, s/p aspirin and nitroglycerin which provided improvement in symptoms in the ED, found to have EKG showing A -fib RVR,     Labs: WBC 11, Cr. 1.7 BNP 1200s, trop 15    Hence patient was admitted for A-fib RVR.
Patient is an 85 yo PMH of COPD and KENRICK on CPAP, diabetes. A fib on Xarelto, RCC s/p right nephrectomy many years ago, CKD, Obese male who lives at home with wife and son, ambulates at baseline, comes in to the ED, due to gradual onset, worsening, intermittent shortness of breath, worse with exertion, associated with pressure like, mid, upper sternal chest discomfort, over the last 4 days, referred to ED by OP provider, s/p aspirin and nitroglycerin which provided improvement in symptoms in the ED, found to have EKG showing A -fib RVR,     Labs: WBC 11, Cr. 1.7 BNP 1200s, trop 15    Hence patient was admitted for A-fib RVR.

## 2022-07-25 NOTE — PROGRESS NOTE ADULT - PROBLEM SELECTOR PLAN 5
Patient is anticoagulated already
Patient is anticoagulated already
Patient is on hydralazine at home  s/p nitro, BP 140s in the ED  cw hydralizine  cw amlodipine
Long standing DM hx, now on Farxiga and Januvia  Finger sticks 130s  ISS
Patient is anticoagulated already
Long standing DM hx, now on Farxiga and Januvia  Finger sticks 130s  ISS
Long standing DM hx, now on Farxiga and Januvia  Finger sticks 130s  ISS

## 2022-07-25 NOTE — PROGRESS NOTE ADULT - PROBLEM SELECTOR PLAN 2
sCr - 1.78  urine sodium - 19  urine osmolality - 630  urine creatinine - 174  FENA <.1% = prerenal GREGORY  cw fluids  Unknown base line

## 2022-07-25 NOTE — PROGRESS NOTE ADULT - PROBLEM SELECTOR PROBLEM 3
Diabetes
KENRICK and COPD overlap syndrome
Acute UTI

## 2022-07-25 NOTE — PROGRESS NOTE ADULT - PROBLEM SELECTOR PLAN 3
UA (+) LE, WBC, few bacteria, 1000 glucose  Start Ceftriaxone for 4 days
UA (+) LE, WBC, few bacteria, 1000 glucose  Start Ceftriaxone EOT 7/24
Accucheck with reg insulin coverage  HbA1c  Endo Eval
UA (+) LE, WBC, few bacteria, 1000 glucose  Start Ceftriaxone for 4 days
UA (+) LE, WBC, few bacteria, 1000 glucose  Start Ceftriaxone for 4 days
Patient on ICS at home as well CPAP  f/u recs for CPAP night settings  Albuterol PRN  Symbicort /4.5 mcgs 2 puffs po BID  - PFTs and sleep study outpatient  Dr. Monique following
UA (+) LE, WBC, few bacteria, 1000 glucose  Start Ceftriaxone EOT 7/24
UA (+) LE, WBC, few bacteria, 1000 glucose  sp Ceftriaxone 5days

## 2022-07-25 NOTE — PROGRESS NOTE ADULT - PROBLEM SELECTOR PLAN 4
BP monitor  Cont meds
BP monitor  Cont meds
Patient on ICS at home as well CPAP  f/u recs for CPAP night settings  Albuterol PRN  Symbicort /4.5 mcgs 2 puffs po BID  - PFTs and sleep study outpatient  Dr. Monique following
Long standing DM hx, now on farxiga and januvia  Finger sticks 160s  ISS
Patient on ICS at home as well CPAP  f/u recs for CPAP night settings  Albuterol PRN  Symbicort /4.5 mcgs 2 puffs po BID  - PFTs and sleep study outpatient  Dr. Monique following
BP monitor  Cont meds
Patient on ICS at home as well CPAP  f/u recs for CPAP night settings  Albuterol PRN  Symbicort /4.5 mcgs 2 puffs po BID  - PFTs and sleep study outpatient  Dr. Monique following

## 2022-07-25 NOTE — PROGRESS NOTE ADULT - SUBJECTIVE AND OBJECTIVE BOX
PGY-1 Progress Note discussed with attending    PAGER #: [[phone]] TILL 5:00 PM  PLEASE CONTACT ON CALL TEAM:  - On Call Team (Please refer to Sri) FROM 5:00 PM - 8:30PM  - Nightfloat Team FROM 8:30 -7:30 AM    INTERVAL HPI/OVERNIGHT EVENTS:   Patient seen and examined at bedside. No acute events overnight. No complaints today, patient to go for Cardiac cath at [facility].    MEDICATIONS  (STANDING):  acetylcysteine  Oral Solution 1200 milliGRAM(s) Oral every 12 hours  aMIOdarone    Tablet 200 milliGRAM(s) Oral daily  atorvastatin 10 milliGRAM(s) Oral at bedtime  heparin  Infusion. 900 Unit(s)/Hr (9 mL/Hr) IV Continuous <Continuous>  hydrALAZINE 25 milliGRAM(s) Oral three times a day  insulin lispro (ADMELOG) corrective regimen sliding scale   SubCutaneous three times a day before meals  insulin lispro (ADMELOG) corrective regimen sliding scale   SubCutaneous at bedtime  isosorbide   mononitrate ER Tablet (IMDUR) 30 milliGRAM(s) Oral daily  metoprolol tartrate 12.5 milliGRAM(s) Oral two times a day  montelukast 10 milliGRAM(s) Oral daily  pantoprazole    Tablet 40 milliGRAM(s) Oral before breakfast  sodium chloride 0.9%. 1000 milliLiter(s) (60 mL/Hr) IV Continuous <Continuous>    MEDICATIONS  (PRN):      REVIEW OF SYSTEMS:  CONSTITUTIONAL: No fever, weight loss, or fatigue  RESPIRATORY: No cough, wheezing, chills or hemoptysis; No shortness of breath  CARDIOVASCULAR: No chest pain, palpitations, dizziness, or leg swelling  GASTROINTESTINAL: No abdominal pain. No nausea, vomiting, or hematemesis; No diarrhea or constipation. No melena or hematochezia.  GENITOURINARY: No dysuria or hematuria, urinary frequency  NEUROLOGICAL: No headaches, memory loss, loss of strength, numbness, or tremors  SKIN: No itching, burning, rashes, or lesions     Vital Signs Last 24 Hrs  T(C): 36.7 (25 Jul 2022 11:11), Max: 37 (25 Jul 2022 04:48)  T(F): 98 (25 Jul 2022 11:11), Max: 98.6 (25 Jul 2022 04:48)  HR: 84 (25 Jul 2022 11:11) (76 - 84)  BP: 129/74 (25 Jul 2022 11:11) (104/58 - 164/71)  BP(mean): --  RR: 18 (25 Jul 2022 11:11) (18 - 20)  SpO2: 98% (25 Jul 2022 11:11) (96% - 98%)    Parameters below as of 25 Jul 2022 11:11  Patient On (Oxygen Delivery Method): nasal cannula        PHYSICAL EXAMINATION:  GENERAL: NAD, well built  HEAD:  Atraumatic, Normocephalic  EYES:  conjunctiva and sclera clear  NECK: Supple, No JVD, Normal thyroid  CHEST/LUNG: Clear to auscultation. Clear to percussion bilaterally; No rales, rhonchi, wheezing, or rubs  HEART: Regular rate and rhythm; No murmurs, rubs, or gallops  ABDOMEN: Soft, Nontender, Nondistended; Bowel sounds present  NERVOUS SYSTEM:  Alert & Oriented X3,    EXTREMITIES:  2+ Peripheral Pulses, No clubbing, cyanosis, or edema  SKIN: warm dry                          15.3   8.93  )-----------( 181      ( 25 Jul 2022 06:02 )             45.7     07-25    141  |  107  |  21<H>  ----------------------------<  146<H>  3.8   |  25  |  1.78<H>    Ca    9.1      25 Jul 2022 06:02  Phos  2.6     07-25  Mg     2.3     07-25    TPro  6.9  /  Alb  3.0<L>  /  TBili  0.7  /  DBili  x   /  AST  15  /  ALT  28  /  AlkPhos  58  07-25    LIVER FUNCTIONS - ( 25 Jul 2022 06:02 )  Alb: 3.0 g/dL / Pro: 6.9 g/dL / ALK PHOS: 58 U/L / ALT: 28 U/L DA / AST: 15 U/L / GGT: x               PT/INR - ( 25 Jul 2022 06:02 )   PT: 13.0 sec;   INR: 1.09 ratio         PTT - ( 25 Jul 2022 13:15 )  PTT:81.6 sec    CAPILLARY BLOOD GLUCOSE      RADIOLOGY & ADDITIONAL TESTS:

## 2022-07-25 NOTE — TRANSFER ACCEPTANCE NOTE - ASSESSMENT
The patient was transferred to [facility] today for cardiac catheterization. Consent is obtained from the patient and is in the patient's chart.

## 2022-07-25 NOTE — PROGRESS NOTE ADULT - REASON FOR ADMISSION
Chest pain

## 2022-07-25 NOTE — PROGRESS NOTE ADULT - PROBLEM SELECTOR PROBLEM 5
Gave report to SHARON Pina who will care for patient post-operatively until ready for discharge.  
Prophylactic measure
Diabetes
Prophylactic measure
Prophylactic measure
H/O: HTN (hypertension)
Diabetes

## 2022-07-25 NOTE — ACUTE INTERFACILITY TRANSFER NOTE - HOSPITAL COURSE
Patient is an 87 yo PMH of COPD and KENRICK on CPAP, diabetes. A fib on Xarelto, RCC s/p right nephrectomy many years ago, CKD, Obese male who lives at home with wife and son, ambulates at baseline, comes in to the ED, due to gradual onset, worsening, intermittent shortness of breath, worse with exertion, associated with pressure like, mid, upper sternal chest discomfort, over the last 4 days, referred to ED by OP provider, s/p aspirin and nitroglycerin which provided improvement in symptoms in the ED, found to have EKG showing A -fib RVR.      Patient is an 88 yo M PMH of COPD and KENRICK on CPAP, diabetes. A fib on Xarelto, RCC s/p right nephrectomy many years ago, CKD, Obese male who lives at home with wife and son, ambulates at baseline, comes in to the ED, due to gradual onset, worsening, intermittent shortness of breath, worse with exertion, associated with pressure like, mid, upper sternal chest discomfort, over the last 4 days, referred to ED by OP provider, s/p aspirin and nitroglycerin which provided improvement in symptoms in the ED, found to have EKG showing A -fib RVR.

## 2022-07-25 NOTE — PROGRESS NOTE ADULT - SUBJECTIVE AND OBJECTIVE BOX
CHIEF COMPLAINT:Patient is a 87y old  Male who presents with a chief complaint of Chest pain.Pt appears comfortable.    	  REVIEW OF SYSTEMS:  CONSTITUTIONAL: No fever, weight loss, or fatigue  EYES: No eye pain, visual disturbances, or discharge  ENT:  No difficulty hearing, tinnitus, vertigo; No sinus or throat pain  NECK: No pain or stiffness  RESPIRATORY: No cough, wheezing, chills or hemoptysis; No Shortness of Breath  CARDIOVASCULAR: No chest pain, palpitations, passing out, dizziness, or leg swelling  GASTROINTESTINAL: No abdominal or epigastric pain. No nausea, vomiting, or hematemesis; No diarrhea or constipation. No melena or hematochezia.  GENITOURINARY: No dysuria, frequency, hematuria, or incontinence  NEUROLOGICAL: No headaches, memory loss, loss of strength, numbness, or tremors  SKIN: No itching, burning, rashes, or lesions   LYMPH Nodes: No enlarged glands  ENDOCRINE: No heat or cold intolerance; No hair loss  MUSCULOSKELETAL: No joint pain or swelling; No muscle, back, or extremity pain  PSYCHIATRIC: No depression, anxiety, mood swings, or difficulty sleeping  HEME/LYMPH: No easy bruising, or bleeding gums  ALLERGY AND IMMUNOLOGIC: No hives or eczema	        PHYSICAL EXAM:  T(C): 36.3 (07-25-22 @ 07:49), Max: 37 (07-25-22 @ 04:48)  HR: 82 (07-25-22 @ 07:49) (70 - 83)  BP: 164/71 (07-25-22 @ 07:49) (104/58 - 164/71)  RR: 18 (07-25-22 @ 07:49) (18 - 20)  SpO2: 98% (07-25-22 @ 07:49) (96% - 98%)        Appearance: Normal	  HEENT:   Normal oral mucosa, PERRL, EOMI	  Lymphatic: No lymphadenopathy  Cardiovascular: Normal S1 S2, No JVD, No murmurs, No edema  Respiratory: Lungs clear to auscultation	  Psychiatry: A & O x 3, Mood & affect appropriate  Gastrointestinal:  Soft, Non-tender, + BS	  Skin: No rashes, No ecchymoses, No cyanosis	  Neurologic: Non-focal  Extremities: Normal range of motion, No clubbing, cyanosis or edema  Vascular: Peripheral pulses palpable 2+ bilaterally    MEDICATIONS  (STANDING):  acetylcysteine  Oral Solution 1200 milliGRAM(s) Oral every 12 hours  aMIOdarone    Tablet 200 milliGRAM(s) Oral daily  atorvastatin 10 milliGRAM(s) Oral at bedtime  heparin  Infusion. 900 Unit(s)/Hr (9 mL/Hr) IV Continuous <Continuous>  hydrALAZINE 25 milliGRAM(s) Oral three times a day  insulin lispro (ADMELOG) corrective regimen sliding scale   SubCutaneous three times a day before meals  insulin lispro (ADMELOG) corrective regimen sliding scale   SubCutaneous at bedtime  isosorbide   mononitrate ER Tablet (IMDUR) 30 milliGRAM(s) Oral daily  metoprolol tartrate 12.5 milliGRAM(s) Oral two times a day  montelukast 10 milliGRAM(s) Oral daily  pantoprazole    Tablet 40 milliGRAM(s) Oral before breakfast  sodium chloride 0.9%. 1000 milliLiter(s) (60 mL/Hr) IV Continuous <Continuous>      TELEMETRY: afib 60-80's	      LABS:	 	                        15.3   8.93  )-----------( 181      ( 25 Jul 2022 06:02 )             45.7     07-25    141  |  107  |  21<H>  ----------------------------<  146<H>  3.8   |  25  |  1.78<H>    Ca    9.1      25 Jul 2022 06:02  Phos  2.6     07-25  Mg     2.3     07-25    TPro  6.9  /  Alb  3.0<L>  /  TBili  0.7  /  DBili  x   /  AST  15  /  ALT  28  /  AlkPhos  58  07-25    proBNP: Serum Pro-Brain Natriuretic Peptide: 1225 pg/mL (07-18 @ 10:04)    Lipid Profile: Cholesterol 156  LDL --  HDL 43    Ldl calc 82  Ratio --    HgA1c:   TSH: Thyroid Stimulating Hormone, Serum: 1.99 uU/mL (07-19 @ 06:58)      	    PT/INR - ( 25 Jul 2022 06:02 )   PT: 13.0 sec;   INR: 1.09 ratio         PTT - ( 25 Jul 2022 06:02 )  PTT:66.4 sec

## 2022-07-25 NOTE — PATIENT PROFILE ADULT - FUNCTIONAL ASSESSMENT - BASIC MOBILITY 6.
3-calculated by average/Not able to assess (calculate score using [facility] averaging method)

## 2022-07-26 LAB
ANION GAP SERPL CALC-SCNC: 12 MMOL/L — SIGNIFICANT CHANGE UP (ref 7–14)
APTT BLD: 104.7 SEC — HIGH (ref 27–36.3)
APTT BLD: 35.5 SEC — SIGNIFICANT CHANGE UP (ref 27–36.3)
BUN SERPL-MCNC: 17 MG/DL — SIGNIFICANT CHANGE UP (ref 7–23)
CALCIUM SERPL-MCNC: 8.8 MG/DL — SIGNIFICANT CHANGE UP (ref 8.4–10.5)
CHLORIDE SERPL-SCNC: 105 MMOL/L — SIGNIFICANT CHANGE UP (ref 98–107)
CO2 SERPL-SCNC: 20 MMOL/L — LOW (ref 22–31)
CREAT SERPL-MCNC: 1.57 MG/DL — HIGH (ref 0.5–1.3)
EGFR: 43 ML/MIN/1.73M2 — LOW
GLUCOSE SERPL-MCNC: 135 MG/DL — HIGH (ref 70–99)
HCT VFR BLD CALC: 45.8 % — SIGNIFICANT CHANGE UP (ref 39–50)
HCT VFR BLD CALC: 46.9 % — SIGNIFICANT CHANGE UP (ref 39–50)
HGB BLD-MCNC: 14.7 G/DL — SIGNIFICANT CHANGE UP (ref 13–17)
HGB BLD-MCNC: 15.4 G/DL — SIGNIFICANT CHANGE UP (ref 13–17)
INR BLD: 1.06 RATIO — SIGNIFICANT CHANGE UP (ref 0.88–1.16)
MAGNESIUM SERPL-MCNC: 2.2 MG/DL — SIGNIFICANT CHANGE UP (ref 1.6–2.6)
MCHC RBC-ENTMCNC: 31.6 PG — SIGNIFICANT CHANGE UP (ref 27–34)
MCHC RBC-ENTMCNC: 32.1 GM/DL — SIGNIFICANT CHANGE UP (ref 32–36)
MCHC RBC-ENTMCNC: 32.4 PG — SIGNIFICANT CHANGE UP (ref 27–34)
MCHC RBC-ENTMCNC: 32.8 GM/DL — SIGNIFICANT CHANGE UP (ref 32–36)
MCV RBC AUTO: 98.5 FL — SIGNIFICANT CHANGE UP (ref 80–100)
MCV RBC AUTO: 98.5 FL — SIGNIFICANT CHANGE UP (ref 80–100)
NRBC # BLD: 0 /100 WBCS — SIGNIFICANT CHANGE UP
NRBC # BLD: 0 /100 WBCS — SIGNIFICANT CHANGE UP
NRBC # FLD: 0 K/UL — SIGNIFICANT CHANGE UP
NRBC # FLD: 0 K/UL — SIGNIFICANT CHANGE UP
PHOSPHATE SERPL-MCNC: 3.1 MG/DL — SIGNIFICANT CHANGE UP (ref 2.5–4.5)
PLATELET # BLD AUTO: 182 K/UL — SIGNIFICANT CHANGE UP (ref 150–400)
PLATELET # BLD AUTO: 189 K/UL — SIGNIFICANT CHANGE UP (ref 150–400)
POTASSIUM SERPL-MCNC: 4.4 MMOL/L — SIGNIFICANT CHANGE UP (ref 3.5–5.3)
POTASSIUM SERPL-SCNC: 4.4 MMOL/L — SIGNIFICANT CHANGE UP (ref 3.5–5.3)
PROTHROM AB SERPL-ACNC: 12.3 SEC — SIGNIFICANT CHANGE UP (ref 10.5–13.4)
RBC # BLD: 4.65 M/UL — SIGNIFICANT CHANGE UP (ref 4.2–5.8)
RBC # BLD: 4.76 M/UL — SIGNIFICANT CHANGE UP (ref 4.2–5.8)
RBC # FLD: 13.2 % — SIGNIFICANT CHANGE UP (ref 10.3–14.5)
RBC # FLD: 13.2 % — SIGNIFICANT CHANGE UP (ref 10.3–14.5)
SODIUM SERPL-SCNC: 137 MMOL/L — SIGNIFICANT CHANGE UP (ref 135–145)
WBC # BLD: 8.24 K/UL — SIGNIFICANT CHANGE UP (ref 3.8–10.5)
WBC # BLD: 8.89 K/UL — SIGNIFICANT CHANGE UP (ref 3.8–10.5)
WBC # FLD AUTO: 8.24 K/UL — SIGNIFICANT CHANGE UP (ref 3.8–10.5)
WBC # FLD AUTO: 8.89 K/UL — SIGNIFICANT CHANGE UP (ref 3.8–10.5)

## 2022-07-26 PROCEDURE — 92978 ENDOLUMINL IVUS OCT C 1ST: CPT | Mod: 26,RC

## 2022-07-26 PROCEDURE — 70450 CT HEAD/BRAIN W/O DYE: CPT | Mod: 26

## 2022-07-26 PROCEDURE — 93010 ELECTROCARDIOGRAM REPORT: CPT

## 2022-07-26 PROCEDURE — 99233 SBSQ HOSP IP/OBS HIGH 50: CPT | Mod: 25

## 2022-07-26 PROCEDURE — 92933 PRQ TRLML C ATHRC ST ANGIOP1: CPT | Mod: 26,RC

## 2022-07-26 RX ORDER — SODIUM CHLORIDE 9 MG/ML
1000 INJECTION INTRAMUSCULAR; INTRAVENOUS; SUBCUTANEOUS
Refills: 0 | Status: DISCONTINUED | OUTPATIENT
Start: 2022-07-26 | End: 2022-07-28

## 2022-07-26 RX ORDER — HEPARIN SODIUM 5000 [USP'U]/ML
INJECTION INTRAVENOUS; SUBCUTANEOUS
Qty: 25000 | Refills: 0 | Status: DISCONTINUED | OUTPATIENT
Start: 2022-07-26 | End: 2022-07-26

## 2022-07-26 RX ORDER — ACETAMINOPHEN 500 MG
1000 TABLET ORAL ONCE
Refills: 0 | Status: COMPLETED | OUTPATIENT
Start: 2022-07-26 | End: 2022-07-26

## 2022-07-26 RX ORDER — ACETYLCYSTEINE 200 MG/ML
1200 VIAL (ML) MISCELLANEOUS EVERY 12 HOURS
Refills: 0 | Status: DISCONTINUED | OUTPATIENT
Start: 2022-07-26 | End: 2022-07-28

## 2022-07-26 RX ADMIN — Medication 50 MILLIGRAM(S): at 21:31

## 2022-07-26 RX ADMIN — CLOPIDOGREL BISULFATE 75 MILLIGRAM(S): 75 TABLET, FILM COATED ORAL at 11:49

## 2022-07-26 RX ADMIN — MONTELUKAST 10 MILLIGRAM(S): 4 TABLET, CHEWABLE ORAL at 11:49

## 2022-07-26 RX ADMIN — HEPARIN SODIUM 2000 UNIT(S)/HR: 5000 INJECTION INTRAVENOUS; SUBCUTANEOUS at 04:26

## 2022-07-26 RX ADMIN — HEPARIN SODIUM 2000 UNIT(S)/HR: 5000 INJECTION INTRAVENOUS; SUBCUTANEOUS at 08:11

## 2022-07-26 RX ADMIN — Medication 12.5 MILLIGRAM(S): at 21:30

## 2022-07-26 RX ADMIN — ATORVASTATIN CALCIUM 40 MILLIGRAM(S): 80 TABLET, FILM COATED ORAL at 21:31

## 2022-07-26 RX ADMIN — LOSARTAN POTASSIUM 25 MILLIGRAM(S): 100 TABLET, FILM COATED ORAL at 06:11

## 2022-07-26 RX ADMIN — Medication 81 MILLIGRAM(S): at 11:49

## 2022-07-26 RX ADMIN — BUDESONIDE AND FORMOTEROL FUMARATE DIHYDRATE 2 PUFF(S): 160; 4.5 AEROSOL RESPIRATORY (INHALATION) at 08:12

## 2022-07-26 RX ADMIN — Medication 50 MILLIGRAM(S): at 14:35

## 2022-07-26 RX ADMIN — TAMSULOSIN HYDROCHLORIDE 0.4 MILLIGRAM(S): 0.4 CAPSULE ORAL at 21:31

## 2022-07-26 RX ADMIN — Medication 12.5 MILLIGRAM(S): at 06:11

## 2022-07-26 RX ADMIN — AMIODARONE HYDROCHLORIDE 200 MILLIGRAM(S): 400 TABLET ORAL at 06:11

## 2022-07-26 RX ADMIN — Medication 50 MILLIGRAM(S): at 06:11

## 2022-07-26 RX ADMIN — ISOSORBIDE MONONITRATE 30 MILLIGRAM(S): 60 TABLET, EXTENDED RELEASE ORAL at 11:48

## 2022-07-26 RX ADMIN — Medication 1200 MILLIGRAM(S): at 06:12

## 2022-07-26 RX ADMIN — PANTOPRAZOLE SODIUM 40 MILLIGRAM(S): 20 TABLET, DELAYED RELEASE ORAL at 06:11

## 2022-07-26 RX ADMIN — Medication 400 MILLIGRAM(S): at 17:06

## 2022-07-26 RX ADMIN — BUDESONIDE AND FORMOTEROL FUMARATE DIHYDRATE 2 PUFF(S): 160; 4.5 AEROSOL RESPIRATORY (INHALATION) at 21:35

## 2022-07-26 RX ADMIN — HEPARIN SODIUM 1800 UNIT(S)/HR: 5000 INJECTION INTRAVENOUS; SUBCUTANEOUS at 12:27

## 2022-07-26 NOTE — CONSULT NOTE ADULT - ASSESSMENT
Renal consulted for GREGORY/CKD Mx.     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 bp stable  c/w hydralzine.   consider holding losartan today and tomorrow  trend bp  low Na in diet when resumed    CAD s/p PCI; afib  Mx per cardiology    Thanks for consulting. will closely follow up.   poc d/w pt, daughter over phone, cardiology team bedside  labs, rad, chart reviewed  For any question, pl call:  Nephrology  Cell -[phone]  Office [phone]  Ans Serv [phone]  [email or web address] ( wkend coverage for Hospital)

## 2022-07-26 NOTE — CHART NOTE - NSCHARTNOTEFT_GEN_A_CORE
VIRGEN AZEVEDO    86yo s/p cardiac cath via Rt femoral access.  Site is stable with no hematoma, active bleeding, or swelling. Dressing is clean/dry/intact. DP & PT pulse is palpable. Patient denies pain, numbness, tingling, CP, SOB. VSS. Will continue to monitor.     T(C): 36.7 (07-26-22 @ 21:24), Max: 36.8 (07-26-22 @ 11:51)  HR: 76 (07-26-22 @ 21:24) (69 - 90)  BP: 147/82 (07-26-22 @ 21:24) (131/74 - 147/82)  RR: 18 (07-26-22 @ 21:24) (16 - 18)  SpO2: 97% (07-26-22 @ 21:24) (96% - 99%)      Luz Oropeza PA-C  Department of Medicine - Night Coverage  [facility]  In House Pager [phone] VIRGEN AZEVEDO    86yo s/p cardiac cath via Rt femoral access.  Site is stable with no hematoma, active bleeding, or swelling. Dressing is clean/dry/intact. DP & PT pulse is palpable. Patient denies ant pain, numbness, tingling, CP, SOB. VSS. Reports complete resolution of occipital HA s/p IV Tylenol - denies any visual changes, dizziness, weakness, or n/v. Will continue to monitor.     T(C): 36.7 (07-26-22 @ 21:24), Max: 36.8 (07-26-22 @ 11:51)  HR: 76 (07-26-22 @ 21:24) (69 - 90)  BP: 147/82 (07-26-22 @ 21:24) (131/74 - 147/82)  RR: 18 (07-26-22 @ 21:24) (16 - 18)  SpO2: 97% (07-26-22 @ 21:24) (96% - 99%)      Luz Oropeza PA-C  Department of Medicine - Night Coverage  [facility]  In House Pager [phone]

## 2022-07-26 NOTE — CHART NOTE - NSCHARTNOTEFT_GEN_A_CORE
Patient seen and evaluated at bedside post cardiac cath and stent placement. Pt reports severe occipital headache which started post procedurally. Pt denies any other associated symptoms, including dizziness, visual deficits, chest pain, shortness of breath, palpitations, syncope. Vitals remain stable. Neurological examination normal: A&Ox3, no dysarthria, CN II-XII intact, motor/sensory examination normal, gait not assessed as pt on bed rest. Will give Tylenol 1000mg IVP x 1 and continue to monitor. Will re-assess. Patient seen and evaluated at bedside post cardiac cath and stent placement. Pt reports severe occipital headache which started post procedurally. Pt denies any other associated symptoms, including dizziness, visual deficits, chest pain, shortness of breath, palpitations, syncope. Vitals remain stable. Neurological examination normal: A&Ox3, no dysarthria, CN II-XII intact, motor/sensory examination normal, gait not assessed as pt on bed rest. Will give Tylenol 1000mg IVP x 1 and continue to monitor. Discussed with Dr. Reyes, will obtain CT head and continue to re-assess.

## 2022-07-26 NOTE — CONSULT NOTE ADULT - SUBJECTIVE AND OBJECTIVE BOX
[facility] - S Home / Rachid S /D Olga/ S Isabel/ S Dimitrios/ Taocs Coronel / ARMIDA Lund/ O Kim  service -[phone], office [phone]  ---------------------------------------------------------------------------------------------------------------  Patient seen and examined in cath recovery room    86 y.o. French speaking male with PMH of A. Fib (on Xarelto), DM II, COPD, KENRICK on CPAP, RCC s/p right nephrectomy, CKD – was admitted to [facility] on 22 c/o shortness of breath with exertion getting progressively worse. pt was admitted to [facility] with NSTEMI, A. Fib with RVR -w/Elevated Troponin, was started on Heparin drip. completed Amiodarone load; presently is on Amiodarone 200 mg PO daily, Lopressor 25 mg PO BID. pt was also rx for UTI- completed course of Ceftriaxone x 5 days.     The patient was transferred to [facility] for cardiac catheterization. Renal consulted for CKD Mx, JEFF prophylaxis.  pt was evaluated and seen by my partner Dr Lund at [facility] for same  hisry obtained from primary team, chart and pts daughter over phone- has known h/o CKD, has a primary nephrologist in community  pt seen and examined s/p cardiac cath, s/p stent x1 today  pt c/o HA, was given iv Tylenol     CXR 22 Cardiomegaly. No large airspace consolidation or effusion.  Echo 22  Normal left ventricular systolic function.  Stress test 22 There are small, severe defects in basal and mid  inferior, basal infero-lateral walls that are reversible  consistent with  ischemia.  * The inferior and infero-lateral walls are hypokinesis  EF=45%.      PAST MEDICAL & SURGICAL HISTORY:  KENRICK and COPD overlap syndrome      H/O: HTN (hypertension)      Renal cell cancer  s/p R nephrectomy 7 years ago      DM2 (diabetes mellitus, type 2)      HLD (hyperlipidemia)      CKD (chronic kidney disease)    Chronic atrial fibrillation  on Xarelto    BPH (benign prostatic hyperplasia)    H/O right nephrectomy    S/P cholecystectomy    Allergies: No Known Allergies    Home Medications Reviewed  Hospital Medications:   MEDICATIONS  (STANDING):  acetylcysteine  Oral Solution 1200 milliGRAM(s) Oral every 12 hours  acetylcysteine  Oral Solution 1200 milliGRAM(s) Oral every 12 hours  aMIOdarone    Tablet 200 milliGRAM(s) Oral daily  aspirin enteric coated 81 milliGRAM(s) Oral daily  atorvastatin 40 milliGRAM(s) Oral at bedtime  budesonide  80 MICROgram(s)/formoterol 4.5 MICROgram(s) Inhaler 2 Puff(s) Inhalation two times a day  clopidogrel Tablet 75 milliGRAM(s) Oral daily  dextrose 5%. 1000 milliLiter(s) (50 mL/Hr) IV Continuous <Continuous>  dextrose 5%. 1000 milliLiter(s) (100 mL/Hr) IV Continuous <Continuous>  dextrose 50% Injectable 25 Gram(s) IV Push once  dextrose 50% Injectable 12.5 Gram(s) IV Push once  dextrose 50% Injectable 25 Gram(s) IV Push once  glucagon  Injectable 1 milliGRAM(s) IntraMuscular once  hydrALAZINE 50 milliGRAM(s) Oral three times a day  insulin lispro (ADMELOG) corrective regimen sliding scale   SubCutaneous three times a day before meals  isosorbide   mononitrate ER Tablet (IMDUR) 30 milliGRAM(s) Oral daily  losartan 25 milliGRAM(s) Oral daily  metoprolol tartrate 12.5 milliGRAM(s) Oral two times a day  montelukast 10 milliGRAM(s) Oral daily  pantoprazole    Tablet 40 milliGRAM(s) Oral before breakfast  sodium chloride 0.9%. 1000 milliLiter(s) (100 mL/Hr) IV Continuous <Continuous>  sodium chloride 0.9%. 1000 milliLiter(s) (75 mL/Hr) IV Continuous <Continuous>  tamsulosin 0.4 milliGRAM(s) Oral at bedtime    SOCIAL HISTORY:  Denies ETOh,Smoking, illicit drug use  FAMILY HISTORY:  FH: heart attack (Mother)    REVIEW OF SYSTEMS:  CONSTITUTIONAL: No weakness, fevers or chills  EYES/ENT: No visual changes;  No vertigo or throat pain   NECK: No pain or stiffness  RESPIRATORY: No cough, wheezing, hemoptysis; No shortness of breath  CARDIOVASCULAR: No chest pain or palpitations.  GASTROINTESTINAL: No abdominal or epigastric pain. No nausea, vomiting, or hematemesis; No diarrhea or constipation. No melena or hematochezia.  GENITOURINARY: No dysuria, frequency, foamy urine, urinary urgency, incontinence or hematuria  NEUROLOGICAL: No numbness or weakness  SKIN: No itching, burning, rashes, or lesions   VASCULAR: No bilateral lower extremity edema.   All other review of systems is negative unless indicated above.    VITALS:  T(F): 97.5 (22 @ 13:23), Max: 98.3 (22 @ 11:51)  HR: 88 (22 @ 13:23)  BP: 131/74 (22 @ 13:23)  RR: 18 (22 @ 13:23)  SpO2: 97% (22 @ 13:23)  Wt(kg): --     @ 07:01  -   @ 07:00  --------------------------------------------------------  IN: 240 mL / OUT: 200 mL / NET: 40 mL      Height (cm): 175.3 ( @ 14:47)  Weight (kg): 111.2 ( @ 20:59)  BMI (kg/m2): 36.2 ( @ 14:47)  BSA (m2): 2.25 ( @ 14:47)    PHYSICAL EXAM:  Constitutional: NAD, obese  HEENT: anicteric sclera  Neck: No JVD  Respiratory: CTAB, no wheezes, rales or rhonchi  Cardiovascular: S1, S2, RRR  Gastrointestinal: BS+, soft, NT  Extremities: No peripheral edema  Neurological: A/O x 3, no focal deficits  Psychiatric: Normal mood, normal affect  : No carlin.     LABS:      137  |  105  |  17  ----------------------------<  135<H>  4.4   |  20<L>  |  1.57<H>    Ca    8.8      2022 04:12  Phos  3.1       Mg     2.20         TPro  6.9  /  Alb  3.0<L>  /  TBili  0.7  /  DBili      /  AST  15  /  ALT  28  /  AlkPhos  58      Creatinine Trend: 1.57 <--, 1.78 <--, 1.83 <--, 1.69 <--, 1.81 <--, 2.04 <--, 1.92 <--                        15.4   8.89  )-----------( 189      ( 2022 10:28 )             46.9     Urine Studies:  Urinalysis Basic - ( 2022 21:40 )    Color: Yellow / Appearance: Clear / S.020 / pH:   Gluc:  / Ketone: Negative  / Bili: Negative / Urobili: Negative   Blood:  / Protein: 100 / Nitrite: Negative   Leuk Esterase: Moderate / RBC: 0-2 /HPF / WBC 6-10 /HPF   Sq Epi:  / Non Sq Epi: Few /HPF / Bacteria: Few /HPF      Osmolality, Random Urine: 630 mos/kg ( @ 21:40)  Sodium, Random Urine: 19 mmol/L ( @ 21:40)  Creatinine, Random Urine: 174 mg/dL ( @ 21:40)      RADIOLOGY & ADDITIONAL STUDIES:  < from: CT Head No Cont (22 @ 17:25) >    IMPRESSION: Age-appropriate involutional and ischemic gliotic changes. No   hemorrhage.    --- End of Report ---    < end of copied text >    < from: Xray Chest 1 View- PORTABLE-Routine (Xray Chest 1 View- PORTABLE-Routine .) (22 @ 11:54) >  IMPRESSION: Cardiomegaly.  No large airspace consolidation or effusion.    --- End of Report ---    < end of copied text >

## 2022-07-26 NOTE — CHART NOTE - NSCHARTNOTEFT_GEN_A_CORE
7/26 s/p Cardiac cath RCA stent x1  - monitor RFA/V access  - stop Heparin qtt  - restart Xarelto on 7/27  - stop Aspirin on 7/27  - continue Plavix  Please send prescriptions for Plavix, Amiodarone, Imdur. 7/26 s/p Cardiac cath RCA stent x1.  As per Dr. Reyes,  - monitor RFA/V access  - stop Heparin qtt  - restart Xarelto on 7/27  - stop Aspirin on 7/27  - continue Plavix  Please send prescriptions for Plavix, Amiodarone, Imdur.

## 2022-07-26 NOTE — CHART NOTE - NSCHARTNOTEFT_GEN_A_CORE
VIRGEN AZEVEDO 87yis s/p cardiac cath via right radial access.  Site is stable with no hematoma, active bleed or swelling.  Dressing is clean/dry/intact.  Distal pulse is palpable.  Patient denies pain, numbness, tingling, CP, SOB. VSS. Will continue to monitor.     T(C): 36.4 (07-25-22 @ 20:59), Max: 37 (07-25-22 @ 04:48)  HR: 64 (07-25-22 @ 20:59) (64 - 84)  BP: 151/76 (07-25-22 @ 20:59) (123/67 - 164/71)  RR: 18 (07-25-22 @ 20:59) (18 - 20)  SpO2: 98% (07-25-22 @ 20:59) (98% - 98%)    Heparin gtt initiated, no bolus.

## 2022-07-27 LAB
ANION GAP SERPL CALC-SCNC: 13 MMOL/L — SIGNIFICANT CHANGE UP (ref 7–14)
BUN SERPL-MCNC: 12 MG/DL — SIGNIFICANT CHANGE UP (ref 7–23)
CALCIUM SERPL-MCNC: 9.5 MG/DL — SIGNIFICANT CHANGE UP (ref 8.4–10.5)
CHLORIDE SERPL-SCNC: 102 MMOL/L — SIGNIFICANT CHANGE UP (ref 98–107)
CO2 SERPL-SCNC: 24 MMOL/L — SIGNIFICANT CHANGE UP (ref 22–31)
CREAT SERPL-MCNC: 1.46 MG/DL — HIGH (ref 0.5–1.3)
EGFR: 47 ML/MIN/1.73M2 — LOW
GLUCOSE SERPL-MCNC: 163 MG/DL — HIGH (ref 70–99)
HCT VFR BLD CALC: 43.6 % — SIGNIFICANT CHANGE UP (ref 39–50)
HGB BLD-MCNC: 14.2 G/DL — SIGNIFICANT CHANGE UP (ref 13–17)
MAGNESIUM SERPL-MCNC: 2.1 MG/DL — SIGNIFICANT CHANGE UP (ref 1.6–2.6)
MCHC RBC-ENTMCNC: 32.2 PG — SIGNIFICANT CHANGE UP (ref 27–34)
MCHC RBC-ENTMCNC: 32.6 GM/DL — SIGNIFICANT CHANGE UP (ref 32–36)
MCV RBC AUTO: 98.9 FL — SIGNIFICANT CHANGE UP (ref 80–100)
NRBC # BLD: 0 /100 WBCS — SIGNIFICANT CHANGE UP
NRBC # FLD: 0 K/UL — SIGNIFICANT CHANGE UP
PHOSPHATE SERPL-MCNC: 2.8 MG/DL — SIGNIFICANT CHANGE UP (ref 2.5–4.5)
PLATELET # BLD AUTO: 198 K/UL — SIGNIFICANT CHANGE UP (ref 150–400)
POTASSIUM SERPL-MCNC: 4.3 MMOL/L — SIGNIFICANT CHANGE UP (ref 3.5–5.3)
POTASSIUM SERPL-SCNC: 4.3 MMOL/L — SIGNIFICANT CHANGE UP (ref 3.5–5.3)
RBC # BLD: 4.41 M/UL — SIGNIFICANT CHANGE UP (ref 4.2–5.8)
RBC # FLD: 13.2 % — SIGNIFICANT CHANGE UP (ref 10.3–14.5)
SODIUM SERPL-SCNC: 139 MMOL/L — SIGNIFICANT CHANGE UP (ref 135–145)
WBC # BLD: 8.55 K/UL — SIGNIFICANT CHANGE UP (ref 3.8–10.5)
WBC # FLD AUTO: 8.55 K/UL — SIGNIFICANT CHANGE UP (ref 3.8–10.5)

## 2022-07-27 RX ORDER — RIVAROXABAN 15 MG-20MG
15 KIT ORAL
Refills: 0 | Status: DISCONTINUED | OUTPATIENT
Start: 2022-07-27 | End: 2022-07-28

## 2022-07-27 RX ADMIN — BUDESONIDE AND FORMOTEROL FUMARATE DIHYDRATE 2 PUFF(S): 160; 4.5 AEROSOL RESPIRATORY (INHALATION) at 21:36

## 2022-07-27 RX ADMIN — Medication 50 MILLIGRAM(S): at 14:50

## 2022-07-27 RX ADMIN — BUDESONIDE AND FORMOTEROL FUMARATE DIHYDRATE 2 PUFF(S): 160; 4.5 AEROSOL RESPIRATORY (INHALATION) at 08:33

## 2022-07-27 RX ADMIN — Medication 12.5 MILLIGRAM(S): at 17:38

## 2022-07-27 RX ADMIN — CLOPIDOGREL BISULFATE 75 MILLIGRAM(S): 75 TABLET, FILM COATED ORAL at 12:49

## 2022-07-27 RX ADMIN — Medication 1: at 17:37

## 2022-07-27 RX ADMIN — AMIODARONE HYDROCHLORIDE 200 MILLIGRAM(S): 400 TABLET ORAL at 05:11

## 2022-07-27 RX ADMIN — Medication 1: at 08:34

## 2022-07-27 RX ADMIN — LOSARTAN POTASSIUM 25 MILLIGRAM(S): 100 TABLET, FILM COATED ORAL at 05:10

## 2022-07-27 RX ADMIN — TAMSULOSIN HYDROCHLORIDE 0.4 MILLIGRAM(S): 0.4 CAPSULE ORAL at 21:40

## 2022-07-27 RX ADMIN — Medication 50 MILLIGRAM(S): at 05:10

## 2022-07-27 RX ADMIN — Medication 1: at 12:49

## 2022-07-27 RX ADMIN — Medication 12.5 MILLIGRAM(S): at 05:12

## 2022-07-27 RX ADMIN — RIVAROXABAN 15 MILLIGRAM(S): KIT at 17:40

## 2022-07-27 RX ADMIN — PANTOPRAZOLE SODIUM 40 MILLIGRAM(S): 20 TABLET, DELAYED RELEASE ORAL at 05:10

## 2022-07-27 RX ADMIN — MONTELUKAST 10 MILLIGRAM(S): 4 TABLET, CHEWABLE ORAL at 12:49

## 2022-07-27 RX ADMIN — ISOSORBIDE MONONITRATE 30 MILLIGRAM(S): 60 TABLET, EXTENDED RELEASE ORAL at 12:49

## 2022-07-27 RX ADMIN — ATORVASTATIN CALCIUM 40 MILLIGRAM(S): 80 TABLET, FILM COATED ORAL at 21:40

## 2022-07-27 RX ADMIN — Medication 50 MILLIGRAM(S): at 21:40

## 2022-07-27 NOTE — PROGRESS NOTE ADULT - SUBJECTIVE AND OBJECTIVE BOX
Interval:  - Patient seen and examined at bedside with wife.  - Patient denies CP, SOB, palpitations or HA  - Urinating well   - No pain in right wrist or right groin   - CTH negative overnight    Tele:  - Afib HR mostly 70s to 80s with some PVCs     HPI:  86 y.o. Venezuelan speaking male with PMH of A. Fib (on Xarelto), DM II, COPD, KENRICK on CPAP, RCC s/p right nephrectomy, CKD – was admitted to [facility] on 7/18/22 c/o shortness of breath with exertion getting progressively worse. The patient admits to pressure like, midsternal chest discomfort, started 4 days ago. He admits to occasional  palpitations. The patient denies dizziness, presyncope, syncope,  headache, visual disturbances, CVA, PE, DVT, KENRICK, abdominal pain, N/V/D/C, hematochezia, melena, dysuria, hematuria, fever, chills.  The patient was evaluated by his PCP, was noted to have A. Fib with RVR, and recommended for the patient to go to ER.   Upon arrival to [facility] ER, the patient was found to have:  - Elevated Troponin - started on Heparin drip.   - A. Fib with RVR - completed Amiodarone load; presently is on Amiodarone 200 mg PO daily, Lopressor 25 mg PO BID.  - UTI- completed course of Ceftriaxone x 5 days.     CXR 7/18/22 Cardiomegaly. No large airspace consolidation or effusion.  Echo 7/19/22   1. Normal mitral valve. Mild mitral regurgitation.  2. Normal trileaflet aortic valve.  3. Aortic Root: 4.4 cm.  4. Normal left atrium.  LA volume index = 25 cc/m2.  5. Moderate concentric left ventricular hypertrophy.  6. Normal left ventricular systolic function.  7. Unable to adequately assess diastolic function due to  technical aspects of this study.  8. Normal right atrium.  9. Normal right ventricular size and systolic function  (TAPSE 2.3 cm).  10. RV systolic pressure is moderately increased at 48 mm  Hg.  11. There is mild tricuspid regurgitation.  12. There is mild pulmonic regurgitation.  13. Normal pericardium with no pericardial effusion.    Stress test 7/22/22 There are small, severe defects in basal and mid  inferior, basal infero-lateral walls that are reversible  consistent with  ischemia.  * The inferior and infero-lateral walls are hypokinesis  EF=45%.    The patient was transferred to [facility] for cardiac catheterization.        (25 Jul 2022 16:21)      Medications:  acetylcysteine  Oral Solution 1200 milliGRAM(s) Oral every 12 hours  acetylcysteine  Oral Solution 1200 milliGRAM(s) Oral every 12 hours  aMIOdarone    Tablet 200 milliGRAM(s) Oral daily  atorvastatin 40 milliGRAM(s) Oral at bedtime  budesonide  80 MICROgram(s)/formoterol 4.5 MICROgram(s) Inhaler 2 Puff(s) Inhalation two times a day  clopidogrel Tablet 75 milliGRAM(s) Oral daily  dextrose 5%. 1000 milliLiter(s) IV Continuous <Continuous>  dextrose 5%. 1000 milliLiter(s) IV Continuous <Continuous>  dextrose 50% Injectable 25 Gram(s) IV Push once  dextrose 50% Injectable 12.5 Gram(s) IV Push once  dextrose 50% Injectable 25 Gram(s) IV Push once  dextrose Oral Gel 15 Gram(s) Oral once PRN  glucagon  Injectable 1 milliGRAM(s) IntraMuscular once  hydrALAZINE 50 milliGRAM(s) Oral three times a day  insulin lispro (ADMELOG) corrective regimen sliding scale   SubCutaneous three times a day before meals  isosorbide   mononitrate ER Tablet (IMDUR) 30 milliGRAM(s) Oral daily  losartan 25 milliGRAM(s) Oral daily  metoprolol tartrate 12.5 milliGRAM(s) Oral two times a day  montelukast 10 milliGRAM(s) Oral daily  pantoprazole    Tablet 40 milliGRAM(s) Oral before breakfast  rivaroxaban 15 milliGRAM(s) Oral with dinner  sodium chloride 0.9%. 1000 milliLiter(s) IV Continuous <Continuous>  sodium chloride 0.9%. 1000 milliLiter(s) IV Continuous <Continuous>  tamsulosin 0.4 milliGRAM(s) Oral at bedtime      Review of Systems:      Vitals:  T(C): 36.6 (07-27-22 @ 05:08), Max: 36.8 (07-26-22 @ 11:51)  HR: 72 (07-27-22 @ 05:08) (72 - 90)  BP: 144/65 (07-27-22 @ 05:08) (131/74 - 147/82)  BP(mean): --  RR: 18 (07-27-22 @ 05:08) (18 - 18)  SpO2: 98% (07-27-22 @ 05:08) (96% - 98%)  Wt(kg): --  Daily     Daily   I&O's Summary      Physical Exam:  General: NAD  Eye: PERRL, EOMI  HENT: Normal oral mucosa NC/AT  CV: Normal S1/S2, RRR, No M/R/G, no edema, no elevation in JVP  Resp: Normal respiratory effort, clear to auscultation bilaterally, no wheezing, no crackles  Abd: Soft, Non-tender, Non-distended, BS+  Ext: Neurovascular function intact right wrist no pain or signs of hematoma. Good dorsalis pedis and posterior tibial pulse in R foot no signs of hematoma in R groin. Sensation intact   Neuro: Non-focal  Psych: AAOx3, Mood & affect appropriate  Skin: No rashes +mild ecchymosis in right femoral/groin area     Labs:                        14.2   8.55  )-----------( 198      ( 27 Jul 2022 06:10 )             43.6     07-26    137  |  105  |  17  ----------------------------<  135<H>  4.4   |  20<L>  |  1.57<H>    Ca    8.8      26 Jul 2022 04:12  Phos  3.1     07-26  Mg     2.20     07-26      PT/INR - ( 26 Jul 2022 04:12 )   PT: 12.3 sec;   INR: 1.06 ratio         PTT - ( 26 Jul 2022 11:35 )  PTT:104.7 sec              New results/imaging:  
Feeling well overall  No chest pain today.     CXR 7/18/22 Cardiomegaly. No large airspace consolidation or effusion.  Echo 7/19/22   1. Normal mitral valve. Mild mitral regurgitation.  2. Normal trileaflet aortic valve.  3. Aortic Root: 4.4 cm.  4. Normal left atrium.  LA volume index = 25 cc/m2.  5. Moderate concentric left ventricular hypertrophy.  6. Normal left ventricular systolic function.  7. Unable to adequately assess diastolic function due to  technical aspects of this study.  8. Normal right atrium.  9. Normal right ventricular size and systolic function  (TAPSE 2.3 cm).  10. RV systolic pressure is moderately increased at 48 mm  Hg.  11. There is mild tricuspid regurgitation.  12. There is mild pulmonic regurgitation.  13. Normal pericardium with no pericardial effusion.    Stress test 7/22/22 There are small, severe defects in basal and mid  inferior, basal infero-lateral walls that are reversible  consistent with  ischemia.  * The inferior and infero-lateral walls are hypokinesis  EF=45%.    The patient was [facility] for cardiac catheter[facility].        (25 Jul 2022 16:21)    Allergies    No Known Allergies    Intolerances        PAST MEDICAL & SURGICAL HISTORY:  KENRICK and COPD overlap syndrome  H/O: HTN (hypertension)  Renal cell cancer  s/p R nephrectomy 7 years ago  DM2 (diabetes mellitus, type 2)  HLD (hyperlipidemia)  CKD (chronic kidney disease)  Chronic atrial fibrillation  on Xarelto  BPH (benign prostatic hyperplasia)  /O right nephrectomyS/P cholecystectomy        Home Medications:  amLODIPine 5 mg oral tablet: 1 tab(s) orally once a day (25 Jul 2022 19:48)  atorvastatin 10 mg oral tablet: 1 tab(s) orally once a day (at bedtime) (25 Jul 2022 19:48)  Breo Ellipta 100 mcg-25 mcg/inh inhalation powder: 1 puff(s) inhaled once a day (25 Jul 2022 19:48)  Farxiga 10 mg oral tablet: 1 tab(s) orally once a day (25 Jul 2022 19:48)  hydrALAZINE 50 mg oral tablet: 1 tab(s) orally 3 times a day (25 Jul 2022 19:48)  Januvia 50 mg oral tablet: 1 tab(s) orally once a day (25 Jul 2022 19:48)  meloxicam 7.5 mg oral tablet: 1 tab(s) orally once a day (25 Jul 2022 19:48)  montelukast 10 mg oral tablet: 1 tab(s) orally once a day (25 Jul 2022 19:48)  olmesartan:  (25 Jul 2022 19:48)  Symbicort 80 mcg-4.5 mcg/inh inhalation aerosol: 2 puff(s) inhaled 2 times a day (25 Jul 2022 19:48)  tamsulosin 0.4 mg oral capsule: 1 cap(s) orally once a day (25 Jul 2022 19:48)  Vascepa 1 g oral capsule: 1  orally once a day (25 Jul 2022 19:48)  Xarelto 15 mg oral tablet: 1 tab(s) orally once a day (in the evening) (25 Jul 2022 19:48)      Physical Examination:   General: Awake, alert, speech clear, no acute distress.  Neck: No bruit, normal jugular venous pressures  Chest: Clear CTA, S1, S2, no murmur, irregular  Extremities: No edema                          15.4   8.89  )-----------( 189      ( 26 Jul 2022 10:28 )             46.9     07-26    137  |  105  |  17  ----------------------------<  135<H>  4.4   |  20<L>  |  1.57<H>    Ca    8.8      26 Jul 2022 04:12  Phos  3.1     07-26  Mg     2.20     07-26    TPro  6.9  /  Alb  3.0<L>  /  TBili  0.7  /  DBili  x   /  AST  15  /  ALT  28  /  AlkPhos  58  07-25        PT/INR - ( 26 Jul 2022 04:12 )   PT: 12.3 sec;   INR: 1.06 ratio         PTT - ( 26 Jul 2022 11:35 )  PTT:104.7 sec    Assessment and Plan:   The patient was examined and interviewed by me today. Lot of discussion relating to management of his CAD.     Problem List:   1. Details of cardiac cathterization procedure were explained to the patients, including risk and benefits. Procedure details including access site options, procedure risks not just limited to bleeding, MI, CVA, renal failure, allergic reactions , death and so forth. Patient has signed a written consent for the procedure.       2. CKD: will hydrate accordingly as per LVEDP    3. AFIB> Continie current meds, restart NOAC after CATH         
[facility] - S Home / Rachid S /D Olga/ S Isabel/ DIGNA Plunkett/ Tacos Coronel / ARMIDA Moseru/ O Kim  service -[phone], office [phone]  ---------------------------------------------------------------------------------------------------------------    Patient seen and examined bedside    Subjective and Objective: No overnight events, denied cp/ sob. No complaints today.    Allergies: No Known Allergies      Hospital Medications:   MEDICATIONS  (STANDING):  acetylcysteine  Oral Solution 1200 milliGRAM(s) Oral every 12 hours  acetylcysteine  Oral Solution 1200 milliGRAM(s) Oral every 12 hours  aMIOdarone    Tablet 200 milliGRAM(s) Oral daily  atorvastatin 40 milliGRAM(s) Oral at bedtime  budesonide  80 MICROgram(s)/formoterol 4.5 MICROgram(s) Inhaler 2 Puff(s) Inhalation two times a day  clopidogrel Tablet 75 milliGRAM(s) Oral daily  dextrose 5%. 1000 milliLiter(s) (50 mL/Hr) IV Continuous <Continuous>  dextrose 5%. 1000 milliLiter(s) (100 mL/Hr) IV Continuous <Continuous>  dextrose 50% Injectable 25 Gram(s) IV Push once  dextrose 50% Injectable 12.5 Gram(s) IV Push once  dextrose 50% Injectable 25 Gram(s) IV Push once  glucagon  Injectable 1 milliGRAM(s) IntraMuscular once  hydrALAZINE 50 milliGRAM(s) Oral three times a day  insulin lispro (ADMELOG) corrective regimen sliding scale   SubCutaneous three times a day before meals  isosorbide   mononitrate ER Tablet (IMDUR) 30 milliGRAM(s) Oral daily  losartan 25 milliGRAM(s) Oral daily  metoprolol tartrate 12.5 milliGRAM(s) Oral two times a day  montelukast 10 milliGRAM(s) Oral daily  pantoprazole    Tablet 40 milliGRAM(s) Oral before breakfast  rivaroxaban 15 milliGRAM(s) Oral with dinner  sodium chloride 0.9%. 1000 milliLiter(s) (100 mL/Hr) IV Continuous <Continuous>  sodium chloride 0.9%. 1000 milliLiter(s) (75 mL/Hr) IV Continuous <Continuous>  tamsulosin 0.4 milliGRAM(s) Oral at bedtime      VITALS:  T(F): 97.4 (07-27-22 @ 17:31), Max: 98.5 (07-27-22 @ 11:34)  HR: 80 (07-27-22 @ 17:31)  BP: 141/78 (07-27-22 @ 17:31)  RR: 18 (07-27-22 @ 17:31)  SpO2: 96% (07-27-22 @ 17:31)  Wt(kg): --        PHYSICAL EXAM:  Constitutional: NAD, obese  HEENT: anicteric sclera  Neck: No JVD  Respiratory: CTAB, no wheezes, rales or rhonchi  Cardiovascular: S1, S2, RRR  Gastrointestinal: BS+, soft, NT  Extremities: No peripheral edema  Neurological: A/O x 3, no focal deficits  Psychiatric: Normal mood, normal affect  : No carlin.     LABS:  07-27    139  |  102  |  12  ----------------------------<  163<H>  4.3   |  24  |  1.46<H>    Ca    9.5      27 Jul 2022 11:00  Phos  2.8     07-27  Mg     2.10     07-27      Creatinine Trend: 1.46 <--, 1.57 <--, 1.78 <--, 1.83 <--, 1.69 <--, 1.81 <--, 2.04 <--                        14.2   8.55  )-----------( 198      ( 27 Jul 2022 06:10 )             43.6     Urine Studies:        RADIOLOGY & ADDITIONAL STUDIES:

## 2022-07-27 NOTE — PROGRESS NOTE ADULT - ASSESSMENT
86 y.o. Kiswahili speaking male with PMH of A. Fib (on Xarelto), DM II, COPD, KENRICK on CPAP, RCC s/p right nephrectomy, CKD transfer from [facility] s/p PCI 7/26 PAUL x 1 to RCA    #CAD s/p PAUL x 1 to RCA  - Hold ASA continue Plavix and start Xarelto   - Continue Lopressor 12.5mg Q12H  - Continue Atorvastatin 40mg Q12H    #Afib  - Restart Xarelto   - Continue Lopressor as above     #CKD - s/p post-cath IVF   - Would check SCr this AM   - Renal following       Ed Painter MD  Cardiology Fellow PGY-5 86 y.o. Kyrgyz speaking male with PMH of A. Fib (on Xarelto), DM II, COPD, KENRICK on CPAP, RCC s/p right nephrectomy, CKD transfer from [facility] s/p PCI 7/26 PAUL x 1 to RCA    #CAD s/p PAUL x 1 to RCA  - Hold ASA continue Plavix and start Xarelto   - Continue Lopressor 12.5mg Q12H  - Continue Atorvastatin 40mg Q12H    #Afib  - Restart Xarelto   - Continue Lopressor as above     #CKD - s/p post-cath IVF   - Would check SCr this AM   - Renal following       Ed Painter MD  Cardiology Fellow PGY-5  Phone: [phone]    For all New Consults  [email or web address]   Login: [email or web address]

## 2022-07-27 NOTE — PROGRESS NOTE ADULT - ASSESSMENT
Renal following for GREGORY/CKD Mx.     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 s/p mucomyst 1200mg po bid pre and post cath and ivf/NS    can c/w losartan 25 milliGRAM(s) Oral daily  avoid nephrotoxins if able/NSAIDs  trend bmp daily  dose meds for eGFR    Hypertension controlled. bp stable  c/w hydralzine.   trend bp  low Na in diet when resumed    CAD s/p PCI; afib  Mx per cardiology    d/c plan per team. outpt f/u w/renal upon d/c   will closely follow up.   poc d/w pt  labs, chart reviewed  For any question, pl call:  Nephrology  Cell -[phone]  Office [phone]  Ans Serv [phone]  [email or web address] [email or web address] ( wkend coverage for Hospital)

## 2022-07-28 ENCOUNTER — TRANSCRIPTION ENCOUNTER (OUTPATIENT)
Age: 87
End: 2022-07-28

## 2022-07-28 VITALS — SYSTOLIC BLOOD PRESSURE: 126 MMHG | DIASTOLIC BLOOD PRESSURE: 73 MMHG

## 2022-07-28 LAB
ANION GAP SERPL CALC-SCNC: 11 MMOL/L — SIGNIFICANT CHANGE UP (ref 7–14)
BUN SERPL-MCNC: 13 MG/DL — SIGNIFICANT CHANGE UP (ref 7–23)
CALCIUM SERPL-MCNC: 9 MG/DL — SIGNIFICANT CHANGE UP (ref 8.4–10.5)
CHLORIDE SERPL-SCNC: 107 MMOL/L — SIGNIFICANT CHANGE UP (ref 98–107)
CO2 SERPL-SCNC: 22 MMOL/L — SIGNIFICANT CHANGE UP (ref 22–31)
CREAT SERPL-MCNC: 1.61 MG/DL — HIGH (ref 0.5–1.3)
EGFR: 42 ML/MIN/1.73M2 — LOW
GLUCOSE SERPL-MCNC: 151 MG/DL — HIGH (ref 70–99)
HCT VFR BLD CALC: 41.6 % — SIGNIFICANT CHANGE UP (ref 39–50)
HGB BLD-MCNC: 14 G/DL — SIGNIFICANT CHANGE UP (ref 13–17)
MAGNESIUM SERPL-MCNC: 2.1 MG/DL — SIGNIFICANT CHANGE UP (ref 1.6–2.6)
MCHC RBC-ENTMCNC: 32.9 PG — SIGNIFICANT CHANGE UP (ref 27–34)
MCHC RBC-ENTMCNC: 33.7 GM/DL — SIGNIFICANT CHANGE UP (ref 32–36)
MCV RBC AUTO: 97.7 FL — SIGNIFICANT CHANGE UP (ref 80–100)
NRBC # BLD: 0 /100 WBCS — SIGNIFICANT CHANGE UP
NRBC # FLD: 0 K/UL — SIGNIFICANT CHANGE UP
PHOSPHATE SERPL-MCNC: 3.1 MG/DL — SIGNIFICANT CHANGE UP (ref 2.5–4.5)
PLATELET # BLD AUTO: 190 K/UL — SIGNIFICANT CHANGE UP (ref 150–400)
POTASSIUM SERPL-MCNC: 4 MMOL/L — SIGNIFICANT CHANGE UP (ref 3.5–5.3)
POTASSIUM SERPL-SCNC: 4 MMOL/L — SIGNIFICANT CHANGE UP (ref 3.5–5.3)
RBC # BLD: 4.26 M/UL — SIGNIFICANT CHANGE UP (ref 4.2–5.8)
RBC # FLD: 13.4 % — SIGNIFICANT CHANGE UP (ref 10.3–14.5)
SODIUM SERPL-SCNC: 140 MMOL/L — SIGNIFICANT CHANGE UP (ref 135–145)
WBC # BLD: 7.68 K/UL — SIGNIFICANT CHANGE UP (ref 3.8–10.5)
WBC # FLD AUTO: 7.68 K/UL — SIGNIFICANT CHANGE UP (ref 3.8–10.5)

## 2022-07-28 RX ORDER — METOPROLOL TARTRATE 50 MG
0.5 TABLET ORAL
Qty: 30 | Refills: 0
Start: 2022-07-28 | End: 2022-08-26

## 2022-07-28 RX ORDER — CLOPIDOGREL BISULFATE 75 MG/1
1 TABLET, FILM COATED ORAL
Qty: 30 | Refills: 0
Start: 2022-07-28 | End: 2022-08-26

## 2022-07-28 RX ORDER — OLMESARTAN MEDOXOMIL 5 MG/1
0 TABLET, FILM COATED ORAL
Qty: 0 | Refills: 0 | DISCHARGE

## 2022-07-28 RX ADMIN — CLOPIDOGREL BISULFATE 75 MILLIGRAM(S): 75 TABLET, FILM COATED ORAL at 12:34

## 2022-07-28 RX ADMIN — LOSARTAN POTASSIUM 25 MILLIGRAM(S): 100 TABLET, FILM COATED ORAL at 05:05

## 2022-07-28 RX ADMIN — BUDESONIDE AND FORMOTEROL FUMARATE DIHYDRATE 2 PUFF(S): 160; 4.5 AEROSOL RESPIRATORY (INHALATION) at 09:10

## 2022-07-28 RX ADMIN — Medication 1: at 12:35

## 2022-07-28 RX ADMIN — MONTELUKAST 10 MILLIGRAM(S): 4 TABLET, CHEWABLE ORAL at 09:11

## 2022-07-28 RX ADMIN — Medication 12.5 MILLIGRAM(S): at 05:05

## 2022-07-28 RX ADMIN — ISOSORBIDE MONONITRATE 30 MILLIGRAM(S): 60 TABLET, EXTENDED RELEASE ORAL at 12:24

## 2022-07-28 RX ADMIN — AMIODARONE HYDROCHLORIDE 200 MILLIGRAM(S): 400 TABLET ORAL at 05:06

## 2022-07-28 RX ADMIN — PANTOPRAZOLE SODIUM 40 MILLIGRAM(S): 20 TABLET, DELAYED RELEASE ORAL at 05:09

## 2022-07-28 RX ADMIN — Medication 50 MILLIGRAM(S): at 12:24

## 2022-07-28 RX ADMIN — Medication 50 MILLIGRAM(S): at 05:05

## 2022-07-28 NOTE — DISCHARGE NOTE PROVIDER - PROVIDER TOKENS
PROVIDER:[TOKEN:[[id number]]] PROVIDER:[TOKEN:[[id number]]],PROVIDER:[TOKEN:[[id number]]]

## 2022-07-28 NOTE — DISCHARGE NOTE PROVIDER - HOSPITAL COURSE
86 y.o. Mohawk speaking male with PMH of A. Fib (on Xarelto), DM II, COPD, KENRICK on CPAP, RCC s/p right nephrectomy, CKD transfer from [facility] s/p PCI 7/26 PAUL x 1 to RCA    CAD s/p PAUL x 1 to RCA  - ASA, Plavix, and started on Xarelto   - Continue Lopressor 12.5mg Q12H  - Continue Atorvastatin 40mg Q12H    Afib  - Xarelto   - Continue Lopressor as above     #CKD - s/p post-cath IVF   -Nephrology consulted   - no e/o JEFF at this time. s/p mucomyst 1200mg po bid pre and post cath and ivf/NS      Case discussed with attending Dr. Reyes on 7/28/22 and patient is medically cleared for discharge. Refills of medications sent to pharmacy as needed.     86 y.o. Kiswahili speaking male with PMH of A. Fib (on Xarelto), DM II, COPD, KENRICK on CPAP, RCC s/p right nephrectomy, CKD transfer from [facility] s/p PCI 7/26 PAUL x 1 to RCA    CAD s/p PAUL x 1 to RCA  - on Xarelto and Plavix  - Continue Lopressor 12.5mg Q12H  - Continue Atorvastatin 40mg Q12H    Afib  - Xarelto   - Continue Lopressor as above     #CKD - s/p post-cath IVF   -Nephrology consulted   - no e/o JEFF at this time. s/p mucomyst 1200mg po bid pre and post cath and ivf/NS    Case discussed with attending Dr. Reyes on 7/28/22 and patient is medically cleared for discharge. Refills of medications sent to pharmacy as needed.

## 2022-07-28 NOTE — CHART NOTE - NSCHARTNOTEFT_GEN_A_CORE
87M afib, DM2, CKD presented from OSH for LHC, now s/p PAUL x1 to RCA on 7/26.     -Resume Plavix 75mg QD and Xarelto.  -Resume Imdur, Losartan, Atorvastatin, hydralazine, and metoprolol as ordered.   -To follow up with Cardiologist John.     Angela Kam MD  Cardiology Fellow - PGY 5  For all New Consults and Questions:  [email or web address]   Login: [email or web address]

## 2022-07-28 NOTE — PHYSICAL THERAPY INITIAL EVALUATION ADULT - PERTINENT HX OF CURRENT PROBLEM, REHAB EVAL
86 year old Arabic speaking male with PMH of YINA Waters (on Xarelto), DM II, COPD, KENRICK on CPAP, RCC s/p right nephrectomy, CKD – was admitted to [facility] on 7/18/22 c/o shortness of breath with exertion getting progressively worse. Pt admits to pressure like, midsternal chest discomfort.

## 2022-07-28 NOTE — DISCHARGE NOTE NURSING/CASE MANAGEMENT/SOCIAL WORK - PATIENT PORTAL LINK FT
You can access the FollowMyHealth Patient Portal offered by [facility] by registering at the following website: [email or web address]. By joining [employer]’s FollowMyHealth portal, you will also be able to view your health information using other applications (apps) compatible with our system.

## 2022-07-28 NOTE — CHART NOTE - NSCHARTNOTESELECT_GEN_ALL_CORE
Radial band removal/Event Note
ACP/Event Note
Discharge/Event Note
Event Note
Rt femoral site check/Event Note

## 2022-07-28 NOTE — DISCHARGE NOTE PROVIDER - NSDCCPCAREPLAN_GEN_ALL_CORE_FT
PRINCIPAL DISCHARGE DIAGNOSIS  Diagnosis: CAD (coronary artery disease)  Assessment and Plan of Treatment: During your stay you went to the cath lab and had a stent placed in your heart. Continue with your plavix and eliquis after discharge. You were also started on a blood pressure medication called metoprolol. Follow up with your PCP and cardiologist after discharge for further monitoring.      SECONDARY DISCHARGE DIAGNOSES  Diagnosis: Afib  Assessment and Plan of Treatment: During your stay at [facility], it was determined you were in afib, you were started on a blood thinner and a medication to help with your heart rate. Continue taking metoprolol twice a day after discharge.    Diagnosis: Chronic kidney disease, unspecified CKD stage  Assessment and Plan of Treatment: During your stay nephrology was consulted, your kidney function was monitored and you were given medication before and after your cath to prevent kidney injury.     PRINCIPAL DISCHARGE DIAGNOSIS  Diagnosis: CAD (coronary artery disease)  Assessment and Plan of Treatment: During your stay you went to the cath lab and had a stent placed in your heart. Continue with your plavix and eliquis after discharge. You were also started on a blood pressure medication called metoprolol. Follow up with your PCP and cardiologist after discharge for further monitoring.      SECONDARY DISCHARGE DIAGNOSES  Diagnosis: Afib  Assessment and Plan of Treatment: During your stay at [facility], it was determined you were in afib, you were started on a blood thinner and a medication to help with your heart rate. Continue taking metoprolol twice a day after discharge.    Diagnosis: Chronic kidney disease, unspecified CKD stage  Assessment and Plan of Treatment: During your stay nephrology was consulted, your kidney function was monitored and you were given medication before and after your cath to prevent kidney injury.    Diagnosis: HTN (hypertension)  Assessment and Plan of Treatment: Continue blood pressure medication regimen as directed. Monitor for any visual changes, headaches or dizziness.  Monitor blood pressure regularly.  Follow up with your primary care provider for further management for high blood pressure.       PRINCIPAL DISCHARGE DIAGNOSIS  Diagnosis: CAD (coronary artery disease)  Assessment and Plan of Treatment: During your stay you went to the cath lab and had a stent placed in your heart. Continue with your plavix and eliquis after discharge. You were also started on a blood pressure medication called metoprolol. Follow up with your PCP and cardiologist after discharge for further monitoring.      SECONDARY DISCHARGE DIAGNOSES  Diagnosis: Afib  Assessment and Plan of Treatment: During your stay at [facility], it was determined you were in afib, you were started on a blood thinner and a medication to help with your heart rate. Continue taking metoprolol twice a day after discharge.    Diagnosis: Chronic kidney disease, unspecified CKD stage  Assessment and Plan of Treatment: During your stay nephrology was consulted, your kidney function was monitored and you were given medication before and after your cath to prevent kidney injury.  Your Cr at discharge was 1.6    Diagnosis: HTN (hypertension)  Assessment and Plan of Treatment: Continue blood pressure medication regimen as directed. Monitor for any visual changes, headaches or dizziness.  Monitor blood pressure regularly.  Follow up with your primary care provider for further management for high blood pressure.

## 2022-07-28 NOTE — CHART NOTE - NSCHARTNOTEFT_GEN_A_CORE
Spoke with cardiology fellow who confirmed medications to be continued on DC  - no need for amiodarone, will continue with plavix / eliquis / metoprolol  no further changes to home medications    Spoke with nephrology who is ok with patient being discharged with outpt follow up    Ninfa Paz PA-C  [id number]

## 2022-07-28 NOTE — DISCHARGE NOTE PROVIDER - NSDCFUADDAPPT_GEN_ALL_CORE_FT
PCP in 1-2 weeks  Cardiology 1-2 weeks  PCP in 1-2 weeks  Your Cardiologist 1-2 weeks  PCP in 1-2 weeks  Your Cardiologist 1-2 weeks   Your nephrologist in 1-2 weeks or Dr. Bhat

## 2022-07-28 NOTE — DISCHARGE NOTE PROVIDER - CARE PROVIDER_API CALL
Eva Almeida)  Internal Medicine  [address] [place] [address]  Phone: [phone]  Fax: [phone]  Follow Up Time:    Eva Almeida)  Internal Medicine  [address] [place] [address]  Phone: [phone]  Fax: [phone]  Follow Up Time:     Maryjane Bhat  INTERNAL MEDICINE  [address] [place] [address]  Phone: [phone]  Fax: [phone]  Follow Up Time:

## 2022-07-28 NOTE — PHYSICAL THERAPY INITIAL EVALUATION ADULT - GENERAL OBSERVATIONS, REHAB EVAL
Pt found in bed; +telemetry monitor; [place] speaking with son at bedside and able to translate for patient; patient agreeable to PT.

## 2022-07-28 NOTE — DISCHARGE NOTE PROVIDER - NSDCMRMEDTOKEN_GEN_ALL_CORE_FT
amLODIPine 5 mg oral tablet: 1 tab(s) orally once a day  atorvastatin 10 mg oral tablet: 1 tab(s) orally once a day (at bedtime)  Breo Ellipta 100 mcg-25 mcg/inh inhalation powder: 1 puff(s) inhaled once a day  Farxiga 10 mg oral tablet: 1 tab(s) orally once a day  hydrALAZINE 50 mg oral tablet: 1 tab(s) orally 3 times a day  Januvia 50 mg oral tablet: 1 tab(s) orally once a day  meloxicam 7.5 mg oral tablet: 1 tab(s) orally once a day  montelukast 10 mg oral tablet: 1 tab(s) orally once a day  olmesartan:   Symbicort 80 mcg-4.5 mcg/inh inhalation aerosol: 2 puff(s) inhaled 2 times a day  tamsulosin 0.4 mg oral capsule: 1 cap(s) orally once a day  Vascepa 1 g oral capsule: 1  orally once a day  Xarelto 15 mg oral tablet: 1 tab(s) orally once a day (in the evening)   amLODIPine 5 mg oral tablet: 1 tab(s) orally once a day  atorvastatin 10 mg oral tablet: 1 tab(s) orally once a day (at bedtime)  Breo Ellipta 100 mcg-25 mcg/inh inhalation powder: 1 puff(s) inhaled once a day  clopidogrel 75 mg oral tablet: 1 tab(s) orally once a day  Farxiga 10 mg oral tablet: 1 tab(s) orally once a day  hydrALAZINE 50 mg oral tablet: 1 tab(s) orally 3 times a day  Januvia 50 mg oral tablet: 1 tab(s) orally once a day  meloxicam 7.5 mg oral tablet: 1 tab(s) orally once a day  Metoprolol Tartrate 25 mg oral tablet: 0.5 tab(s) orally 2 times a day   montelukast 10 mg oral tablet: 1 tab(s) orally once a day  olmesartan:   Symbicort 80 mcg-4.5 mcg/inh inhalation aerosol: 2 puff(s) inhaled 2 times a day  tamsulosin 0.4 mg oral capsule: 1 cap(s) orally once a day  Vascepa 1 g oral capsule: 1  orally once a day  Xarelto 15 mg oral tablet: 1 tab(s) orally once a day (in the evening)   amLODIPine 5 mg oral tablet: 1 tab(s) orally once a day  atorvastatin 10 mg oral tablet: 1 tab(s) orally once a day (at bedtime)  Breo Ellipta 100 mcg-25 mcg/inh inhalation powder: 1 puff(s) inhaled once a day  clopidogrel 75 mg oral tablet: 1 tab(s) orally once a day  Farxiga 10 mg oral tablet: 1 tab(s) orally once a day  hydrALAZINE 50 mg oral tablet: 1 tab(s) orally 3 times a day  Januvia 50 mg oral tablet: 1 tab(s) orally once a day  meloxicam 7.5 mg oral tablet: 1 tab(s) orally once a day  Metoprolol Tartrate 25 mg oral tablet: 0.5 tab(s) orally 2 times a day   montelukast 10 mg oral tablet: 1 tab(s) orally once a day  Symbicort 80 mcg-4.5 mcg/inh inhalation aerosol: 2 puff(s) inhaled 2 times a day  tamsulosin 0.4 mg oral capsule: 1 cap(s) orally once a day  Vascepa 1 g oral capsule: 1  orally once a day  Xarelto 15 mg oral tablet: 1 tab(s) orally once a day (in the evening)

## 2022-11-18 ENCOUNTER — INPATIENT (INPATIENT)
Facility: HOSPITAL | Age: 87
LOS: 3 days | Discharge: HOME | DRG: 191 | End: 2022-11-22
Attending: INTERNAL MEDICINE | Admitting: INTERNAL MEDICINE
Payer: MEDICARE

## 2022-11-18 VITALS
TEMPERATURE: 98 F | HEIGHT: 70.08 IN | WEIGHT: 251.33 LBS | DIASTOLIC BLOOD PRESSURE: 90 MMHG | SYSTOLIC BLOOD PRESSURE: 170 MMHG | HEART RATE: 99 BPM | OXYGEN SATURATION: 96 % | RESPIRATION RATE: 20 BRPM

## 2022-11-18 DIAGNOSIS — I48.91 UNSPECIFIED ATRIAL FIBRILLATION: ICD-10-CM

## 2022-11-18 DIAGNOSIS — Z90.5 ACQUIRED ABSENCE OF KIDNEY: Chronic | ICD-10-CM

## 2022-11-18 DIAGNOSIS — N17.9 ACUTE KIDNEY FAILURE, UNSPECIFIED: ICD-10-CM

## 2022-11-18 DIAGNOSIS — E78.5 HYPERLIPIDEMIA, UNSPECIFIED: ICD-10-CM

## 2022-11-18 DIAGNOSIS — E11.9 TYPE 2 DIABETES MELLITUS WITHOUT COMPLICATIONS: ICD-10-CM

## 2022-11-18 DIAGNOSIS — R09.89 OTHER SPECIFIED SYMPTOMS AND SIGNS INVOLVING THE CIRCULATORY AND RESPIRATORY SYSTEMS: ICD-10-CM

## 2022-11-18 DIAGNOSIS — N40.0 BENIGN PROSTATIC HYPERPLASIA WITHOUT LOWER URINARY TRACT SYMPTOMS: ICD-10-CM

## 2022-11-18 DIAGNOSIS — G47.33 OBSTRUCTIVE SLEEP APNEA (ADULT) (PEDIATRIC): ICD-10-CM

## 2022-11-18 DIAGNOSIS — R06.02 SHORTNESS OF BREATH: ICD-10-CM

## 2022-11-18 DIAGNOSIS — I10 ESSENTIAL (PRIMARY) HYPERTENSION: ICD-10-CM

## 2022-11-18 DIAGNOSIS — Z90.49 ACQUIRED ABSENCE OF OTHER SPECIFIED PARTS OF DIGESTIVE TRACT: Chronic | ICD-10-CM

## 2022-11-18 DIAGNOSIS — I50.9 HEART FAILURE, UNSPECIFIED: ICD-10-CM

## 2022-11-18 DIAGNOSIS — Z29.9 ENCOUNTER FOR PROPHYLACTIC MEASURES, UNSPECIFIED: ICD-10-CM

## 2022-11-18 PROBLEM — C64.9 MALIGNANT NEOPLASM OF UNSPECIFIED KIDNEY, EXCEPT RENAL PELVIS: Chronic | Status: ACTIVE | Noted: 2022-07-25

## 2022-11-18 PROBLEM — I48.20 CHRONIC ATRIAL FIBRILLATION, UNSPECIFIED: Chronic | Status: ACTIVE | Noted: 2022-07-25

## 2022-11-18 PROBLEM — N18.9 CHRONIC KIDNEY DISEASE, UNSPECIFIED: Chronic | Status: ACTIVE | Noted: 2022-07-25

## 2022-11-18 LAB
ALBUMIN SERPL ELPH-MCNC: 3.4 G/DL — LOW (ref 3.5–5)
ALP SERPL-CCNC: 66 U/L — SIGNIFICANT CHANGE UP (ref 40–120)
ALT FLD-CCNC: 24 U/L DA — SIGNIFICANT CHANGE UP (ref 10–60)
ANION GAP SERPL CALC-SCNC: 6 MMOL/L — SIGNIFICANT CHANGE UP (ref 5–17)
APTT BLD: 31.4 SEC — SIGNIFICANT CHANGE UP (ref 27.5–35.5)
AST SERPL-CCNC: 18 U/L — SIGNIFICANT CHANGE UP (ref 10–40)
BASOPHILS # BLD AUTO: 0.03 K/UL — SIGNIFICANT CHANGE UP (ref 0–0.2)
BASOPHILS NFR BLD AUTO: 0.5 % — SIGNIFICANT CHANGE UP (ref 0–2)
BILIRUB SERPL-MCNC: 0.6 MG/DL — SIGNIFICANT CHANGE UP (ref 0.2–1.2)
BUN SERPL-MCNC: 19 MG/DL — HIGH (ref 7–18)
CALCIUM SERPL-MCNC: 9.1 MG/DL — SIGNIFICANT CHANGE UP (ref 8.4–10.5)
CHLORIDE SERPL-SCNC: 103 MMOL/L — SIGNIFICANT CHANGE UP (ref 96–108)
CO2 SERPL-SCNC: 30 MMOL/L — SIGNIFICANT CHANGE UP (ref 22–31)
CREAT SERPL-MCNC: 1.88 MG/DL — HIGH (ref 0.5–1.3)
D DIMER BLD IA.RAPID-MCNC: 181 NG/ML DDU — SIGNIFICANT CHANGE UP
EGFR: 35 ML/MIN/1.73M2 — LOW
EOSINOPHIL # BLD AUTO: 0.07 K/UL — SIGNIFICANT CHANGE UP (ref 0–0.5)
EOSINOPHIL NFR BLD AUTO: 1.1 % — SIGNIFICANT CHANGE UP (ref 0–6)
GLUCOSE BLDC GLUCOMTR-MCNC: 101 MG/DL — HIGH (ref 70–99)
GLUCOSE SERPL-MCNC: 132 MG/DL — HIGH (ref 70–99)
HCT VFR BLD CALC: 48.2 % — SIGNIFICANT CHANGE UP (ref 39–50)
HGB BLD-MCNC: 15.7 G/DL — SIGNIFICANT CHANGE UP (ref 13–17)
IMM GRANULOCYTES NFR BLD AUTO: 0.9 % — SIGNIFICANT CHANGE UP (ref 0–0.9)
INR BLD: 1.27 RATIO — HIGH (ref 0.88–1.16)
LYMPHOCYTES # BLD AUTO: 1.13 K/UL — SIGNIFICANT CHANGE UP (ref 1–3.3)
LYMPHOCYTES # BLD AUTO: 17 % — SIGNIFICANT CHANGE UP (ref 13–44)
MCHC RBC-ENTMCNC: 32 PG — SIGNIFICANT CHANGE UP (ref 27–34)
MCHC RBC-ENTMCNC: 32.6 GM/DL — SIGNIFICANT CHANGE UP (ref 32–36)
MCV RBC AUTO: 98.4 FL — SIGNIFICANT CHANGE UP (ref 80–100)
MONOCYTES # BLD AUTO: 0.73 K/UL — SIGNIFICANT CHANGE UP (ref 0–0.9)
MONOCYTES NFR BLD AUTO: 11 % — SIGNIFICANT CHANGE UP (ref 2–14)
NEUTROPHILS # BLD AUTO: 4.64 K/UL — SIGNIFICANT CHANGE UP (ref 1.8–7.4)
NEUTROPHILS NFR BLD AUTO: 69.5 % — SIGNIFICANT CHANGE UP (ref 43–77)
NRBC # BLD: 0 /100 WBCS — SIGNIFICANT CHANGE UP (ref 0–0)
NT-PROBNP SERPL-SCNC: 1008 PG/ML — HIGH (ref 0–450)
PLATELET # BLD AUTO: 180 K/UL — SIGNIFICANT CHANGE UP (ref 150–400)
POTASSIUM SERPL-MCNC: 4.2 MMOL/L — SIGNIFICANT CHANGE UP (ref 3.5–5.3)
POTASSIUM SERPL-SCNC: 4.2 MMOL/L — SIGNIFICANT CHANGE UP (ref 3.5–5.3)
PROT SERPL-MCNC: 7.2 G/DL — SIGNIFICANT CHANGE UP (ref 6–8.3)
PROTHROM AB SERPL-ACNC: 15.1 SEC — HIGH (ref 10.5–13.4)
RBC # BLD: 4.9 M/UL — SIGNIFICANT CHANGE UP (ref 4.2–5.8)
RBC # FLD: 13.6 % — SIGNIFICANT CHANGE UP (ref 10.3–14.5)
SARS-COV-2 RNA SPEC QL NAA+PROBE: SIGNIFICANT CHANGE UP
SODIUM SERPL-SCNC: 139 MMOL/L — SIGNIFICANT CHANGE UP (ref 135–145)
TROPONIN I, HIGH SENSITIVITY RESULT: 24.2 NG/L — SIGNIFICANT CHANGE UP
WBC # BLD: 6.66 K/UL — SIGNIFICANT CHANGE UP (ref 3.8–10.5)
WBC # FLD AUTO: 6.66 K/UL — SIGNIFICANT CHANGE UP (ref 3.8–10.5)

## 2022-11-18 PROCEDURE — 71045 X-RAY EXAM CHEST 1 VIEW: CPT | Mod: 26

## 2022-11-18 PROCEDURE — 99285 EMERGENCY DEPT VISIT HI MDM: CPT

## 2022-11-18 PROCEDURE — 93970 EXTREMITY STUDY: CPT | Mod: 26

## 2022-11-18 RX ORDER — CLOPIDOGREL BISULFATE 75 MG/1
75 TABLET, FILM COATED ORAL DAILY
Refills: 0 | Status: DISCONTINUED | OUTPATIENT
Start: 2022-11-19 | End: 2022-11-22

## 2022-11-18 RX ORDER — HYDRALAZINE HCL 50 MG
50 TABLET ORAL THREE TIMES A DAY
Refills: 0 | Status: DISCONTINUED | OUTPATIENT
Start: 2022-11-18 | End: 2022-11-22

## 2022-11-18 RX ORDER — BUDESONIDE AND FORMOTEROL FUMARATE DIHYDRATE 160; 4.5 UG/1; UG/1
2 AEROSOL RESPIRATORY (INHALATION)
Refills: 0 | Status: DISCONTINUED | OUTPATIENT
Start: 2022-11-18 | End: 2022-11-22

## 2022-11-18 RX ORDER — AMLODIPINE BESYLATE 2.5 MG/1
1 TABLET ORAL
Qty: 0 | Refills: 0 | DISCHARGE

## 2022-11-18 RX ORDER — BUDESONIDE AND FORMOTEROL FUMARATE DIHYDRATE 160; 4.5 UG/1; UG/1
2 AEROSOL RESPIRATORY (INHALATION)
Qty: 0 | Refills: 0 | DISCHARGE

## 2022-11-18 RX ORDER — RIVAROXABAN 15 MG-20MG
1 KIT ORAL
Qty: 0 | Refills: 0 | DISCHARGE

## 2022-11-18 RX ORDER — INSULIN LISPRO 100/ML
VIAL (ML) SUBCUTANEOUS
Refills: 0 | Status: DISCONTINUED | OUTPATIENT
Start: 2022-11-18 | End: 2022-11-22

## 2022-11-18 RX ORDER — MONTELUKAST 4 MG/1
1 TABLET, CHEWABLE ORAL
Qty: 0 | Refills: 0 | DISCHARGE

## 2022-11-18 RX ORDER — HYDRALAZINE HCL 50 MG
1 TABLET ORAL
Qty: 0 | Refills: 0 | DISCHARGE

## 2022-11-18 RX ORDER — DAPAGLIFLOZIN 10 MG/1
1 TABLET, FILM COATED ORAL
Qty: 0 | Refills: 0 | DISCHARGE

## 2022-11-18 RX ORDER — ALBUTEROL 90 UG/1
2 AEROSOL, METERED ORAL EVERY 6 HOURS
Refills: 0 | Status: DISCONTINUED | OUTPATIENT
Start: 2022-11-18 | End: 2022-11-22

## 2022-11-18 RX ORDER — ATORVASTATIN CALCIUM 80 MG/1
10 TABLET, FILM COATED ORAL AT BEDTIME
Refills: 0 | Status: DISCONTINUED | OUTPATIENT
Start: 2022-11-18 | End: 2022-11-22

## 2022-11-18 RX ORDER — ACETAMINOPHEN 500 MG
650 TABLET ORAL EVERY 6 HOURS
Refills: 0 | Status: DISCONTINUED | OUTPATIENT
Start: 2022-11-18 | End: 2022-11-22

## 2022-11-18 RX ORDER — AMIODARONE HYDROCHLORIDE 400 MG/1
200 TABLET ORAL DAILY
Refills: 0 | Status: DISCONTINUED | OUTPATIENT
Start: 2022-11-18 | End: 2022-11-22

## 2022-11-18 RX ORDER — LANOLIN ALCOHOL/MO/W.PET/CERES
3 CREAM (GRAM) TOPICAL AT BEDTIME
Refills: 0 | Status: DISCONTINUED | OUTPATIENT
Start: 2022-11-18 | End: 2022-11-22

## 2022-11-18 RX ORDER — TAMSULOSIN HYDROCHLORIDE 0.4 MG/1
0.4 CAPSULE ORAL AT BEDTIME
Refills: 0 | Status: DISCONTINUED | OUTPATIENT
Start: 2022-11-18 | End: 2022-11-22

## 2022-11-18 RX ORDER — INSULIN LISPRO 100/ML
VIAL (ML) SUBCUTANEOUS AT BEDTIME
Refills: 0 | Status: DISCONTINUED | OUTPATIENT
Start: 2022-11-18 | End: 2022-11-22

## 2022-11-18 RX ORDER — ATORVASTATIN CALCIUM 80 MG/1
1 TABLET, FILM COATED ORAL
Qty: 0 | Refills: 0 | DISCHARGE

## 2022-11-18 RX ORDER — METOPROLOL TARTRATE 50 MG
12.5 TABLET ORAL
Refills: 0 | Status: DISCONTINUED | OUTPATIENT
Start: 2022-11-18 | End: 2022-11-22

## 2022-11-18 RX ORDER — AMIODARONE HYDROCHLORIDE 400 MG/1
1 TABLET ORAL
Qty: 0 | Refills: 0 | DISCHARGE

## 2022-11-18 RX ORDER — TAMSULOSIN HYDROCHLORIDE 0.4 MG/1
1 CAPSULE ORAL
Qty: 0 | Refills: 0 | DISCHARGE

## 2022-11-18 RX ORDER — FLUTICASONE FUROATE AND VILANTEROL TRIFENATATE 100; 25 UG/1; UG/1
1 POWDER RESPIRATORY (INHALATION)
Qty: 0 | Refills: 0 | DISCHARGE

## 2022-11-18 RX ORDER — SITAGLIPTIN 50 MG/1
1 TABLET, FILM COATED ORAL
Qty: 0 | Refills: 0 | DISCHARGE

## 2022-11-18 RX ORDER — RIVAROXABAN 15 MG-20MG
15 KIT ORAL
Refills: 0 | Status: DISCONTINUED | OUTPATIENT
Start: 2022-11-18 | End: 2022-11-22

## 2022-11-18 RX ORDER — AMLODIPINE BESYLATE 2.5 MG/1
5 TABLET ORAL DAILY
Refills: 0 | Status: DISCONTINUED | OUTPATIENT
Start: 2022-11-18 | End: 2022-11-22

## 2022-11-18 RX ORDER — ONDANSETRON 8 MG/1
4 TABLET, FILM COATED ORAL EVERY 8 HOURS
Refills: 0 | Status: DISCONTINUED | OUTPATIENT
Start: 2022-11-18 | End: 2022-11-22

## 2022-11-18 RX ORDER — ICOSAPENT ETHYL 500 MG/1
1 CAPSULE, LIQUID FILLED ORAL
Qty: 0 | Refills: 0 | DISCHARGE

## 2022-11-18 RX ADMIN — ATORVASTATIN CALCIUM 10 MILLIGRAM(S): 80 TABLET, FILM COATED ORAL at 23:44

## 2022-11-18 RX ADMIN — RIVAROXABAN 15 MILLIGRAM(S): KIT at 23:44

## 2022-11-18 NOTE — H&P ADULT - NSHPPHYSICALEXAM_GEN_ALL_CORE
T(C): 36.6 (11-18-22 @ 20:30), Max: 36.6 (11-18-22 @ 20:30)  HR: 82 (11-18-22 @ 20:30) (78 - 99)  BP: 152/81 (11-18-22 @ 20:30) (144/79 - 170/90)  RR: 18 (11-18-22 @ 20:30) (18 - 20)  SpO2: 95% (11-18-22 @ 20:30) (94% - 96%)    GENERAL: patient appears well, NAD, pleasant  HEAD: normocephalic, atraumatic  EYES: sclera clear, no exudates  ENMT: oropharynx clear without erythema, no exudates, moist mucous membranes  NECK: supple, soft, no thyromegaly noted  LUNGS: +crackles and decreased breath sounds b/l bases, no wheezing  HEART: S1/S2, +irregular, no murmurs noted, 1+ b/l lower extremity edema  GASTROINTESTINAL: abdomen is soft, nondistended, nontender, normoactive bowel sounds, no palpable masses  INTEGUMENT: good skin turgor, no lesions noted  MUSCULOSKELETAL: no clubbing or cyanosis, no obvious deformity  NEUROLOGIC: AAO x3, CNII-XII intact, no obvious sensorimotor deficits noted

## 2022-11-18 NOTE — H&P ADULT - ASSESSMENT
87M with PMHx HTN, HLD, DM2, CKD ,COPD, Afib, CAD (s/p stent 7/22) p/w cough and LOPEZ and sent by PCP for suspicion of PE. PE less likely. Admit for CHF exacerbation.

## 2022-11-18 NOTE — ED PROVIDER NOTE - CLINICAL SUMMARY MEDICAL DECISION MAKING FREE TEXT BOX
87-year-old male hx of HTN, HLD, DM2, CKD ,COPD, Afib, presenting with cough and dyspnea on exertion x 1 week - will check labs, ECG, CXR, CTPE, DVT US, reassess. Anticipate admission.

## 2022-11-18 NOTE — ED PROVIDER NOTE - MUSCULOSKELETAL, MLM
Spine appears normal, range of motion is not limited, no muscle or joint tenderness. 1+ edema of LLE.

## 2022-11-18 NOTE — H&P ADULT - ATTENDING COMMENTS
87-year-old male hx of HTN, HLD, DM2, CKD ,COPD, Afib, CAD (s/p stent 7/22) presenting with cough and dyspnea on exertion x 1 week. Has leg swelling as well worse on the left leg. Denies chest pain. No other symptoms. Saw Dr. Monique his PMD who is concerned for possible PE, sent him to ER.    In ED:  VS afebrile, 170/90 BP, 99 HR, 20 RR SpO2 99% on RA  labs wnl   EKG: rate controlled Afib  CXR: no pulmonary disease  US doppler LE: no DVT         assessment   --- harris, uncontrolled htn, r/o acs, r/o chf, r/o pe, poss copd exacerb, h/o HTN, HLD, DM2, CKD ,COPD, Afib, CAD (s/p stent 7/22)     plan  --  adm to tele, acs protocol, aspirin, statin, lopressor, solumedrol, albuterol q6 prn, lispro ss, supplemental O2 prn, cont preadmit home meds, gi and dvt prophylaxis  cbc, bmp, mg, phos, lipid, tsh, ce q8 x3, d dimer, hgba1c    echo    cardio cons  pulm cons

## 2022-11-18 NOTE — H&P ADULT - PROBLEM SELECTOR PLAN 2
pt with solitary kidney  Cr 1.88 on admission  CTA deferred as above   f/u urine lytes  hold fluids for suspicion of chf exacerbation  monitor BMP

## 2022-11-18 NOTE — H&P ADULT - NSICDXPASTMEDICALHX_GEN_ALL_CORE_FT
PAST MEDICAL HISTORY:  BPH (benign prostatic hyperplasia)     CAD (coronary artery disease)     Chronic atrial fibrillation on Xarelto    CKD (chronic kidney disease)     DM2 (diabetes mellitus, type 2)     H/O: HTN (hypertension)     HLD (hyperlipidemia)     KENRICK and COPD overlap syndrome     Renal cell cancer s/p R nephrectomy 7 years ago

## 2022-11-18 NOTE — ED PROVIDER NOTE - PROGRESS NOTE DETAILS
Labs with elevated creatinine, BNP 1000  CTPE deferred given GREGORY and pt only has 1 kidney  Admit for VQ scan/inpatient echo.

## 2022-11-18 NOTE — H&P ADULT - NSHPSOCIALHISTORY_GEN_ALL_CORE
Patient is an obese male, Hx of smoking 40 years ago and quit  ambulates with no assistance  lives at home with family Patient lives at home with family, ambulates indepenedently Patient lives at home with family, ambulates independently

## 2022-11-18 NOTE — ED PROVIDER NOTE - OBJECTIVE STATEMENT
87-year-old male hx of HTN, HLD, DM2, CKD ,COPD, Afib, presenting with cough and dyspnea on exertion x 1 week. Has leg swelling as well worse on the left leg. Denies chest pain. No other symptoms. Saw Dr. Monique his PMD who is concerned for possible PE, sent him to ER.

## 2022-11-18 NOTE — H&P ADULT - PROBLEM SELECTOR PLAN 5
on ICS at home as well CPAP  Dr. Monique consulted  c/w CPAP, Albuterol PRN, symbicort on ICS at home as well CPAP  pt saturating well, no wheezing though increased sputum  no suspicion of COPD exacerbation at this time  c/w nightly CPAP, Albuterol PRN, symbicort  Dr. Monique consulted

## 2022-11-18 NOTE — H&P ADULT - HISTORY OF PRESENT ILLNESS
87-year-old male hx of HTN, HLD, DM2, CKD ,COPD, Afib, CAD (s/p stent 7/22) presenting with cough and dyspnea on exertion x 1 week. Has leg swelling as well worse on the left leg. Denies chest pain. No other symptoms. Saw Dr. Monique his PMD who is concerned for possible PE, sent him to ER.    In ED:  VS afebrile, 170/90 BP, 99 HR, 20 RR SpO2 99% on RA  labs wnl   EKG: rate controlled Afib  CXR: no pulmonary disease  US doppler LE: no DVT  87-year-old male hx of HTN, HLD, DM2, CKD ,COPD, Afib, CAD (s/p stent 7/22) presenting with cough and dyspnea on exertion x 2 weeks. Patient interviewed with daughter at bedside for collateral. Patient endorses flu-like sypmtoms for the past 2 weeks including cough and phlegm that he has difficulty clearing out. Since then he has had dyspnea on exertion and cannot even walk one block without stopping 3 times to catch his breath or the stairs in his house. Dyspnea is associated with pressurea and heaviness in his chest that is not unlike what he had in the past prior to his cardiac cath, Patient saw his PCP who sent him for evaluation of PE. ROS positive for occasional palpitations and mild leg swelling L>R. Patient denies fever, chills, headache, dizziness, hemoptysis, n/v/d/c,  complaints or rashes.    In ED:  VS afebrile, 170/90 BP, 99 HR, 20 RR SpO2 99% on RA  labs wnl   troponin negx1   EKG: rate controlled Afib  CXR: cardiomegaly, bilateral infiltrates (wet-read)  US doppler LE: no DVT  87-year-old male hx of HTN, HLD, DM2, CKD ,COPD, Afib, CAD (s/p stent 7/22) presenting with cough and progressive dyspnea on exertion x 2 weeks. Patient interviewed with daughter at bedside for collateral. Patient endorses flu-like sypmtoms for the past 2 weeks including cough and phlegm that he has difficulty clearing out. Since then he has had dyspnea on exertion and cannot even walk one block without stopping 3 times to catch his breath. Dyspnea is associated with pressure and heaviness in his chest which reminds of him of his previous symptoms prior to his cardiac cath, Patient saw his PCP who sent him for evaluation of PE. ROS positive for occasional palpitations and mild leg swelling L>R. Patient denies fever, chills, headache, dizziness, hemoptysis, n/v/d/c,  complaints or rashes. NKDA    In ED:  VS afebrile, 170/90 BP, 99 HR, 20 RR SpO2 99% on RA  labs wnl   troponin negx1   EKG: rate controlled Afib  CXR: cardiomegaly, bilateral infiltrates (wet-read)  US doppler LE: no DVT

## 2022-11-18 NOTE — ED ADULT NURSE REASSESSMENT NOTE - NS ED NURSE REASSESS COMMENT FT1
Pt is alert and oriented x3. No sign of acute/respiratory distress noted. Family is at the bedside. Safety precaution maintained.
Pt observed resting lying on stretcher in no acute distress. Breathing on RA 98%.

## 2022-11-18 NOTE — H&P ADULT - PROBLEM SELECTOR PLAN 3
EKG shows rate controlled AFib   on metoprolol and Xarelto at home   c/w home meds EKG shows rate controlled AFib   on metoprolol, amiodarone and Xarelto at home   c/w home meds

## 2022-11-18 NOTE — H&P ADULT - PROBLEM SELECTOR PLAN 1
pt with LOPEZ poor exercise tolerance. was sent for suspicion of PE   ddimer wnl, pt not hyopxic or tachycardic  no DVT on LE doppler  CTA deferred for GREGORY and hx of solitary kidney  CXR shows cardiomegaly and pulmonary edema  leg swelling on exam  Echo 7/22 shows EF 50-55%, moderate LVH, Diastolic function not assessed  admit for CHF exacerbation  Pro-BNP wnl for age  consider trial of diuresis in am  f/u echo  Cardio Nabatian consulted pt with LOPEZ poor exercise tolerance. was sent for suspicion of PE   ddimer wnl, pt not hypoxic or tachycardic, pt on xarelto, no DVT on LE doppler  Wells Score: 0  CTA deferred for GREGORY and hx of solitary kidney  CXR shows cardiomegaly and pulmonary edema (wet-read)  leg swelling on exam  Echo 7/22 shows EF 50-55%, moderate LVH, Diastolic function not assessed  admit for CHF exacerbation  Pro-BNP wnl for age  consider trial of diuresis in am  f/u echo  Cardio Nabatian consulted

## 2022-11-19 LAB
A1C WITH ESTIMATED AVERAGE GLUCOSE RESULT: 6.3 % — HIGH (ref 4–5.6)
ANION GAP SERPL CALC-SCNC: 8 MMOL/L — SIGNIFICANT CHANGE UP (ref 5–17)
BUN SERPL-MCNC: 19 MG/DL — HIGH (ref 7–18)
CALCIUM SERPL-MCNC: 8.9 MG/DL — SIGNIFICANT CHANGE UP (ref 8.4–10.5)
CHLORIDE SERPL-SCNC: 105 MMOL/L — SIGNIFICANT CHANGE UP (ref 96–108)
CO2 SERPL-SCNC: 27 MMOL/L — SIGNIFICANT CHANGE UP (ref 22–31)
CREAT SERPL-MCNC: 1.66 MG/DL — HIGH (ref 0.5–1.3)
EGFR: 40 ML/MIN/1.73M2 — LOW
ESTIMATED AVERAGE GLUCOSE: 134 MG/DL — HIGH (ref 68–114)
GLUCOSE BLDC GLUCOMTR-MCNC: 175 MG/DL — HIGH (ref 70–99)
GLUCOSE BLDC GLUCOMTR-MCNC: 182 MG/DL — HIGH (ref 70–99)
GLUCOSE BLDC GLUCOMTR-MCNC: 200 MG/DL — HIGH (ref 70–99)
GLUCOSE BLDC GLUCOMTR-MCNC: 99 MG/DL — SIGNIFICANT CHANGE UP (ref 70–99)
GLUCOSE SERPL-MCNC: 107 MG/DL — HIGH (ref 70–99)
HCT VFR BLD CALC: 45.4 % — SIGNIFICANT CHANGE UP (ref 39–50)
HGB BLD-MCNC: 15.4 G/DL — SIGNIFICANT CHANGE UP (ref 13–17)
MCHC RBC-ENTMCNC: 32.6 PG — SIGNIFICANT CHANGE UP (ref 27–34)
MCHC RBC-ENTMCNC: 33.9 GM/DL — SIGNIFICANT CHANGE UP (ref 32–36)
MCV RBC AUTO: 96.2 FL — SIGNIFICANT CHANGE UP (ref 80–100)
NRBC # BLD: 0 /100 WBCS — SIGNIFICANT CHANGE UP (ref 0–0)
PLATELET # BLD AUTO: 175 K/UL — SIGNIFICANT CHANGE UP (ref 150–400)
POTASSIUM SERPL-MCNC: 3.7 MMOL/L — SIGNIFICANT CHANGE UP (ref 3.5–5.3)
POTASSIUM SERPL-SCNC: 3.7 MMOL/L — SIGNIFICANT CHANGE UP (ref 3.5–5.3)
RBC # BLD: 4.72 M/UL — SIGNIFICANT CHANGE UP (ref 4.2–5.8)
RBC # FLD: 13.3 % — SIGNIFICANT CHANGE UP (ref 10.3–14.5)
SODIUM SERPL-SCNC: 140 MMOL/L — SIGNIFICANT CHANGE UP (ref 135–145)
TROPONIN I, HIGH SENSITIVITY RESULT: 56.2 NG/L — SIGNIFICANT CHANGE UP
WBC # BLD: 5.46 K/UL — SIGNIFICANT CHANGE UP (ref 3.8–10.5)
WBC # FLD AUTO: 5.46 K/UL — SIGNIFICANT CHANGE UP (ref 3.8–10.5)

## 2022-11-19 RX ORDER — ISOSORBIDE MONONITRATE 60 MG/1
30 TABLET, EXTENDED RELEASE ORAL DAILY
Refills: 0 | Status: DISCONTINUED | OUTPATIENT
Start: 2022-11-19 | End: 2022-11-22

## 2022-11-19 RX ADMIN — Medication 40 MILLIGRAM(S): at 21:45

## 2022-11-19 RX ADMIN — AMLODIPINE BESYLATE 5 MILLIGRAM(S): 2.5 TABLET ORAL at 05:39

## 2022-11-19 RX ADMIN — TAMSULOSIN HYDROCHLORIDE 0.4 MILLIGRAM(S): 0.4 CAPSULE ORAL at 21:38

## 2022-11-19 RX ADMIN — Medication 12.5 MILLIGRAM(S): at 16:51

## 2022-11-19 RX ADMIN — BUDESONIDE AND FORMOTEROL FUMARATE DIHYDRATE 2 PUFF(S): 160; 4.5 AEROSOL RESPIRATORY (INHALATION) at 21:49

## 2022-11-19 RX ADMIN — AMIODARONE HYDROCHLORIDE 200 MILLIGRAM(S): 400 TABLET ORAL at 05:39

## 2022-11-19 RX ADMIN — Medication 12.5 MILLIGRAM(S): at 05:40

## 2022-11-19 RX ADMIN — Medication 50 MILLIGRAM(S): at 21:38

## 2022-11-19 RX ADMIN — Medication 40 MILLIGRAM(S): at 14:36

## 2022-11-19 RX ADMIN — ATORVASTATIN CALCIUM 10 MILLIGRAM(S): 80 TABLET, FILM COATED ORAL at 21:38

## 2022-11-19 RX ADMIN — Medication 1: at 16:50

## 2022-11-19 RX ADMIN — RIVAROXABAN 15 MILLIGRAM(S): KIT at 16:51

## 2022-11-19 RX ADMIN — CLOPIDOGREL BISULFATE 75 MILLIGRAM(S): 75 TABLET, FILM COATED ORAL at 11:41

## 2022-11-19 RX ADMIN — Medication 1: at 12:26

## 2022-11-19 RX ADMIN — Medication 50 MILLIGRAM(S): at 05:39

## 2022-11-19 RX ADMIN — BUDESONIDE AND FORMOTEROL FUMARATE DIHYDRATE 2 PUFF(S): 160; 4.5 AEROSOL RESPIRATORY (INHALATION) at 09:08

## 2022-11-19 RX ADMIN — Medication 50 MILLIGRAM(S): at 14:36

## 2022-11-19 NOTE — PROGRESS NOTE ADULT - SUBJECTIVE AND OBJECTIVE BOX
Patient is a 87y old  Male who presents with a chief complaint of CHF exacerbation (18 Nov 2022 21:20)    pt seen in icu [  ], reg med floor [   ], bed [  ], chair at bedside [   ], a+o x3 [  ], lethargic [  ],  nad [  ]    carlin [  ], ngt [  ], peg [  ], et tube [  ], cent line [  ], picc line [  ]        Allergies    No Known Allergies        Vitals    T(F): 97.4 (11-19-22 @ 04:37), Max: 97.8 (11-18-22 @ 20:30)  HR: 84 (11-19-22 @ 04:37) (75 - 99)  BP: 160/82 (11-19-22 @ 04:37) (144/79 - 170/90)  RR: 18 (11-19-22 @ 04:37) (18 - 20)  SpO2: 94% (11-19-22 @ 04:37) (94% - 96%)  Wt(kg): --  CAPILLARY BLOOD GLUCOSE      POCT Blood Glucose.: 101 mg/dL (18 Nov 2022 23:30)      Labs                          15.4   5.46  )-----------( 175      ( 19 Nov 2022 05:00 )             45.4       11-19    140  |  105  |  19<H>  ----------------------------<  107<H>  3.7   |  27  |  1.66<H>    Ca    8.9      19 Nov 2022 05:00    TPro  7.2  /  Alb  3.4<L>  /  TBili  0.6  /  DBili  x   /  AST  18  /  ALT  24  /  AlkPhos  66  11-18          Troponin I, High Sensitivity Result: 56.2 ng/L (11-19-22 @ 05:00)  Troponin I, High Sensitivity Result: 24.2 ng/L (11-18-22 @ 13:45)        Radiology Results      Meds    MEDICATIONS  (STANDING):  aMIOdarone    Tablet 200 milliGRAM(s) Oral daily  amLODIPine   Tablet 5 milliGRAM(s) Oral daily  atorvastatin 10 milliGRAM(s) Oral at bedtime  budesonide  80 MICROgram(s)/formoterol 4.5 MICROgram(s) Inhaler 2 Puff(s) Inhalation two times a day  clopidogrel Tablet 75 milliGRAM(s) Oral daily  hydrALAZINE 50 milliGRAM(s) Oral three times a day  insulin lispro (ADMELOG) corrective regimen sliding scale   SubCutaneous three times a day before meals  insulin lispro (ADMELOG) corrective regimen sliding scale   SubCutaneous at bedtime  metoprolol tartrate 12.5 milliGRAM(s) Oral two times a day  rivaroxaban 15 milliGRAM(s) Oral with dinner  tamsulosin 0.4 milliGRAM(s) Oral at bedtime      MEDICATIONS  (PRN):  acetaminophen     Tablet .. 650 milliGRAM(s) Oral every 6 hours PRN Temp greater or equal to 38C (100.4F), Mild Pain (1 - 3)  albuterol    90 MICROgram(s) HFA Inhaler 2 Puff(s) Inhalation every 6 hours PRN Shortness of Breath and/or Wheezing  melatonin 3 milliGRAM(s) Oral at bedtime PRN Insomnia  ondansetron Injectable 4 milliGRAM(s) IV Push every 8 hours PRN Nausea and/or Vomiting      Physical Exam    Neuro :  no focal deficits  Respiratory: CTA B/L  CV: RRR, S1S2, no murmurs,   Abdominal: Soft, NT, ND +BS,  Extremities: No edema, + peripheral pulses    ASSESSMENT    harris,   uncontrolled htn,  r/o acs,   r/o chf,   r/o pe,   poss copd exacerb,   h/o HTN,   HLD,   DM2,   CKD ,  COPD,   Afib,   CAD (s/p stent 7/22)       Shortness of breath    Diabetes    Kidney tumor    KENRICK and COPD overlap syndrome    H/O: HTN (hypertension)    Renal cell cancer    DM2 (diabetes mellitus, type 2)    HLD (hyperlipidemia)    CKD (chronic kidney disease)    Chronic atrial fibrillation    BPH without urinary obstruction    BPH (benign prostatic hyperplasia)    CAD (coronary artery disease)    H/O right nephrectomy    S/P cholecystectomy        PLAN    adm to tele,   acs protocol,   aspirin,   statin,   lopressor,   solumedrol,   albuterol q6 prn,   lispro ss,   supplemental O2 prn,   cont preadmit home meds,   gi and dvt prophylaxis  cbc,   bmp,   mg,   phos,   lipid,   tsh,   ce q8 x3,   d dimer, hgba1c    echo    cardio cons  pulm cons.       Patient is a 87y old  Male who presents with a chief complaint of CHF exacerbation (18 Nov 2022 21:20)    pt seen in ed tele [x  ], reg med floor [   ], bed [ x ], chair at bedside [   ], a+o x3 [x  ], lethargic [  ],  nad [ x ]      Allergies    No Known Allergies        Vitals    T(F): 97.4 (11-19-22 @ 04:37), Max: 97.8 (11-18-22 @ 20:30)  HR: 84 (11-19-22 @ 04:37) (75 - 99)  BP: 160/82 (11-19-22 @ 04:37) (144/79 - 170/90)  RR: 18 (11-19-22 @ 04:37) (18 - 20)  SpO2: 94% (11-19-22 @ 04:37) (94% - 96%)  Wt(kg): --  CAPILLARY BLOOD GLUCOSE      POCT Blood Glucose.: 101 mg/dL (18 Nov 2022 23:30)      Labs                          15.4   5.46  )-----------( 175      ( 19 Nov 2022 05:00 )             45.4       11-19    140  |  105  |  19<H>  ----------------------------<  107<H>  3.7   |  27  |  1.66<H>    Ca    8.9      19 Nov 2022 05:00    TPro  7.2  /  Alb  3.4<L>  /  TBili  0.6  /  DBili  x   /  AST  18  /  ALT  24  /  AlkPhos  66  11-18      A1C with Estimated Average Glucose (11.19.22 @ 05:00)   A1C with Estimated Average Glucose Result: 6.3  D-Dimer Assay, Quantitative (11.18.22 @ 21:07)   D-Dimer Assay, Quantitative: 181      Troponin I, High Sensitivity Result: 56.2 ng/L (11-19-22 @ 05:00)  Troponin I, High Sensitivity Result: 24.2 ng/L (11-18-22 @ 13:45)        Radiology Results      Meds    MEDICATIONS  (STANDING):  aMIOdarone    Tablet 200 milliGRAM(s) Oral daily  amLODIPine   Tablet 5 milliGRAM(s) Oral daily  atorvastatin 10 milliGRAM(s) Oral at bedtime  budesonide  80 MICROgram(s)/formoterol 4.5 MICROgram(s) Inhaler 2 Puff(s) Inhalation two times a day  clopidogrel Tablet 75 milliGRAM(s) Oral daily  hydrALAZINE 50 milliGRAM(s) Oral three times a day  insulin lispro (ADMELOG) corrective regimen sliding scale   SubCutaneous three times a day before meals  insulin lispro (ADMELOG) corrective regimen sliding scale   SubCutaneous at bedtime  metoprolol tartrate 12.5 milliGRAM(s) Oral two times a day  rivaroxaban 15 milliGRAM(s) Oral with dinner  tamsulosin 0.4 milliGRAM(s) Oral at bedtime      MEDICATIONS  (PRN):  acetaminophen     Tablet .. 650 milliGRAM(s) Oral every 6 hours PRN Temp greater or equal to 38C (100.4F), Mild Pain (1 - 3)  albuterol    90 MICROgram(s) HFA Inhaler 2 Puff(s) Inhalation every 6 hours PRN Shortness of Breath and/or Wheezing  melatonin 3 milliGRAM(s) Oral at bedtime PRN Insomnia  ondansetron Injectable 4 milliGRAM(s) IV Push every 8 hours PRN Nausea and/or Vomiting      Physical Exam    Neuro :  no focal deficits  Respiratory: posterior wheeze B/L  CV: RRR, S1S2, no murmurs,   Abdominal: Soft, NT, ND +BS,  Extremities: No edema, + peripheral pulses      ASSESSMENT    harris,   uncontrolled htn,  r/o acs,   r/o chf,   r/o pe,   poss copd exacerb,   h/o HTN,   CAD (s/p stent 7/22)   KENRICK and COPD overlap syndrome  DM2 (diabetes mellitus, type 2)  HLD (hyperlipidemia)  CKD (chronic kidney disease)  Chronic atrial fibrillation  BPH without urinary obstruction  Renal cell cancer s/p right nephrectomy  S/P cholecystectomy        PLAN    cont tele,   acs protocol,   aspirin, statin,   trop x2 neg noted above  cardio cons  lopressor,   f/u echo   cont solumedrol,   albuterol q6 prn,   pulm cons   f/u ct chest   supplemental O2 prn,   d dimer neg noted above   lispro ss,   hgba1c 6.3 noted above  cont current meds        Patient is a 87y old  Male who presents with a chief complaint of CHF exacerbation (18 Nov 2022 21:20)    pt seen in ed tele [x  ], reg med floor [   ], bed [ x ], chair at bedside [   ], a+o x3 [x  ], lethargic [  ],  nad [ x ]      Allergies    No Known Allergies        Vitals    T(F): 97.4 (11-19-22 @ 04:37), Max: 97.8 (11-18-22 @ 20:30)  HR: 84 (11-19-22 @ 04:37) (75 - 99)  BP: 160/82 (11-19-22 @ 04:37) (144/79 - 170/90)  RR: 18 (11-19-22 @ 04:37) (18 - 20)  SpO2: 94% (11-19-22 @ 04:37) (94% - 96%)  Wt(kg): --  CAPILLARY BLOOD GLUCOSE      POCT Blood Glucose.: 101 mg/dL (18 Nov 2022 23:30)      Labs                          15.4   5.46  )-----------( 175      ( 19 Nov 2022 05:00 )             45.4       11-19    140  |  105  |  19<H>  ----------------------------<  107<H>  3.7   |  27  |  1.66<H>    Ca    8.9      19 Nov 2022 05:00    TPro  7.2  /  Alb  3.4<L>  /  TBili  0.6  /  DBili  x   /  AST  18  /  ALT  24  /  AlkPhos  66  11-18      A1C with Estimated Average Glucose (11.19.22 @ 05:00)   A1C with Estimated Average Glucose Result: 6.3  D-Dimer Assay, Quantitative (11.18.22 @ 21:07)   D-Dimer Assay, Quantitative: 181      Troponin I, High Sensitivity Result: 56.2 ng/L (11-19-22 @ 05:00)  Troponin I, High Sensitivity Result: 24.2 ng/L (11-18-22 @ 13:45)        Radiology Results      Meds    MEDICATIONS  (STANDING):  aMIOdarone    Tablet 200 milliGRAM(s) Oral daily  amLODIPine   Tablet 5 milliGRAM(s) Oral daily  atorvastatin 10 milliGRAM(s) Oral at bedtime  budesonide  80 MICROgram(s)/formoterol 4.5 MICROgram(s) Inhaler 2 Puff(s) Inhalation two times a day  clopidogrel Tablet 75 milliGRAM(s) Oral daily  hydrALAZINE 50 milliGRAM(s) Oral three times a day  insulin lispro (ADMELOG) corrective regimen sliding scale   SubCutaneous three times a day before meals  insulin lispro (ADMELOG) corrective regimen sliding scale   SubCutaneous at bedtime  metoprolol tartrate 12.5 milliGRAM(s) Oral two times a day  rivaroxaban 15 milliGRAM(s) Oral with dinner  tamsulosin 0.4 milliGRAM(s) Oral at bedtime      MEDICATIONS  (PRN):  acetaminophen     Tablet .. 650 milliGRAM(s) Oral every 6 hours PRN Temp greater or equal to 38C (100.4F), Mild Pain (1 - 3)  albuterol    90 MICROgram(s) HFA Inhaler 2 Puff(s) Inhalation every 6 hours PRN Shortness of Breath and/or Wheezing  melatonin 3 milliGRAM(s) Oral at bedtime PRN Insomnia  ondansetron Injectable 4 milliGRAM(s) IV Push every 8 hours PRN Nausea and/or Vomiting      Physical Exam    Neuro :  no focal deficits  Respiratory: posterior wheeze B/L  CV: RRR, S1S2, no murmurs,   Abdominal: Soft, NT, ND +BS,  Extremities: No edema, + peripheral pulses      ASSESSMENT    harris,   uncontrolled htn,  r/o acs,   r/o chf,   pe r/o,   poss copd exacerb,   h/o HTN,   CAD (s/p stent 7/22)   KENRICK and COPD overlap syndrome  DM2 (diabetes mellitus, type 2)  HLD (hyperlipidemia)  CKD (chronic kidney disease)  Chronic atrial fibrillation  BPH without urinary obstruction  Renal cell cancer s/p right nephrectomy  S/P cholecystectomy        PLAN    cont tele,   acs protocol,   aspirin, statin,   trop x2 neg noted above  cardio cons  lopressor,   f/u echo   cont solumedrol,   albuterol q6 prn,   pulm cons   f/u ct chest   supplemental O2 prn,   d dimer neg noted above   lispro ss,   hgba1c 6.3 noted above  cont current meds

## 2022-11-19 NOTE — PATIENT PROFILE ADULT - FALL HARM RISK - UNIVERSAL INTERVENTIONS
Bed in lowest position, wheels locked, appropriate side rails in place/Call bell, personal items and telephone in reach/Instruct patient to call for assistance before getting out of bed or chair/Non-slip footwear when patient is out of bed/[place] to call system/Physically safe environment - no spills, clutter or unnecessary equipment/Purposeful Proactive Rounding/Room/bathroom lighting operational, light cord in reach

## 2022-11-19 NOTE — CONSULT NOTE ADULT - PROBLEM SELECTOR RECOMMENDATION 9
oxygen supp  Bronchodilators  Steroids  monitor oxygen sat  C-pap machine at night  Weight reduction

## 2022-11-19 NOTE — CONSULT NOTE ADULT - PROBLEM/RECOMMENDATION-4
DISPLAY PLAN FREE TEXT Pounds Preamble Statement (Weight Entered In Details Tab): Reported Weight in pounds:

## 2022-11-20 LAB
ANION GAP SERPL CALC-SCNC: 8 MMOL/L — SIGNIFICANT CHANGE UP (ref 5–17)
BUN SERPL-MCNC: 26 MG/DL — HIGH (ref 7–18)
CALCIUM SERPL-MCNC: 9.6 MG/DL — SIGNIFICANT CHANGE UP (ref 8.4–10.5)
CHLORIDE SERPL-SCNC: 105 MMOL/L — SIGNIFICANT CHANGE UP (ref 96–108)
CO2 SERPL-SCNC: 25 MMOL/L — SIGNIFICANT CHANGE UP (ref 22–31)
CREAT SERPL-MCNC: 1.82 MG/DL — HIGH (ref 0.5–1.3)
EGFR: 36 ML/MIN/1.73M2 — LOW
GLUCOSE BLDC GLUCOMTR-MCNC: 173 MG/DL — HIGH (ref 70–99)
GLUCOSE BLDC GLUCOMTR-MCNC: 192 MG/DL — HIGH (ref 70–99)
GLUCOSE BLDC GLUCOMTR-MCNC: 217 MG/DL — HIGH (ref 70–99)
GLUCOSE BLDC GLUCOMTR-MCNC: 219 MG/DL — HIGH (ref 70–99)
GLUCOSE SERPL-MCNC: 174 MG/DL — HIGH (ref 70–99)
HCT VFR BLD CALC: 51.9 % — HIGH (ref 39–50)
HGB BLD-MCNC: 17.3 G/DL — HIGH (ref 13–17)
MCHC RBC-ENTMCNC: 32.3 PG — SIGNIFICANT CHANGE UP (ref 27–34)
MCHC RBC-ENTMCNC: 33.3 GM/DL — SIGNIFICANT CHANGE UP (ref 32–36)
MCV RBC AUTO: 97 FL — SIGNIFICANT CHANGE UP (ref 80–100)
NRBC # BLD: 0 /100 WBCS — SIGNIFICANT CHANGE UP (ref 0–0)
PLATELET # BLD AUTO: 214 K/UL — SIGNIFICANT CHANGE UP (ref 150–400)
POTASSIUM SERPL-MCNC: 4.3 MMOL/L — SIGNIFICANT CHANGE UP (ref 3.5–5.3)
POTASSIUM SERPL-SCNC: 4.3 MMOL/L — SIGNIFICANT CHANGE UP (ref 3.5–5.3)
RBC # BLD: 5.35 M/UL — SIGNIFICANT CHANGE UP (ref 4.2–5.8)
RBC # FLD: 13.2 % — SIGNIFICANT CHANGE UP (ref 10.3–14.5)
SODIUM SERPL-SCNC: 138 MMOL/L — SIGNIFICANT CHANGE UP (ref 135–145)
WBC # BLD: 8.5 K/UL — SIGNIFICANT CHANGE UP (ref 3.8–10.5)
WBC # FLD AUTO: 8.5 K/UL — SIGNIFICANT CHANGE UP (ref 3.8–10.5)

## 2022-11-20 PROCEDURE — 71250 CT THORAX DX C-: CPT | Mod: 26

## 2022-11-20 RX ADMIN — AMLODIPINE BESYLATE 5 MILLIGRAM(S): 2.5 TABLET ORAL at 06:05

## 2022-11-20 RX ADMIN — Medication 50 MILLIGRAM(S): at 06:05

## 2022-11-20 RX ADMIN — TAMSULOSIN HYDROCHLORIDE 0.4 MILLIGRAM(S): 0.4 CAPSULE ORAL at 21:52

## 2022-11-20 RX ADMIN — ISOSORBIDE MONONITRATE 30 MILLIGRAM(S): 60 TABLET, EXTENDED RELEASE ORAL at 11:20

## 2022-11-20 RX ADMIN — Medication 12.5 MILLIGRAM(S): at 06:07

## 2022-11-20 RX ADMIN — Medication 2: at 16:35

## 2022-11-20 RX ADMIN — Medication 1: at 07:58

## 2022-11-20 RX ADMIN — BUDESONIDE AND FORMOTEROL FUMARATE DIHYDRATE 2 PUFF(S): 160; 4.5 AEROSOL RESPIRATORY (INHALATION) at 21:52

## 2022-11-20 RX ADMIN — ATORVASTATIN CALCIUM 10 MILLIGRAM(S): 80 TABLET, FILM COATED ORAL at 21:52

## 2022-11-20 RX ADMIN — AMIODARONE HYDROCHLORIDE 200 MILLIGRAM(S): 400 TABLET ORAL at 06:05

## 2022-11-20 RX ADMIN — Medication 50 MILLIGRAM(S): at 13:16

## 2022-11-20 RX ADMIN — Medication 50 MILLIGRAM(S): at 21:52

## 2022-11-20 RX ADMIN — Medication 2: at 11:24

## 2022-11-20 RX ADMIN — Medication 12.5 MILLIGRAM(S): at 17:23

## 2022-11-20 RX ADMIN — Medication 40 MILLIGRAM(S): at 13:17

## 2022-11-20 RX ADMIN — Medication 40 MILLIGRAM(S): at 06:06

## 2022-11-20 RX ADMIN — CLOPIDOGREL BISULFATE 75 MILLIGRAM(S): 75 TABLET, FILM COATED ORAL at 11:20

## 2022-11-20 RX ADMIN — Medication 40 MILLIGRAM(S): at 21:51

## 2022-11-20 RX ADMIN — BUDESONIDE AND FORMOTEROL FUMARATE DIHYDRATE 2 PUFF(S): 160; 4.5 AEROSOL RESPIRATORY (INHALATION) at 09:11

## 2022-11-20 RX ADMIN — RIVAROXABAN 15 MILLIGRAM(S): KIT at 17:23

## 2022-11-20 NOTE — PROGRESS NOTE ADULT - PROBLEM SELECTOR PLAN 6
: Avoid nephrotoxic drugs  monitor BMP  Renal eval. Avoid nephrotoxic drugs  monitor BMP  Renal eval.

## 2022-11-20 NOTE — PHYSICAL THERAPY INITIAL EVALUATION ADULT - GENERAL OBSERVATIONS, REHAB EVAL
Consult received,EMR, radiology and labs reviewed. Patient received supine in bed, NAD, [place] speaking Int ID [id number]. Patient agreed to EVALUATION from Physical Therapist.

## 2022-11-20 NOTE — PROGRESS NOTE ADULT - SUBJECTIVE AND OBJECTIVE BOX
CHIEF COMPLAINT:Patient is a 87y old  Male who presents with a chief complaint of COPD exacerbation.Pt appears comfortable.    	  REVIEW OF SYSTEMS:  CONSTITUTIONAL: No fever, weight loss, or fatigue  EYES: No eye pain, visual disturbances, or discharge  ENT:  No difficulty hearing, tinnitus, vertigo; No sinus or throat pain  NECK: No pain or stiffness  RESPIRATORY: No cough, wheezing, chills or hemoptysis; No Shortness of Breath  CARDIOVASCULAR: No chest pain, palpitations, passing out, dizziness, or leg swelling  GASTROINTESTINAL: No abdominal or epigastric pain. No nausea, vomiting, or hematemesis; No diarrhea or constipation. No melena or hematochezia.  GENITOURINARY: No dysuria, frequency, hematuria, or incontinence  NEUROLOGICAL: No headaches, memory loss, loss of strength, numbness, or tremors  SKIN: No itching, burning, rashes, or lesions   LYMPH Nodes: No enlarged glands  ENDOCRINE: No heat or cold intolerance; No hair loss  MUSCULOSKELETAL: No joint pain or swelling; No muscle, back, or extremity pain  PSYCHIATRIC: No depression, anxiety, mood swings, or difficulty sleeping  HEME/LYMPH: No easy bruising, or bleeding gums  ALLERGY AND IMMUNOLOGIC: No hives or eczema	      PHYSICAL EXAM:  T(C): 36.5 (11-20-22 @ 05:52), Max: 36.9 (11-19-22 @ 16:08)  HR: 92 (11-20-22 @ 11:18) (76 - 99)  BP: 141/71 (11-20-22 @ 11:18) (141/71 - 167/78)  RR: 18 (11-20-22 @ 05:52) (18 - 18)  SpO2: 96% (11-20-22 @ 09:45) (93% - 97%)  Wt(kg): --  I&O's Summary      Appearance: Normal	  HEENT:   Normal oral mucosa, PERRL, EOMI	  Lymphatic: No lymphadenopathy  Cardiovascular: Normal S1 S2, No JVD, No murmurs, No edema  Respiratory: B/L ronchi  Psychiatry: A & O x 3, Mood & affect appropriate  Gastrointestinal:  Soft, Non-tender, + BS	  Skin: No rashes, No ecchymoses, No cyanosis	  Neurologic: Non-focal  Extremities: Normal range of motion, No clubbing, cyanosis or edema  Vascular: Peripheral pulses palpable 2+ bilaterally    MEDICATIONS  (STANDING):  aMIOdarone    Tablet 200 milliGRAM(s) Oral daily  amLODIPine   Tablet 5 milliGRAM(s) Oral daily  atorvastatin 10 milliGRAM(s) Oral at bedtime  budesonide  80 MICROgram(s)/formoterol 4.5 MICROgram(s) Inhaler 2 Puff(s) Inhalation two times a day  clopidogrel Tablet 75 milliGRAM(s) Oral daily  hydrALAZINE 50 milliGRAM(s) Oral three times a day  insulin lispro (ADMELOG) corrective regimen sliding scale   SubCutaneous three times a day before meals  insulin lispro (ADMELOG) corrective regimen sliding scale   SubCutaneous at bedtime  isosorbide   mononitrate ER Tablet (IMDUR) 30 milliGRAM(s) Oral daily  methylPREDNISolone sodium succinate Injectable 40 milliGRAM(s) IV Push every 8 hours  metoprolol tartrate 12.5 milliGRAM(s) Oral two times a day  rivaroxaban 15 milliGRAM(s) Oral with dinner  tamsulosin 0.4 milliGRAM(s) Oral at bedtime        	  LABS:	 	      Troponin I, High Sensitivity Result: 56.2 ng/L (11-19 @ 05:00)  Troponin I, High Sensitivity Result: 24.2 ng/L (11-18 @ 13:45)                            17.3   8.50  )-----------( 214      ( 20 Nov 2022 06:30 )             51.9     11-20    138  |  105  |  26<H>  ----------------------------<  174<H>  4.3   |  25  |  1.82<H>    Ca    9.6      20 Nov 2022 06:30    TPro  7.2  /  Alb  3.4<L>  /  TBili  0.6  /  DBili  x   /  AST  18  /  ALT  24  /  AlkPhos  66  11-18    proBNP: Serum Pro-Brain Natriuretic Peptide: 1008 pg/mL (11-18 @ 13:45)    < from: CT Chest No Cont (11.20.22 @ 10:18) >  ACC: [id number] EXAM:  CT CHEST                          PROCEDURE DATE:  11/20/2022          INTERPRETATION:  CLINICAL INFORMATION: Shortness of breath    COMPARISON: None.    CONTRAST/COMPLICATIONS:  IV Contrast: NONE  Oral Contrast: NONE  Complications: None reported at time of study completion    PROCEDURE:  CT of the Chest was performed.  Sagittal and coronal reformats were performed.    FINDINGS:    LUNGS AND AIRWAYS: Patent central airways.  6 mm left lower lobe nodular   opacity with adjacent tree-in-bud and groundglass opacities. No pulmonary   consolidation.  PLEURA: No pleural effusion.  MEDIASTINUM AND CLAUDE: No lymphadenopathy.  VESSELS: Coronary artery and aortic calcifications..  HEART: Cardiomegaly. No pericardial effusion.  CHEST WALL AND LOWER NECK: Mild bilateral gynecomastia..  VISUALIZED UPPER ABDOMEN: Cholecystectomy.  BONES: Degenerative changes of the spine.    IMPRESSION:  A 6 mm left lower lobe nodular opacity with adjacent groundglass   opacities may be infectious or inflammatory, however, Fleischner society   recommendations for incidental pulmonary nodule follow-up:    >4-6mm:  Non-smoker: 12 month follow-up chest CT recommended and if unchanged, no   further follow-up necessary.  Smoker: Initial follow-up chest CT at 6-12 months then at 18-24 months if   no change.        --- End of Report ---            RIANA ARGUETA MD; Attending Radiologist  This document has been electronically signed. Nov 20 2022 10:25AM    < end of copied text >

## 2022-11-20 NOTE — PROGRESS NOTE ADULT - SUBJECTIVE AND OBJECTIVE BOX
Patient is a 87y old  Male who presents with a chief complaint of CHF exacerbation (20 Nov 2022 06:20)    Patient is awake, alert , laying comfortably in the bed in no acute distress at room air.    INTERVAL HPI/OVERNIGHT EVENTS:      VITAL SIGNS:  T(F): 97.7 (11-20-22 @ 05:52)  HR: 83 (11-20-22 @ 05:52)  BP: 162/72 (11-20-22 @ 05:52)  RR: 18 (11-20-22 @ 05:52)  SpO2: 93% (11-20-22 @ 05:52)  Wt(kg): --  I&O's Detail          REVIEW OF SYSTEMS:    CONSTITUTIONAL:  No fevers, chills, sweats    HEENT:  Eyes:  No diplopia or blurred vision. ENT:  No earache, sore throat or runny nose.    CARDIOVASCULAR:  No pressure, squeezing, tightness, or heaviness about the chest; no palpitations.    RESPIRATORY:  Per HPI    GASTROINTESTINAL:  No abdominal pain, nausea, vomiting or diarrhea.    GENITOURINARY:  No dysuria, frequency or urgency.    NEUROLOGIC:  No paresthesias, fasciculations, seizures or weakness.    PSYCHIATRIC:  No disorder of thought or mood.      PHYSICAL EXAM:    Constitutional: Well developed and nourished  Eyes:Perrla  ENMT: normal  Neck:supple  Respiratory: good air entry  Cardiovascular: S1 S2 regular  Gastrointestinal: Soft, Non tender  Extremities: No edema  Vascular:normal  Neurological:Awake, alert,Ox3  Musculoskeletal:Normal      MEDICATIONS  (STANDING):  aMIOdarone    Tablet 200 milliGRAM(s) Oral daily  amLODIPine   Tablet 5 milliGRAM(s) Oral daily  atorvastatin 10 milliGRAM(s) Oral at bedtime  budesonide  80 MICROgram(s)/formoterol 4.5 MICROgram(s) Inhaler 2 Puff(s) Inhalation two times a day  clopidogrel Tablet 75 milliGRAM(s) Oral daily  hydrALAZINE 50 milliGRAM(s) Oral three times a day  insulin lispro (ADMELOG) corrective regimen sliding scale   SubCutaneous three times a day before meals  insulin lispro (ADMELOG) corrective regimen sliding scale   SubCutaneous at bedtime  isosorbide   mononitrate ER Tablet (IMDUR) 30 milliGRAM(s) Oral daily  methylPREDNISolone sodium succinate Injectable 40 milliGRAM(s) IV Push every 8 hours  metoprolol tartrate 12.5 milliGRAM(s) Oral two times a day  rivaroxaban 15 milliGRAM(s) Oral with dinner  tamsulosin 0.4 milliGRAM(s) Oral at bedtime    MEDICATIONS  (PRN):  acetaminophen     Tablet .. 650 milliGRAM(s) Oral every 6 hours PRN Temp greater or equal to 38C (100.4F), Mild Pain (1 - 3)  albuterol    90 MICROgram(s) HFA Inhaler 2 Puff(s) Inhalation every 6 hours PRN Shortness of Breath and/or Wheezing  melatonin 3 milliGRAM(s) Oral at bedtime PRN Insomnia  ondansetron Injectable 4 milliGRAM(s) IV Push every 8 hours PRN Nausea and/or Vomiting      Allergies    No Known Allergies    Intolerances        LABS:                        17.3   8.50  )-----------( 214      ( 20 Nov 2022 06:30 )             51.9     11-20    138  |  105  |  26<H>  ----------------------------<  174<H>  4.3   |  25  |  1.82<H>    Ca    9.6      20 Nov 2022 06:30    TPro  7.2  /  Alb  3.4<L>  /  TBili  0.6  /  DBili  x   /  AST  18  /  ALT  24  /  AlkPhos  66  11-18    PT/INR - ( 18 Nov 2022 21:07 )   PT: 15.1 sec;   INR: 1.27 ratio         PTT - ( 18 Nov 2022 21:07 )  PTT:31.4 sec          CAPILLARY BLOOD GLUCOSE      POCT Blood Glucose.: 173 mg/dL (20 Nov 2022 07:51)  POCT Blood Glucose.: 200 mg/dL (19 Nov 2022 21:34)  POCT Blood Glucose.: 182 mg/dL (19 Nov 2022 16:36)  POCT Blood Glucose.: 175 mg/dL (19 Nov 2022 11:47)    pro-bnp -- 11-18 @ 21:07     d-dimer 181  11-18 @ 21:07  pro-bnp 1008 11-18 @ 13:45     d-dimer --  11-18 @ 13:45      RADIOLOGY & ADDITIONAL TESTS:    CXR:  < from: US Duplex Venous Lower Ext Complete, Bilateral (11.18.22 @ 16:28) >  IMPRESSION:  No evidence of deep venous thrombosis in either lower extremity.          --- End of Report ---      < end of copied text >  < from: Xray Chest 1 View- PORTABLE-Urgent (11.18.22 @ 15:50) >  Impression:    No acute pulmonary disease.    --- End of Report ---      < end of copied text >  < from: Transthoracic Echocardiogram (07.19.22 @ 07:16) >  CONCLUSIONS:  1. Normal mitral valve. Mild mitral regurgitation.  2. Normal trileaflet aortic valve.  3. Aortic Root: 4.4cm.    4. Normal left atrium.  LA volume index = 25 cc/m2.  5. Moderate concentric left ventricular hypertrophy.  6. Normal left ventricular systolic function.  7. Unable to adequately assess diastolic function due to  technical aspects of this study.  8. Normal right atrium.  9. Normal right ventricular size and systolic function  (TAPSE 2.3 cm).  10. RV systolic pressure is moderately increased at  48 mm  Hg.  11. There is mild tricuspid regurgitation.  12. There is mild pulmonic regurgitation.  13. Normal pericardium with no pericardial effusion.      < end of copied text >    Ct scan chest:    ekg;    echo: Patient is a 87y old  Male who presents with a chief complaint of CHF exacerbation (20 Nov 2022 06:20)    Patient is awake, alert , laying comfortably in the bed in no acute distress at room air. Patient complaint of [place] and is going for Chest CT to rule out Pulmonary embolism.    INTERVAL HPI/OVERNIGHT EVENTS:      VITAL SIGNS:  T(F): 97.7 (11-20-22 @ 05:52)  HR: 83 (11-20-22 @ 05:52)  BP: 162/72 (11-20-22 @ 05:52)  RR: 18 (11-20-22 @ 05:52)  SpO2: 93% (11-20-22 @ 05:52)  Wt(kg): --  I&O's Detail          REVIEW OF SYSTEMS:    CONSTITUTIONAL:  No fevers, chills, sweats    HEENT:  Eyes:  No diplopia or blurred vision. ENT:  No earache, sore throat or runny nose.    CARDIOVASCULAR:  No pressure, squeezing, tightness, or heaviness about the chest; no palpitations.    RESPIRATORY:  Per HPI    GASTROINTESTINAL:  No abdominal pain, nausea, vomiting or diarrhea.    GENITOURINARY:  No dysuria, frequency or urgency.    NEUROLOGIC:  No paresthesias, fasciculations, seizures or weakness.    PSYCHIATRIC:  No disorder of thought or mood.      PHYSICAL EXAM:    Constitutional: Well developed and nourished  Eyes:Perrla  ENMT: normal  Neck:supple  Respiratory: good air entry  Cardiovascular: S1 S2 regular  Gastrointestinal: Soft, Non tender  Extremities: No edema  Vascular:normal  Neurological:Awake, alert,Ox3  Musculoskeletal:Normal      MEDICATIONS  (STANDING):  aMIOdarone    Tablet 200 milliGRAM(s) Oral daily  amLODIPine   Tablet 5 milliGRAM(s) Oral daily  atorvastatin 10 milliGRAM(s) Oral at bedtime  budesonide  80 MICROgram(s)/formoterol 4.5 MICROgram(s) Inhaler 2 Puff(s) Inhalation two times a day  clopidogrel Tablet 75 milliGRAM(s) Oral daily  hydrALAZINE 50 milliGRAM(s) Oral three times a day  insulin lispro (ADMELOG) corrective regimen sliding scale   SubCutaneous three times a day before meals  insulin lispro (ADMELOG) corrective regimen sliding scale   SubCutaneous at bedtime  isosorbide   mononitrate ER Tablet (IMDUR) 30 milliGRAM(s) Oral daily  methylPREDNISolone sodium succinate Injectable 40 milliGRAM(s) IV Push every 8 hours  metoprolol tartrate 12.5 milliGRAM(s) Oral two times a day  rivaroxaban 15 milliGRAM(s) Oral with dinner  tamsulosin 0.4 milliGRAM(s) Oral at bedtime    MEDICATIONS  (PRN):  acetaminophen     Tablet .. 650 milliGRAM(s) Oral every 6 hours PRN Temp greater or equal to 38C (100.4F), Mild Pain (1 - 3)  albuterol    90 MICROgram(s) HFA Inhaler 2 Puff(s) Inhalation every 6 hours PRN Shortness of Breath and/or Wheezing  melatonin 3 milliGRAM(s) Oral at bedtime PRN Insomnia  ondansetron Injectable 4 milliGRAM(s) IV Push every 8 hours PRN Nausea and/or Vomiting      Allergies    No Known Allergies    Intolerances        LABS:                        17.3   8.50  )-----------( 214      ( 20 Nov 2022 06:30 )             51.9     11-20    138  |  105  |  26<H>  ----------------------------<  174<H>  4.3   |  25  |  1.82<H>    Ca    9.6      20 Nov 2022 06:30    TPro  7.2  /  Alb  3.4<L>  /  TBili  0.6  /  DBili  x   /  AST  18  /  ALT  24  /  AlkPhos  66  11-18    PT/INR - ( 18 Nov 2022 21:07 )   PT: 15.1 sec;   INR: 1.27 ratio         PTT - ( 18 Nov 2022 21:07 )  PTT:31.4 sec          CAPILLARY BLOOD GLUCOSE      POCT Blood Glucose.: 173 mg/dL (20 Nov 2022 07:51)  POCT Blood Glucose.: 200 mg/dL (19 Nov 2022 21:34)  POCT Blood Glucose.: 182 mg/dL (19 Nov 2022 16:36)  POCT Blood Glucose.: 175 mg/dL (19 Nov 2022 11:47)    pro-bnp -- 11-18 @ 21:07     d-dimer 181  11-18 @ 21:07  pro-bnp 1008 11-18 @ 13:45     d-dimer --  11-18 @ 13:45      RADIOLOGY & ADDITIONAL TESTS:    CXR:  < from: US Duplex Venous Lower Ext Complete, Bilateral (11.18.22 @ 16:28) >  IMPRESSION:  No evidence of deep venous thrombosis in either lower extremity.          --- End of Report ---      < end of copied text >  < from: Xray Chest 1 View- PORTABLE-Urgent (11.18.22 @ 15:50) >  Impression:    No acute pulmonary disease.    --- End of Report ---      < end of copied text >  < from: Transthoracic Echocardiogram (07.19.22 @ 07:16) >  CONCLUSIONS:  1. Normal mitral valve. Mild mitral regurgitation.  2. Normal trileaflet aortic valve.  3. Aortic Root: 4.4cm.    4. Normal left atrium.  LA volume index = 25 cc/m2.  5. Moderate concentric left ventricular hypertrophy.  6. Normal left ventricular systolic function.  7. Unable to adequately assess diastolic function due to  technical aspects of this study.  8. Normal right atrium.  9. Normal right ventricular size and systolic function  (TAPSE 2.3 cm).  10. RV systolic pressure is moderately increased at  48 mm  Hg.  11. There is mild tricuspid regurgitation.  12. There is mild pulmonic regurgitation.  13. Normal pericardium with no pericardial effusion.      < end of copied text >    Ct scan chest:    ekg;    echo: Patient is a 87y old  Male who presents with a chief complaint of CHF exacerbation (20 Nov 2022 06:20)    Patient is awake, alert , laying comfortably in the bed in no acute distress at room air. Patient complaint of [place] and is going for Chest CT to rule out Pulmonary embolism.    INTERVAL HPI/OVERNIGHT EVENTS:      VITAL SIGNS:  T(F): 97.7 (11-20-22 @ 05:52)  HR: 83 (11-20-22 @ 05:52)  BP: 162/72 (11-20-22 @ 05:52)  RR: 18 (11-20-22 @ 05:52)  SpO2: 93% (11-20-22 @ 05:52)  Wt(kg): --  I&O's Detail          REVIEW OF SYSTEMS:    CONSTITUTIONAL:  No fevers, chills, sweats    HEENT:  Eyes:  No diplopia or blurred vision. ENT:  No earache, sore throat or runny nose.    CARDIOVASCULAR:  No pressure, squeezing, tightness, or heaviness about the chest; no palpitations.    RESPIRATORY:  Per HPI    GASTROINTESTINAL:  No abdominal pain, nausea, vomiting or diarrhea.    GENITOURINARY:  No dysuria, frequency or urgency.    NEUROLOGIC:  No paresthesias, fasciculations, seizures or weakness.    PSYCHIATRIC:  No disorder of thought or mood.      PHYSICAL EXAM:    Constitutional: Well developed and nourished  Eyes:Perrla  ENMT: normal  Neck:supple  Respiratory: good air entry  Cardiovascular: S1 S2 regular  Gastrointestinal: Soft, Non tender  Extremities: + edema  Vascular:normal  Neurological:Awake, alert,Ox3  Musculoskeletal:Normal      MEDICATIONS  (STANDING):  aMIOdarone    Tablet 200 milliGRAM(s) Oral daily  amLODIPine   Tablet 5 milliGRAM(s) Oral daily  atorvastatin 10 milliGRAM(s) Oral at bedtime  budesonide  80 MICROgram(s)/formoterol 4.5 MICROgram(s) Inhaler 2 Puff(s) Inhalation two times a day  clopidogrel Tablet 75 milliGRAM(s) Oral daily  hydrALAZINE 50 milliGRAM(s) Oral three times a day  insulin lispro (ADMELOG) corrective regimen sliding scale   SubCutaneous three times a day before meals  insulin lispro (ADMELOG) corrective regimen sliding scale   SubCutaneous at bedtime  isosorbide   mononitrate ER Tablet (IMDUR) 30 milliGRAM(s) Oral daily  methylPREDNISolone sodium succinate Injectable 40 milliGRAM(s) IV Push every 8 hours  metoprolol tartrate 12.5 milliGRAM(s) Oral two times a day  rivaroxaban 15 milliGRAM(s) Oral with dinner  tamsulosin 0.4 milliGRAM(s) Oral at bedtime    MEDICATIONS  (PRN):  acetaminophen     Tablet .. 650 milliGRAM(s) Oral every 6 hours PRN Temp greater or equal to 38C (100.4F), Mild Pain (1 - 3)  albuterol    90 MICROgram(s) HFA Inhaler 2 Puff(s) Inhalation every 6 hours PRN Shortness of Breath and/or Wheezing  melatonin 3 milliGRAM(s) Oral at bedtime PRN Insomnia  ondansetron Injectable 4 milliGRAM(s) IV Push every 8 hours PRN Nausea and/or Vomiting      Allergies    No Known Allergies    Intolerances        LABS:                        17.3   8.50  )-----------( 214      ( 20 Nov 2022 06:30 )             51.9     11-20    138  |  105  |  26<H>  ----------------------------<  174<H>  4.3   |  25  |  1.82<H>    Ca    9.6      20 Nov 2022 06:30    TPro  7.2  /  Alb  3.4<L>  /  TBili  0.6  /  DBili  x   /  AST  18  /  ALT  24  /  AlkPhos  66  11-18    PT/INR - ( 18 Nov 2022 21:07 )   PT: 15.1 sec;   INR: 1.27 ratio         PTT - ( 18 Nov 2022 21:07 )  PTT:31.4 sec          CAPILLARY BLOOD GLUCOSE      POCT Blood Glucose.: 173 mg/dL (20 Nov 2022 07:51)  POCT Blood Glucose.: 200 mg/dL (19 Nov 2022 21:34)  POCT Blood Glucose.: 182 mg/dL (19 Nov 2022 16:36)  POCT Blood Glucose.: 175 mg/dL (19 Nov 2022 11:47)    pro-bnp -- 11-18 @ 21:07     d-dimer 181  11-18 @ 21:07  pro-bnp 1008 11-18 @ 13:45     d-dimer --  11-18 @ 13:45      RADIOLOGY & ADDITIONAL TESTS:    CXR:  < from: US Duplex Venous Lower Ext Complete, Bilateral (11.18.22 @ 16:28) >  IMPRESSION:  No evidence of deep venous thrombosis in either lower extremity.          --- End of Report ---      < end of copied text >  < from: Xray Chest 1 View- PORTABLE-Urgent (11.18.22 @ 15:50) >  Impression:    No acute pulmonary disease.    --- End of Report ---      < end of copied text >  < from: Transthoracic Echocardiogram (07.19.22 @ 07:16) >  CONCLUSIONS:  1. Normal mitral valve. Mild mitral regurgitation.  2. Normal trileaflet aortic valve.  3. Aortic Root: 4.4cm.    4. Normal left atrium.  LA volume index = 25 cc/m2.  5. Moderate concentric left ventricular hypertrophy.  6. Normal left ventricular systolic function.  7. Unable to adequately assess diastolic function due to  technical aspects of this study.  8. Normal right atrium.  9. Normal right ventricular size and systolic function  (TAPSE 2.3 cm).  10. RV systolic pressure is moderately increased at  48 mm  Hg.  11. There is mild tricuspid regurgitation.  12. There is mild pulmonic regurgitation.  13. Normal pericardium with no pericardial effusion.      < end of copied text >    Ct scan chest:    ekg;    echo:

## 2022-11-20 NOTE — PROGRESS NOTE ADULT - SUBJECTIVE AND OBJECTIVE BOX
Patient is a 87y old  Male who presents with a chief complaint of CHF exacerbation (19 Nov 2022 12:59)    pt seen in ed tele [x  ], reg med floor [   ], bed [ x ], chair at bedside [   ], a+o x3 [x  ], lethargic [  ],    nad [ x ]      Allergies    No Known Allergies        Vitals    T(F): 97.7 (11-20-22 @ 05:52), Max: 98.6 (11-19-22 @ 11:43)  HR: 83 (11-20-22 @ 05:52) (74 - 90)  BP: 162/72 (11-20-22 @ 05:52) (142/69 - 167/78)  RR: 18 (11-20-22 @ 05:52) (18 - 18)  SpO2: 93% (11-20-22 @ 05:52) (93% - 97%)  Wt(kg): --  CAPILLARY BLOOD GLUCOSE      POCT Blood Glucose.: 200 mg/dL (19 Nov 2022 21:34)      Labs                          15.4   5.46  )-----------( 175      ( 19 Nov 2022 05:00 )             45.4       11-19    140  |  105  |  19<H>  ----------------------------<  107<H>  3.7   |  27  |  1.66<H>    Ca    8.9      19 Nov 2022 05:00    TPro  7.2  /  Alb  3.4<L>  /  TBili  0.6  /  DBili  x   /  AST  18  /  ALT  24  /  AlkPhos  66  11-18          Troponin I, High Sensitivity Result: 56.2 ng/L (11-19-22 @ 05:00)  Troponin I, High Sensitivity Result: 24.2 ng/L (11-18-22 @ 13:45)        Radiology Results      Meds    MEDICATIONS  (STANDING):  aMIOdarone    Tablet 200 milliGRAM(s) Oral daily  amLODIPine   Tablet 5 milliGRAM(s) Oral daily  atorvastatin 10 milliGRAM(s) Oral at bedtime  budesonide  80 MICROgram(s)/formoterol 4.5 MICROgram(s) Inhaler 2 Puff(s) Inhalation two times a day  clopidogrel Tablet 75 milliGRAM(s) Oral daily  hydrALAZINE 50 milliGRAM(s) Oral three times a day  insulin lispro (ADMELOG) corrective regimen sliding scale   SubCutaneous three times a day before meals  insulin lispro (ADMELOG) corrective regimen sliding scale   SubCutaneous at bedtime  isosorbide   mononitrate ER Tablet (IMDUR) 30 milliGRAM(s) Oral daily  methylPREDNISolone sodium succinate Injectable 40 milliGRAM(s) IV Push every 8 hours  metoprolol tartrate 12.5 milliGRAM(s) Oral two times a day  rivaroxaban 15 milliGRAM(s) Oral with dinner  tamsulosin 0.4 milliGRAM(s) Oral at bedtime      MEDICATIONS  (PRN):  acetaminophen     Tablet .. 650 milliGRAM(s) Oral every 6 hours PRN Temp greater or equal to 38C (100.4F), Mild Pain (1 - 3)  albuterol    90 MICROgram(s) HFA Inhaler 2 Puff(s) Inhalation every 6 hours PRN Shortness of Breath and/or Wheezing  melatonin 3 milliGRAM(s) Oral at bedtime PRN Insomnia  ondansetron Injectable 4 milliGRAM(s) IV Push every 8 hours PRN Nausea and/or Vomiting      Physical Exam    Neuro :  no focal deficits  Respiratory: posterior wheeze B/L  CV: RRR, S1S2, no murmurs,   Abdominal: Soft, NT, ND +BS,  Extremities: No edema, + peripheral pulses      ASSESSMENT    harris,   uncontrolled htn,  r/o acs,   r/o chf,   pe r/o,   poss copd exacerb,   h/o HTN,   CAD (s/p stent 7/22)   KENRICK and COPD overlap syndrome  DM2 (diabetes mellitus, type 2)  HLD (hyperlipidemia)  CKD (chronic kidney disease)  Chronic atrial fibrillation  BPH without urinary obstruction  Renal cell cancer s/p right nephrectomy  S/P cholecystectomy        PLAN    cont tele,   acs protocol,   aspirin, statin,   trop x2 neg noted above  cardio cons  lopressor,   f/u echo   cont solumedrol,   albuterol q6 prn,   pulm cons   f/u ct chest   supplemental O2 prn,   d dimer neg noted above   lispro ss,   hgba1c 6.3 noted above  cont current meds            Patient is a 87y old  Male who presents with a chief complaint of CHF exacerbation (19 Nov 2022 12:59)    pt seen in ed tele [x  ], reg med floor [   ], bed [ x ], chair at bedside [   ], a+o x3 [x  ], lethargic [  ],    nad [ x ]      Allergies    No Known Allergies        Vitals    T(F): 97.7 (11-20-22 @ 05:52), Max: 98.6 (11-19-22 @ 11:43)  HR: 83 (11-20-22 @ 05:52) (74 - 90)  BP: 162/72 (11-20-22 @ 05:52) (142/69 - 167/78)  RR: 18 (11-20-22 @ 05:52) (18 - 18)  SpO2: 93% (11-20-22 @ 05:52) (93% - 97%)  Wt(kg): --  CAPILLARY BLOOD GLUCOSE      POCT Blood Glucose.: 200 mg/dL (19 Nov 2022 21:34)      Labs                          15.4   5.46  )-----------( 175      ( 19 Nov 2022 05:00 )             45.4       11-19    140  |  105  |  19<H>  ----------------------------<  107<H>  3.7   |  27  |  1.66<H>    Ca    8.9      19 Nov 2022 05:00    TPro  7.2  /  Alb  3.4<L>  /  TBili  0.6  /  DBili  x   /  AST  18  /  ALT  24  /  AlkPhos  66  11-18          Troponin I, High Sensitivity Result: 56.2 ng/L (11-19-22 @ 05:00)  Troponin I, High Sensitivity Result: 24.2 ng/L (11-18-22 @ 13:45)        Radiology Results      Meds    MEDICATIONS  (STANDING):  aMIOdarone    Tablet 200 milliGRAM(s) Oral daily  amLODIPine   Tablet 5 milliGRAM(s) Oral daily  atorvastatin 10 milliGRAM(s) Oral at bedtime  budesonide  80 MICROgram(s)/formoterol 4.5 MICROgram(s) Inhaler 2 Puff(s) Inhalation two times a day  clopidogrel Tablet 75 milliGRAM(s) Oral daily  hydrALAZINE 50 milliGRAM(s) Oral three times a day  insulin lispro (ADMELOG) corrective regimen sliding scale   SubCutaneous three times a day before meals  insulin lispro (ADMELOG) corrective regimen sliding scale   SubCutaneous at bedtime  isosorbide   mononitrate ER Tablet (IMDUR) 30 milliGRAM(s) Oral daily  methylPREDNISolone sodium succinate Injectable 40 milliGRAM(s) IV Push every 8 hours  metoprolol tartrate 12.5 milliGRAM(s) Oral two times a day  rivaroxaban 15 milliGRAM(s) Oral with dinner  tamsulosin 0.4 milliGRAM(s) Oral at bedtime      MEDICATIONS  (PRN):  acetaminophen     Tablet .. 650 milliGRAM(s) Oral every 6 hours PRN Temp greater or equal to 38C (100.4F), Mild Pain (1 - 3)  albuterol    90 MICROgram(s) HFA Inhaler 2 Puff(s) Inhalation every 6 hours PRN Shortness of Breath and/or Wheezing  melatonin 3 milliGRAM(s) Oral at bedtime PRN Insomnia  ondansetron Injectable 4 milliGRAM(s) IV Push every 8 hours PRN Nausea and/or Vomiting      Physical Exam    Neuro :  no focal deficits  Respiratory: posterior wheeze B/L  CV: RRR, S1S2, no murmurs,   Abdominal: Soft, NT, ND +BS,  Extremities: No edema, + peripheral pulses      ASSESSMENT    harris,   uncontrolled htn,  r/o acs,   r/o chf,   pe r/o,   poss copd exacerb,   h/o HTN,   CAD (s/p stent 7/22)   KENRICK and COPD overlap syndrome  DM2 (diabetes mellitus, type 2)  HLD (hyperlipidemia)  CKD (chronic kidney disease)  Chronic atrial fibrillation  BPH without urinary obstruction  Renal cell cancer s/p right nephrectomy  S/P cholecystectomy        PLAN    cont tele,   acs protocol,   plavix, statin,   trop x2 neg noted above  cardio f/u   lopressor,   added imdur 30mg qd,   cont hydralazine 50mg q8  f/u echo   cont solumedrol,   albuterol q6 prn,   pulm f/u   f/u ct chest   supplemental O2 prn,   C-pap machine at night  Weight reduction.  d dimer neg noted    lispro ss,   hgba1c 6.3 noted    phys tx eval   cont current meds

## 2022-11-21 DIAGNOSIS — G47.33 OBSTRUCTIVE SLEEP APNEA (ADULT) (PEDIATRIC): ICD-10-CM

## 2022-11-21 DIAGNOSIS — Z02.9 ENCOUNTER FOR ADMINISTRATIVE EXAMINATIONS, UNSPECIFIED: ICD-10-CM

## 2022-11-21 DIAGNOSIS — I27.20 PULMONARY HYPERTENSION, UNSPECIFIED: ICD-10-CM

## 2022-11-21 LAB
ANION GAP SERPL CALC-SCNC: 8 MMOL/L — SIGNIFICANT CHANGE UP (ref 5–17)
APPEARANCE UR: CLEAR — SIGNIFICANT CHANGE UP
BACTERIA # UR AUTO: ABNORMAL /HPF
BILIRUB UR-MCNC: NEGATIVE — SIGNIFICANT CHANGE UP
BUN SERPL-MCNC: 38 MG/DL — HIGH (ref 7–18)
CALCIUM SERPL-MCNC: 9.1 MG/DL — SIGNIFICANT CHANGE UP (ref 8.4–10.5)
CHLORIDE SERPL-SCNC: 107 MMOL/L — SIGNIFICANT CHANGE UP (ref 96–108)
CO2 SERPL-SCNC: 25 MMOL/L — SIGNIFICANT CHANGE UP (ref 22–31)
COLOR SPEC: YELLOW — SIGNIFICANT CHANGE UP
CREAT ?TM UR-MCNC: 92 MG/DL — SIGNIFICANT CHANGE UP
CREAT SERPL-MCNC: 2.14 MG/DL — HIGH (ref 0.5–1.3)
DIFF PNL FLD: NEGATIVE — SIGNIFICANT CHANGE UP
EGFR: 30 ML/MIN/1.73M2 — LOW
EPI CELLS # UR: SIGNIFICANT CHANGE UP /HPF
GLUCOSE BLDC GLUCOMTR-MCNC: 187 MG/DL — HIGH (ref 70–99)
GLUCOSE BLDC GLUCOMTR-MCNC: 190 MG/DL — HIGH (ref 70–99)
GLUCOSE BLDC GLUCOMTR-MCNC: 209 MG/DL — HIGH (ref 70–99)
GLUCOSE BLDC GLUCOMTR-MCNC: 248 MG/DL — HIGH (ref 70–99)
GLUCOSE SERPL-MCNC: 250 MG/DL — HIGH (ref 70–99)
GLUCOSE UR QL: 1000 MG/DL
GRAN CASTS # UR COMP ASSIST: ABNORMAL /LPF
HCT VFR BLD CALC: 46.4 % — SIGNIFICANT CHANGE UP (ref 39–50)
HGB BLD-MCNC: 15.3 G/DL — SIGNIFICANT CHANGE UP (ref 13–17)
HYALINE CASTS # UR AUTO: ABNORMAL /LPF
KETONES UR-MCNC: NEGATIVE — SIGNIFICANT CHANGE UP
LEUKOCYTE ESTERASE UR-ACNC: ABNORMAL
MCHC RBC-ENTMCNC: 32.1 PG — SIGNIFICANT CHANGE UP (ref 27–34)
MCHC RBC-ENTMCNC: 33 GM/DL — SIGNIFICANT CHANGE UP (ref 32–36)
MCV RBC AUTO: 97.5 FL — SIGNIFICANT CHANGE UP (ref 80–100)
NITRITE UR-MCNC: NEGATIVE — SIGNIFICANT CHANGE UP
NRBC # BLD: 0 /100 WBCS — SIGNIFICANT CHANGE UP (ref 0–0)
OSMOLALITY UR: 748 MOS/KG — SIGNIFICANT CHANGE UP (ref 50–1200)
PH UR: 5 — SIGNIFICANT CHANGE UP (ref 5–8)
PLATELET # BLD AUTO: 235 K/UL — SIGNIFICANT CHANGE UP (ref 150–400)
POTASSIUM SERPL-MCNC: 4.3 MMOL/L — SIGNIFICANT CHANGE UP (ref 3.5–5.3)
POTASSIUM SERPL-SCNC: 4.3 MMOL/L — SIGNIFICANT CHANGE UP (ref 3.5–5.3)
POTASSIUM UR-SCNC: 46 MMOL/L — SIGNIFICANT CHANGE UP
PROT ?TM UR-MCNC: 39 MG/DL — HIGH (ref 0–12)
PROT ?TM UR-MCNC: 59 MG/DL — HIGH (ref 0–12)
PROT UR-MCNC: 30 MG/DL
RBC # BLD: 4.76 M/UL — SIGNIFICANT CHANGE UP (ref 4.2–5.8)
RBC # FLD: 13.1 % — SIGNIFICANT CHANGE UP (ref 10.3–14.5)
RBC CASTS # UR COMP ASSIST: SIGNIFICANT CHANGE UP /HPF (ref 0–2)
SODIUM SERPL-SCNC: 140 MMOL/L — SIGNIFICANT CHANGE UP (ref 135–145)
SODIUM UR-SCNC: 15 MMOL/L — SIGNIFICANT CHANGE UP
SODIUM UR-SCNC: 17 MMOL/L — SIGNIFICANT CHANGE UP
SP GR SPEC: 1.02 — SIGNIFICANT CHANGE UP (ref 1.01–1.02)
UROBILINOGEN FLD QL: NEGATIVE — SIGNIFICANT CHANGE UP
WBC # BLD: 17.94 K/UL — HIGH (ref 3.8–10.5)
WBC # FLD AUTO: 17.94 K/UL — HIGH (ref 3.8–10.5)
WBC UR QL: ABNORMAL /HPF (ref 0–5)

## 2022-11-21 RX ORDER — HALOPERIDOL DECANOATE 100 MG/ML
0.5 INJECTION INTRAMUSCULAR ONCE
Refills: 0 | Status: DISCONTINUED | OUTPATIENT
Start: 2022-11-21 | End: 2022-11-21

## 2022-11-21 RX ADMIN — Medication 40 MILLIGRAM(S): at 05:37

## 2022-11-21 RX ADMIN — Medication 50 MILLIGRAM(S): at 22:15

## 2022-11-21 RX ADMIN — Medication 12.5 MILLIGRAM(S): at 17:41

## 2022-11-21 RX ADMIN — Medication 50 MILLIGRAM(S): at 05:36

## 2022-11-21 RX ADMIN — Medication 2: at 11:54

## 2022-11-21 RX ADMIN — Medication 1200 MILLIGRAM(S): at 18:13

## 2022-11-21 RX ADMIN — Medication 1: at 17:40

## 2022-11-21 RX ADMIN — BUDESONIDE AND FORMOTEROL FUMARATE DIHYDRATE 2 PUFF(S): 160; 4.5 AEROSOL RESPIRATORY (INHALATION) at 09:36

## 2022-11-21 RX ADMIN — RIVAROXABAN 15 MILLIGRAM(S): KIT at 17:41

## 2022-11-21 RX ADMIN — AMIODARONE HYDROCHLORIDE 200 MILLIGRAM(S): 400 TABLET ORAL at 05:36

## 2022-11-21 RX ADMIN — Medication 12.5 MILLIGRAM(S): at 05:37

## 2022-11-21 RX ADMIN — BUDESONIDE AND FORMOTEROL FUMARATE DIHYDRATE 2 PUFF(S): 160; 4.5 AEROSOL RESPIRATORY (INHALATION) at 22:17

## 2022-11-21 RX ADMIN — Medication 40 MILLIGRAM(S): at 22:19

## 2022-11-21 RX ADMIN — AMLODIPINE BESYLATE 5 MILLIGRAM(S): 2.5 TABLET ORAL at 05:36

## 2022-11-21 RX ADMIN — ATORVASTATIN CALCIUM 10 MILLIGRAM(S): 80 TABLET, FILM COATED ORAL at 22:15

## 2022-11-21 RX ADMIN — ISOSORBIDE MONONITRATE 30 MILLIGRAM(S): 60 TABLET, EXTENDED RELEASE ORAL at 11:52

## 2022-11-21 RX ADMIN — Medication 50 MILLIGRAM(S): at 14:38

## 2022-11-21 RX ADMIN — Medication 1: at 08:44

## 2022-11-21 RX ADMIN — Medication 40 MILLIGRAM(S): at 14:39

## 2022-11-21 RX ADMIN — TAMSULOSIN HYDROCHLORIDE 0.4 MILLIGRAM(S): 0.4 CAPSULE ORAL at 22:15

## 2022-11-21 RX ADMIN — CLOPIDOGREL BISULFATE 75 MILLIGRAM(S): 75 TABLET, FILM COATED ORAL at 11:51

## 2022-11-21 NOTE — PROGRESS NOTE ADULT - SUBJECTIVE AND OBJECTIVE BOX
NP Note discussed with  Primary Attending    INTERVAL HPI/OVERNIGHT EVENTS: no new complaints    MEDICATIONS  (STANDING):  aMIOdarone    Tablet 200 milliGRAM(s) Oral daily  amLODIPine   Tablet 5 milliGRAM(s) Oral daily  atorvastatin 10 milliGRAM(s) Oral at bedtime  budesonide  80 MICROgram(s)/formoterol 4.5 MICROgram(s) Inhaler 2 Puff(s) Inhalation two times a day  clopidogrel Tablet 75 milliGRAM(s) Oral daily  hydrALAZINE 50 milliGRAM(s) Oral three times a day  insulin lispro (ADMELOG) corrective regimen sliding scale   SubCutaneous three times a day before meals  insulin lispro (ADMELOG) corrective regimen sliding scale   SubCutaneous at bedtime  isosorbide   mononitrate ER Tablet (IMDUR) 30 milliGRAM(s) Oral daily  methylPREDNISolone sodium succinate Injectable 40 milliGRAM(s) IV Push every 8 hours  metoprolol tartrate 12.5 milliGRAM(s) Oral two times a day  rivaroxaban 15 milliGRAM(s) Oral with dinner  tamsulosin 0.4 milliGRAM(s) Oral at bedtime    MEDICATIONS  (PRN):  acetaminophen     Tablet .. 650 milliGRAM(s) Oral every 6 hours PRN Temp greater or equal to 38C (100.4F), Mild Pain (1 - 3)  albuterol    90 MICROgram(s) HFA Inhaler 2 Puff(s) Inhalation every 6 hours PRN Shortness of Breath and/or Wheezing  melatonin 3 milliGRAM(s) Oral at bedtime PRN Insomnia  ondansetron Injectable 4 milliGRAM(s) IV Push every 8 hours PRN Nausea and/or Vomiting      __________________________________________________  REVIEW OF SYSTEMS:    CONSTITUTIONAL: No fever,   EYES: no acute visual disturbances  NECK: No pain or stiffness  RESPIRATORY: No cough; No shortness of breath  CARDIOVASCULAR: No chest pain, no palpitations  GASTROINTESTINAL: No pain. No nausea or vomiting; No diarrhea   NEUROLOGICAL: No headache or numbness, no tremors  MUSCULOSKELETAL: No joint pain, no muscle pain  GENITOURINARY: no dysuria, no frequency, no hesitancy  PSYCHIATRY: no depression , no anxiety  ALL OTHER  ROS negative        Vital Signs Last 24 Hrs  T(C): 36.9 (21 Nov 2022 05:08), Max: 36.9 (21 Nov 2022 05:08)  T(F): 98.4 (21 Nov 2022 05:08), Max: 98.4 (21 Nov 2022 05:08)  HR: 83 (21 Nov 2022 05:08) (80 - 98)  BP: 133/67 (21 Nov 2022 05:08) (108/69 - 150/66)  BP(mean): --  RR: 18 (20 Nov 2022 20:08) (17 - 18)  SpO2: 98% (21 Nov 2022 08:50) (92% - 99%)    Parameters below as of 21 Nov 2022 05:08  Patient On (Oxygen Delivery Method): BiPAP/CPAP        ________________________________________________  PHYSICAL EXAM:  GENERAL: NAD, obese  HEENT: Normocephalic;  conjunctivae and sclerae clear; moist mucous membranes;   NECK : supple  CHEST/LUNG: right side wheezing, left clear. eupneic on room air. `  HEART: S1 S2  regular; no murmurs, gallops or rubs  ABDOMEN: Soft, Nontender, Nondistended; Bowel sounds present  EXTREMITIES: no cyanosis; trace lower leg edema; no calf tenderness  SKIN: warm and dry; no rash  NERVOUS SYSTEM:  Awake and alert; Oriented  to place, person and time ; no new deficits    _________________________________________________  LABS:                        15.3   17.94 )-----------( 235      ( 21 Nov 2022 07:05 )             46.4     11-21    140  |  107  |  38<H>  ----------------------------<  250<H>  4.3   |  25  |  2.14<H>    Ca    9.1      21 Nov 2022 07:05          CAPILLARY BLOOD GLUCOSE      POCT Blood Glucose.: 209 mg/dL (21 Nov 2022 11:38)  POCT Blood Glucose.: 187 mg/dL (21 Nov 2022 08:41)  POCT Blood Glucose.: 192 mg/dL (20 Nov 2022 21:39)  POCT Blood Glucose.: 217 mg/dL (20 Nov 2022 16:25)        RADIOLOGY & ADDITIONAL TESTS:    Imaging  Reviewed:  YES  < from: CT Chest No Cont (11.20.22 @ 10:18) >  IMPRESSION:  A 6 mm left lower lobe nodular opacity with adjacent groundglass   opacities may be infectious or inflammatory, however, Fleischner society   recommendations for incidental pulmonary nodule follow-up:    >4-6mm:  Non-smoker: 12 month follow-up chest CT recommended and if unchanged, no   further follow-up necessary.  Smoker: Initial follow-up chest CT at 6-12 months then at 18-24 months if   no change.    < end of copied text >    < from: Xray Chest 1 View- PORTABLE-Urgent (11.18.22 @ 15:50) >  Impression:    No acute pulmonary disease.    < end of copied text >    Consultant(s) Notes Reviewed:   YES      Plan of care was discussed with patient and /or primary care giver; all questions and concerns were addressed

## 2022-11-21 NOTE — CONSULT NOTE ADULT - SUBJECTIVE AND OBJECTIVE BOX
CHIEF COMPLAINT:Patient is a 87y old  Male who presents with a chief complaint of CHF exacerbation.      HPI:  87-year-old male hx of HTN, HLD, DM2, CKD ,COPD, Afib, CAD (s/p stent 7/22) presenting with cough and progressive dyspnea on exertion x 2 weeks. Patient interviewed with daughter at bedside for collateral. Patient endorses flu-like sypmtoms for the past 2 weeks including cough and phlegm that he has difficulty clearing out. Since then he has had dyspnea on exertion and cannot even walk one block without stopping 3 times to catch his breath. Dyspnea is associated with pressure and heaviness in his chest which reminds of him of his previous symptoms prior to his cardiac cath, Patient saw his PCP who sent him for evaluation of PE. ROS positive for occasional palpitations and mild leg swelling L>R. Patient denies fever, chills, headache, dizziness, hemoptysis, n/v/d/c,  complaints or rashes. NKDA    In ED:  VS afebrile, 170/90 BP, 99 HR, 20 RR SpO2 99% on RA  labs wnl   troponin negx1   EKG: rate controlled Afib  CXR: cardiomegaly, bilateral infiltrates (wet-read)  US doppler LE: no DVT  (18 Nov 2022 21:20)      PAST MEDICAL & SURGICAL HISTORY:  KENRICK and COPD overlap syndrome      H/O: HTN (hypertension)      Renal cell cancer  s/p R nephrectomy 7 years ago      DM2 (diabetes mellitus, type 2)      HLD (hyperlipidemia)      CKD (chronic kidney disease)      Parox atrial fibrillation  on Xarelto      BPH (benign prostatic hyperplasia)      CAD (coronary artery disease)      H/O right nephrectomy      S/P cholecystectomy          MEDICATIONS  (STANDING):  aMIOdarone    Tablet 200 milliGRAM(s) Oral daily  amLODIPine   Tablet 5 milliGRAM(s) Oral daily  atorvastatin 10 milliGRAM(s) Oral at bedtime  budesonide  80 MICROgram(s)/formoterol 4.5 MICROgram(s) Inhaler 2 Puff(s) Inhalation two times a day  clopidogrel Tablet 75 milliGRAM(s) Oral daily  hydrALAZINE 50 milliGRAM(s) Oral three times a day  insulin lispro (ADMELOG) corrective regimen sliding scale   SubCutaneous three times a day before meals  insulin lispro (ADMELOG) corrective regimen sliding scale   SubCutaneous at bedtime  metoprolol tartrate 12.5 milliGRAM(s) Oral two times a day  rivaroxaban 15 milliGRAM(s) Oral with dinner  tamsulosin 0.4 milliGRAM(s) Oral at bedtime    MEDICATIONS  (PRN):  acetaminophen     Tablet .. 650 milliGRAM(s) Oral every 6 hours PRN Temp greater or equal to 38C (100.4F), Mild Pain (1 - 3)  albuterol    90 MICROgram(s) HFA Inhaler 2 Puff(s) Inhalation every 6 hours PRN Shortness of Breath and/or Wheezing  melatonin 3 milliGRAM(s) Oral at bedtime PRN Insomnia  ondansetron Injectable 4 milliGRAM(s) IV Push every 8 hours PRN Nausea and/or Vomiting      FAMILY HISTORY:  FH: heart attack (Mother)        SOCIAL HISTORY:    [x ] Non-smoker    [ x] Alcohol-denies    Allergies    No Known Allergies    Intolerances    	    REVIEW OF SYSTEMS:  CONSTITUTIONAL: No fever, weight loss, or fatigue  EYES: No eye pain, visual disturbances, or discharge  ENT:  No difficulty hearing, tinnitus, vertigo; No sinus or throat pain  NECK: No pain or stiffness  RESPIRATORY: No cough, wheezing, chills or hemoptysis; + Shortness of Breath  CARDIOVASCULAR: No chest pain, palpitations, passing out, dizziness, or leg swelling  GASTROINTESTINAL: No abdominal or epigastric pain. No nausea, vomiting, or hematemesis; No diarrhea or constipation. No melena or hematochezia.  GENITOURINARY: No dysuria, frequency, hematuria, or incontinence  NEUROLOGICAL: No headaches, memory loss, loss of strength, numbness, or tremors  SKIN: No itching, burning, rashes, or lesions   LYMPH Nodes: No enlarged glands  ENDOCRINE: No heat or cold intolerance; No hair loss  MUSCULOSKELETAL: No joint pain or swelling; No muscle, back, or extremity pain  PSYCHIATRIC: No depression, anxiety, mood swings, or difficulty sleeping  HEME/LYMPH: No easy bruising, or bleeding gums  ALLERGY AND IMMUNOLOGIC: No hives or eczema	      PHYSICAL EXAM:  T(C): 37 (11-19-22 @ 11:43), Max: 37 (11-19-22 @ 11:43)  HR: 90 (11-19-22 @ 11:43) (74 - 90)  BP: 156/75 (11-19-22 @ 11:43) (142/69 - 160/82)  RR: 18 (11-19-22 @ 11:43) (18 - 19)  SpO2: 95% (11-19-22 @ 11:43) (94% - 97%)  Wt(kg): --  I&O's Summary      Appearance: Normal	  HEENT:   Normal oral mucosa, PERRL, EOMI	  Lymphatic: No lymphadenopathy  Cardiovascular: Normal S1 S2, No JVD, No murmurs, No edema  Respiratory: Lungs clear to auscultation	  Psychiatry: A & O x 3, Mood & affect appropriate  Gastrointestinal:  Soft, Non-tender, + BS	  Skin: No rashes, No ecchymoses, No cyanosis	  Neurologic: Non-focal  Extremities: Normal range of motion, No clubbing, cyanosis or edema  Vascular: Peripheral pulses palpable 2+ bilaterally    	    ECG:  	sinus tac,q v1 and v2    LABS:	 	    Troponin I, High Sensitivity Result: 56.2 ng/L (11-19-22 @ 05:00)  Troponin I, High Sensitivity Result: 24.2 ng/L (11-18-22 @ 13:45)                          15.4   5.46  )-----------( 175      ( 19 Nov 2022 05:00 )             45.4     11-19    140  |  105  |  19<H>  ----------------------------<  107<H>  3.7   |  27  |  1.66<H>    Ca    8.9      19 Nov 2022 05:00    TPro  7.2  /  Alb  3.4<L>  /  TBili  0.6  /  DBili  x   /  AST  18  /  ALT  24  /  AlkPhos  66  11-18    proBNP: Serum Pro-Brain Natriuretic Peptide: 1008 pg/mL (11-18 @ 13:45)    < from: Transthoracic Echocardiogram (07.19.22 @ 07:16) >  OBSERVATIONS:  Mitral Valve: Normal mitral valve. Mild mitral  regurgitation.  Aortic Root: Aortic Root: 4.4 cm.    Aortic Valve: Normal trileaflet aortic valve.  Left Atrium: Normal left atrium. LA volume index = 25  cc/m2.  Left Ventricle: Normal left ventricular systolic function.  Moderate concentric left ventricular hypertrophy. Unable to  adequately assess diastolic function due to technical  aspects of this study.  Right Heart: Normalright atrium. Normal right ventricular  size and systolic function (TAPSE 2.3 cm). There is mild  tricuspid regurgitation. There is mild pulmonic  regurgitation.  Pericardium/PleuraNormal pericardium with no pericardial  effusion.  Hemodynamic: RV systolic pressure is moderately increased  at  48 mm Hg.    < end of copied text >  < from: Cardiac Catheterization (07.25.22 @ 17:20) >  Cath Lab Report    Diagnostic Cardiologist:       Siomara Reyes MD   Fellow:                        Joce Luna   Referring Physician:           Eva Almeida MD     Procedures Performed   Procedures:              1.    Arterial Access - Right Radial     2.    Diagnostic Coronary Angiography     Indications:               Myocardial infarction without ST elevation  (NSTEMI)    Diagnostic Conclusions:     Severe stenosis of large dominant RCA   Severe stenosis of OM-1/LCx   Recommendations:     Staged PCI  when renal function are better     Acute complication:    No complications     Presentation:   NSTEMI. Afib. CKD with GREGORY     Procedure Narrative:   The risks and alternatives of the procedures and conscious sedation  were explained to the patient and informedconsent was  obtained. The patient was brought to the cath lab and placed on the  exam table.  Access   Right radial artery:   The puncture site was infiltrated with 2% Lidocaine. Vascular access  was obtained using modified seldinger technique and a6  Fr. Radial Glidesheath Slender was advanced into the vessel.      Diagnostic Findings:     Coronary Angiography   The coronary circulation is right dominant.      LM   Left main artery: There is a 30 % stenosis.      Patient: VIRGEN AZEVEDO     MRN: [id number]  Study Date: 07/25/2022   05:20 PM      Page 1 of 3          LAD   Left anterior descending artery: There is a 30 % stenosis.      CX   Circumflex: There is a 95 % stenosis. First obtuse marginal: There is  a 90 % stenosis.    RCA   Right coronary artery: There is a 99 % stenosis.      X-Ray:   Diagnostic Flouro time:      11.1 min.                   Exam record  DAP:  Total Flouro Time:           11.1 min.                   Exam total  DAP:    Exam Start Time:   05:20 PM   ExamEnd Time:     05:42 PM   Exam Duration:     22 min     Contrast:   Description                     Dose         Unit        Serial No.   Omnipaque 350mg 150ml             50.000   BX   Contrast Bottle   Omnipaque 350mg 100ml             15.000   BX   Contrast Bottle     Medication:   Description                  Dose, Unit, Route, Time   lidocaine 2% Injectable      3.0, ml, Subcut, 17:21:01   (Lidocaine)   niCARdipine 2.5 mG/mL vial   400.0, mcg, IA, 17:22:17   (Cardene)   heparin (porcine) 1,000      4000.0, units, IV, 17:24:17   Unit(s)/mL Additive (Heparin)     Normal Saline 0.9% (Bolus)   16.0, ml, IV, 17:46:46     Inventory:   Description                   Quantity     Sai#  Reference No.    Serial No.      Lot No.       CDM  .035 Radial Guidewire          1.000         [id number]   [id number]  [id number]*    594   Angio Pack                     1.000         [id number]  [id number]                                    [id number]*    720   Sodium Heparin                 1.000     [id number]  [id number]                                   [id number]*    490   Omnipaque 350mg 150ml          1.000         [id number]   [id number]  [id number]*  Contrast Bottle                              289   6 Fr. Radial Glidesheath       1.000         [id number]     [id number]*  Slender                                      208     Patient: VIRGEN AZEVEDO            MRN: [id number]  Study Date: 07/25/2022   05:20 PM      Page 2 of 3          Vascular Solutions 27cm 1.000 [id number] 3527 [id number]*   Radial Band                                  881   5 Fr. DxTerity TRA             1.000         [id number]  [id number]                                     [id number]*    561   Slippery Laureate .035 X       1.000         [id number]  [id number]                                   [id number]*  150cm Angled                                 950   260cm Exchange Wire            1.000         [id number]  [id number]                                    [id number]*  Starter                             368                          1.000         [id number]   [id number]  [id number]*    149   JL3.5 Sheathless               1.000         [id number]  [id number]                                 1612118*    817   Omnipaque 350mg 100ml          1.000         [id number]   Y-542  [id number]*  Contrast Bottle                              139   5 Fr. FR4 125cm Expo           1.000         [id number]  [id number]                                 [id number]*    140   Hemodynamic Pressures:   Phase          Location           [mmHg]                [mmHg]  [mmHg]            HR  Baseline      Ao                  s      129                d       73  m       96         75  Flow Calculations, Phase: Baseline   CO                               HR  O2Insp  CI                               PO2  O2Exp  SV                               VO2                  278.75 ml/min  O2(ExAir)  SVI                              A-VO2                              Hb    Shunts:   Qs                               Qs Index   Qp                               Qp Index  Qp/Qs  EPBF                             EPBF Index   L-R                              L-R Index   R-L                              R-L Index     Conscious sedation was administered and monitored by Cardiology  nursing staff,  under my direct supervision when applicable.     Electronically signed by Siomara Reyes MD on 07/29/2022 at 09:48 AM     Patient: VIRGEN AZEVEDO            MRN: [id number]  Study Date: 07/25/2022   05:20 PM      Page 3 of 3      
Chief complain/HPI  History of CKD due to Hypertension and diabetes and post right nephrectomy due to renal carcinoma  History of COPD , he came to the ER for  increased dyspnea and admitted with Exacerbation of COPD  He was placed on diuretics in the ER, Ssteroids and nebulizers.  SOB improved in am      PAST MEDICAL & SURGICAL HISTORY:  KENRICK and COPD overlap syndrome      H/O: HTN (hypertension)      Renal cell cancer  s/p R nephrectomy 7 years ago      DM2 (diabetes mellitus, type 2)      HLD (hyperlipidemia)      CKD (chronic kidney disease)      Chronic atrial fibrillation  on Xarelto      BPH (benign prostatic hyperplasia)      CAD (coronary artery disease)      H/O right nephrectomy      S/P cholecystectomy          Home Medications Reviewed  Home Medications:   * Patient Currently Takes Medications as of 18-Nov-2022 22:56 documented in Structured Notes  · 	Metoprolol Tartrate 25 mg oral tablet: Last Dose Taken:  , 0.5 tab(s) orally 2 times a day   · 	clopidogrel 75 mg oral tablet: Last Dose Taken:  , 1 tab(s) orally once a day  · 	amiodarone 200 mg oral tablet: Last Dose Taken:  , 1 tab(s) orally once a day  · 	hydrALAZINE 50 mg oral tablet: Last Dose Taken:  , 1 tab(s) orally 3 times a day  · 	Farxiga 10 mg oral tablet: Last Dose Taken:  , 1 tab(s) orally once a day  · 	amLODIPine 5 mg oral tablet: Last Dose Taken:  , 1 tab(s) orally once a day  · 	meloxicam 7.5 mg oral tablet: Last Dose Taken:  , 1 tab(s) orally once a day  · 	atorvastatin 10 mg oral tablet: Last Dose Taken:  , 1 tab(s) orally once a day (at bedtime)  · 	Vascepa 1 g oral capsule: Last Dose Taken:  , 1  orally once a day  · 	montelukast 10 mg oral tablet: Last Dose Taken:  , 1 tab(s) orally once a day  · 	Januvia 50 mg oral tablet: Last Dose Taken:  , 1 tab(s) orally once a day  · 	Xarelto 15 mg oral tablet: Last Dose Taken:  , 1 tab(s) orally once a day (in the evening)  · 	Breo Ellipta 100 mcg-25 mcg/inh inhalation powder: Last Dose Taken:  , 1 puff(s) inhaled once a day  · 	Symbicort 80 mcg-4.5 mcg/inh inhalation aerosol: Last Dose Taken:  , 2 puff(s) inhaled 2 times a day  · 	tamsulosin 0.4 mg oral capsule: Last Dose Taken:  , 1 cap(s) orally once a day  Hospital Medications:   MEDICATIONS  (STANDING):  aMIOdarone    Tablet 200 milliGRAM(s) Oral daily  amLODIPine   Tablet 5 milliGRAM(s) Oral daily  atorvastatin 10 milliGRAM(s) Oral at bedtime  budesonide  80 MICROgram(s)/formoterol 4.5 MICROgram(s) Inhaler 2 Puff(s) Inhalation two times a day  clopidogrel Tablet 75 milliGRAM(s) Oral daily  guaiFENesin ER 1200 milliGRAM(s) Oral every 12 hours  hydrALAZINE 50 milliGRAM(s) Oral three times a day  insulin lispro (ADMELOG) corrective regimen sliding scale   SubCutaneous three times a day before meals  insulin lispro (ADMELOG) corrective regimen sliding scale   SubCutaneous at bedtime  isosorbide   mononitrate ER Tablet (IMDUR) 30 milliGRAM(s) Oral daily  methylPREDNISolone sodium succinate Injectable 40 milliGRAM(s) IV Push every 8 hours  metoprolol tartrate 12.5 milliGRAM(s) Oral two times a day  rivaroxaban 15 milliGRAM(s) Oral with dinner  tamsulosin 0.4 milliGRAM(s) Oral at bedtime    MEDICATIONS  (PRN):  acetaminophen     Tablet .. 650 milliGRAM(s) Oral every 6 hours PRN Temp greater or equal to 38C (100.4F), Mild Pain (1 - 3)  albuterol    90 MICROgram(s) HFA Inhaler 2 Puff(s) Inhalation every 6 hours PRN Shortness of Breath and/or Wheezing  melatonin 3 milliGRAM(s) Oral at bedtime PRN Insomnia  ondansetron Injectable 4 milliGRAM(s) IV Push every 8 hours PRN Nausea and/or Vomiting      Allergies    No Known Allergies    · Tobacco use	former smoker, 2 packs per day, quit 40 years ago,  · Social History (marital status, living situation, occupation, and sexual history)	Patient lives at home with family, ambulates independently                              15.3   17.94 )-----------( 235      ( 21 Nov 2022 07:05 )             46.4     11-21    140  |  107  |  38<H>  ----------------------------<  250<H>  4.3   |  25  |  2.14<H>    Ca    9.1      21 Nov 2022 07:05          Sodium, Random Urine: 15 mmol/L (11-21 @ 14:19)  Potassium, Random Urine: 46 mmol/L (11-21 @ 14:19)        RADIOLOGY & ADDITIONAL STUDIES:  < from: Xray Chest 1 View- PORTABLE-Urgent (11.18.22 @ 15:50) >  FINDINGS:  Heart/Vascular: The mediastinum, hilum and aorta are within normal limits   for projection. Cardiomegaly.  Pulmonary: Midline trachea. There is no focal infiltrate, congestion or   effusion.    Bones: There is no fracture.  Lines and catheter: None    Impression:    No acute pulmonary disease.    --- End of Report ---    < end of copied text >    SOCIAL HISTORY: Denies ETOh,Smoking,     FAMILY HISTORY:  FH: heart attack (Mother)        REVIEW OF SYSTEMS:  CONSTITUTIONAL: No malaise, No fatigue, No fevers or chills, well developed, no diaphoresis  EYES/ENT: No visual changes;  No vertigo or throat pain     RESPIRATORY: No cough, wheezing, hemoptysis; No shortness of breath  CARDIOVASCULAR: No chest pain or palpitations. No edema  GASTROINTESTINAL: No abdominal or epigastric pain. No nausea, vomiting, or hematemesis; No diarrhea or constipation. No melena or hematochezia.  GENITOURINARY: No dysuria, frequency, foamy urine, urinary urgency, nocturia yes  NEUROLOGICAL: No numbness or weakness, No tremor      VITALS:  Vital Signs Last 24 Hrs  T(C): 36.4 (21 Nov 2022 13:56), Max: 36.9 (21 Nov 2022 05:08)  T(F): 97.5 (21 Nov 2022 13:56), Max: 98.4 (21 Nov 2022 05:08)  HR: 84 (21 Nov 2022 13:56) (80 - 84)  BP: 128/78 (21 Nov 2022 13:56) (108/69 - 133/67)  BP(mean): --  RR: 17 (21 Nov 2022 13:56) (17 - 18)  SpO2: 94% (21 Nov 2022 13:56) (92% - 99%)    Parameters below as of 21 Nov 2022 05:08  Patient On (Oxygen Delivery Method): BiPAP/CPAP            PHYSICAL EXAM:  Constitutional: NAD  HEENT: anicteric sclera, oropharynx clear, MMM  Neck: No JVD  Respiratory: good air entrance B/L, no wheezes, rales or rhonchi  Cardiovascular: S1, S2, RRR, no pericardial rub, no murmur  Gastrointestinal: BS+, soft, no tenderness, no distension, no bruit  Pelvis: bladder non-distended, no CVA tenderness  Extremities: No peripheral edema  Neurological: A/O x 3, no focal deficits  Psychiatric: Normal mood, normal affect                      
PULMONARY CONSULT NOTE      VIRGEN AZEVEDO  MRN-[id number]    History of Present Illness:  Reason for Admission: CHF exacerbation  History of Present Illness:   87-year-old male hx of HTN, HLD, DM2, CKD ,COPD, KENRICK,  Afib, CAD (s/p stent 7/22) presenting with cough and progressive dyspnea on exertion x 2 weeks. Patient interviewed with daughter at bedside for collateral. Patient endorses flu-like sypmtoms for the past 2 weeks including cough and phlegm that he has difficulty clearing out. Since then he has had dyspnea on exertion and cannot even walk one block without stopping 3 times to catch his breath. Dyspnea is associated with pressure and heaviness in his chest which reminds of him of his previous symptoms prior to his cardiac cath, Patient saw his PCP who sent him for evaluation of PE. ROS positive for occasional palpitations and mild leg swelling L>R. Patient denies fever, chills, headache, dizziness, hemoptysis, n/v/d/c,  complaints or rashes. NKDA        HISTORY OF PRESENT ILLNESS: As Above. Awake, alert, comfortable in bed in NAD.    MEDICATIONS  (STANDING):  aMIOdarone    Tablet 200 milliGRAM(s) Oral daily  amLODIPine   Tablet 5 milliGRAM(s) Oral daily  atorvastatin 10 milliGRAM(s) Oral at bedtime  budesonide  80 MICROgram(s)/formoterol 4.5 MICROgram(s) Inhaler 2 Puff(s) Inhalation two times a day  clopidogrel Tablet 75 milliGRAM(s) Oral daily  hydrALAZINE 50 milliGRAM(s) Oral three times a day  insulin lispro (ADMELOG) corrective regimen sliding scale   SubCutaneous three times a day before meals  insulin lispro (ADMELOG) corrective regimen sliding scale   SubCutaneous at bedtime  metoprolol tartrate 12.5 milliGRAM(s) Oral two times a day  rivaroxaban 15 milliGRAM(s) Oral with dinner  tamsulosin 0.4 milliGRAM(s) Oral at bedtime      MEDICATIONS  (PRN):  acetaminophen     Tablet .. 650 milliGRAM(s) Oral every 6 hours PRN Temp greater or equal to 38C (100.4F), Mild Pain (1 - 3)  albuterol    90 MICROgram(s) HFA Inhaler 2 Puff(s) Inhalation every 6 hours PRN Shortness of Breath and/or Wheezing  melatonin 3 milliGRAM(s) Oral at bedtime PRN Insomnia  ondansetron Injectable 4 milliGRAM(s) IV Push every 8 hours PRN Nausea and/or Vomiting      Allergies    No Known Allergies    Intolerances        PAST MEDICAL & SURGICAL HISTORY:  KENRICK and COPD overlap syndrome      H/O: HTN (hypertension)      Renal cell cancer  s/p R nephrectomy 7 years ago      DM2 (diabetes mellitus, type 2)      HLD (hyperlipidemia)      CKD (chronic kidney disease)      Chronic atrial fibrillation  on Xarelto      BPH (benign prostatic hyperplasia)      CAD (coronary artery disease)      H/O right nephrectomy      S/P cholecystectomy          FAMILY HISTORY:  FH: heart attack (Mother)        SOCIAL HISTORY  Smoking History:     REVIEW OF SYSTEMS:    CONSTITUTIONAL:  No fevers, chills, sweats    HEENT:  Eyes:  No diplopia or blurred vision. ENT:  No earache, sore throat or runny nose.    CARDIOVASCULAR:  No pressure, squeezing, tightness, or heaviness about the chest; no palpitations.    RESPIRATORY:  Per HPI    GASTROINTESTINAL:  No abdominal pain, nausea, vomiting or diarrhea.    GENITOURINARY:  No dysuria, frequency or urgency.    NEUROLOGIC:  No paresthesias, fasciculations, seizures or weakness.    PSYCHIATRIC:  No disorder of thought or mood.    Vital Signs Last 24 Hrs  T(C): 36.4 (19 Nov 2022 07:39), Max: 36.6 (18 Nov 2022 20:30)  T(F): 97.6 (19 Nov 2022 07:39), Max: 97.8 (18 Nov 2022 20:30)  HR: 74 (19 Nov 2022 07:39) (74 - 99)  BP: 142/69 (19 Nov 2022 07:39) (142/69 - 170/90)  BP(mean): --  RR: 18 (19 Nov 2022 07:39) (18 - 20)  SpO2: 97% (19 Nov 2022 07:39) (94% - 97%)    Parameters below as of 19 Nov 2022 07:39  Patient On (Oxygen Delivery Method): room air      I&O's Detail      PHYSICAL EXAMINATION:    GENERAL: The patient is a well-developed, well-nourished _____in no apparent distress.     HEENT: Head is normocephalic and atraumatic. Extraocular muscles are intact. Mucous membranes are moist.     NECK: Supple.     LUNGS: +Wheezes B/L    HEART: Regular rate and rhythm without murmur.    ABDOMEN: Soft, nontender, and nondistended.  No hepatosplenomegaly is noted.    EXTREMITIES: Without any cyanosis, clubbing, rash, lesions + edema.    NEUROLOGIC: Grossly intact.      LABS:                        15.4   5.46  )-----------( 175      ( 19 Nov 2022 05:00 )             45.4     11-19    140  |  105  |  19<H>  ----------------------------<  107<H>  3.7   |  27  |  1.66<H>    Ca    8.9      19 Nov 2022 05:00    TPro  7.2  /  Alb  3.4<L>  /  TBili  0.6  /  DBili  x   /  AST  18  /  ALT  24  /  AlkPhos  66  11-18    PT/INR - ( 18 Nov 2022 21:07 )   PT: 15.1 sec;   INR: 1.27 ratio         PTT - ( 18 Nov 2022 21:07 )  PTT:31.4 sec          D-Dimer Assay, Quantitative: 181 ng/mL DDU (11-18-22 @ 21:07)    Serum Pro-Brain Natriuretic Peptide: 1008 pg/mL (11-18-22 @ 13:45)          MICROBIOLOGY:    RADIOLOGY & ADDITIONAL STUDIES:  < from: US Duplex Venous Lower Ext Complete, Bilateral (11.18.22 @ 16:28) >    IMPRESSION:  No evidence of deep venous thrombosis in either lower extremity.      < end of copied text >    CXR:  < from: Xray Chest 1 View- PORTABLE-Urgent (11.18.22 @ 15:50) >  Impression:    No acute pulmonary disease.    --- End of Report ---    < end of copied text >    Ct scan chest;    ekg;    echo:

## 2022-11-21 NOTE — PROGRESS NOTE ADULT - SUBJECTIVE AND OBJECTIVE BOX
CHIEF COMPLAINT:Patient is a 87y old  Male who presents with a chief complaint of CHF exacerbation.Pt appears comfortable.    	  REVIEW OF SYSTEMS:  CONSTITUTIONAL: No fever, weight loss, or fatigue  EYES: No eye pain, visual disturbances, or discharge  ENT:  No difficulty hearing, tinnitus, vertigo; No sinus or throat pain  NECK: No pain or stiffness  RESPIRATORY: No cough, wheezing, chills or hemoptysis; No Shortness of Breath  CARDIOVASCULAR: No chest pain, palpitations, passing out, dizziness, or leg swelling  GASTROINTESTINAL: No abdominal or epigastric pain. No nausea, vomiting, or hematemesis; No diarrhea or constipation. No melena or hematochezia.  GENITOURINARY: No dysuria, frequency, hematuria, or incontinence  NEUROLOGICAL: No headaches, memory loss, loss of strength, numbness, or tremors  SKIN: No itching, burning, rashes, or lesions   LYMPH Nodes: No enlarged glands  ENDOCRINE: No heat or cold intolerance; No hair loss  MUSCULOSKELETAL: No joint pain or swelling; No muscle, back, or extremity pain  PSYCHIATRIC: No depression, anxiety, mood swings, or difficulty sleeping  HEME/LYMPH: No easy bruising, or bleeding gums  ALLERGY AND IMMUNOLOGIC: No hives or eczema	        PHYSICAL EXAM:  T(C): 36.9 (11-21-22 @ 05:08), Max: 36.9 (11-21-22 @ 05:08)  HR: 83 (11-21-22 @ 05:08) (80 - 98)  BP: 133/67 (11-21-22 @ 05:08) (108/69 - 150/66)  RR: 18 (11-20-22 @ 20:08) (17 - 18)  SpO2: 98% (11-21-22 @ 08:50) (92% - 99%)  Wt(kg): --  I&O's Summary      Appearance: Normal	  HEENT:   Normal oral mucosa, PERRL, EOMI	  Lymphatic: No lymphadenopathy  Cardiovascular: Normal S1 S2, No JVD, No murmurs, No edema  Respiratory: Lungs clear to auscultation	  Psychiatry: A & O x 3, Mood & affect appropriate  Gastrointestinal:  Soft, Non-tender, + BS	  Skin: No rashes, No ecchymoses, No cyanosis	  Neurologic: Non-focal  Extremities: Normal range of motion, No clubbing, cyanosis or edema  Vascular: Peripheral pulses palpable 2+ bilaterally    MEDICATIONS  (STANDING):  aMIOdarone    Tablet 200 milliGRAM(s) Oral daily  amLODIPine   Tablet 5 milliGRAM(s) Oral daily  atorvastatin 10 milliGRAM(s) Oral at bedtime  budesonide  80 MICROgram(s)/formoterol 4.5 MICROgram(s) Inhaler 2 Puff(s) Inhalation two times a day  clopidogrel Tablet 75 milliGRAM(s) Oral daily  hydrALAZINE 50 milliGRAM(s) Oral three times a day  insulin lispro (ADMELOG) corrective regimen sliding scale   SubCutaneous three times a day before meals  insulin lispro (ADMELOG) corrective regimen sliding scale   SubCutaneous at bedtime  isosorbide   mononitrate ER Tablet (IMDUR) 30 milliGRAM(s) Oral daily  methylPREDNISolone sodium succinate Injectable 40 milliGRAM(s) IV Push every 8 hours  metoprolol tartrate 12.5 milliGRAM(s) Oral two times a day  rivaroxaban 15 milliGRAM(s) Oral with dinner  tamsulosin 0.4 milliGRAM(s) Oral at bedtime        	  LABS:	 	    Troponin I, High Sensitivity Result: 56.2 ng/L (11-19 @ 05:00)  Troponin I, High Sensitivity Result: 24.2 ng/L (11-18 @ 13:45)                            15.3   17.94 )-----------( 235      ( 21 Nov 2022 07:05 )             46.4     11-21    140  |  107  |  38<H>  ----------------------------<  250<H>  4.3   |  25  |  2.14<H>    Ca    9.1      21 Nov 2022 07:05      proBNP: Serum Pro-Brain Natriuretic Peptide: 1008 pg/mL (11-18 @ 13:45)

## 2022-11-21 NOTE — PROGRESS NOTE ADULT - SUBJECTIVE AND OBJECTIVE BOX
Patient is a 87y old  Male who presents with a chief complaint of CHF exacerbation (21 Nov 2022 06:14)    Patient is awake, alert, comfortable, laying in bed, not in acute distress, doing well on RA.     INTERVAL HPI/OVERNIGHT EVENTS:      VITAL SIGNS:  T(F): 98.4 (11-21-22 @ 05:08)  HR: 83 (11-21-22 @ 05:08)  BP: 133/67 (11-21-22 @ 05:08)  RR: 18 (11-20-22 @ 20:08)  SpO2: 98% (11-21-22 @ 08:50)  Wt(kg): --  I&O's Detail          REVIEW OF SYSTEMS:    CONSTITUTIONAL:  No fevers, chills, sweats    HEENT:  Eyes:  No diplopia or blurred vision. ENT:  No earache, sore throat or runny nose.    CARDIOVASCULAR:  No pressure, squeezing, tightness, or heaviness about the chest; no palpitations.    RESPIRATORY:  Per HPI    GASTROINTESTINAL:  No abdominal pain, nausea, vomiting or diarrhea.    GENITOURINARY:  No dysuria, frequency or urgency.    NEUROLOGIC:  No paresthesias, fasciculations, seizures or weakness.    PSYCHIATRIC:  No disorder of thought or mood.      PHYSICAL EXAM:    Constitutional: Well developed and nourished  Eyes:Perrla  ENMT: normal  Neck:supple  Respiratory: good air entry  Cardiovascular: S1 S2 regular  Gastrointestinal: Soft, Non tender  Extremities: No edema  Vascular:normal  Neurological:Awake, alert,Ox3  Musculoskeletal:Normal      MEDICATIONS  (STANDING):  aMIOdarone    Tablet 200 milliGRAM(s) Oral daily  amLODIPine   Tablet 5 milliGRAM(s) Oral daily  atorvastatin 10 milliGRAM(s) Oral at bedtime  budesonide  80 MICROgram(s)/formoterol 4.5 MICROgram(s) Inhaler 2 Puff(s) Inhalation two times a day  clopidogrel Tablet 75 milliGRAM(s) Oral daily  hydrALAZINE 50 milliGRAM(s) Oral three times a day  insulin lispro (ADMELOG) corrective regimen sliding scale   SubCutaneous three times a day before meals  insulin lispro (ADMELOG) corrective regimen sliding scale   SubCutaneous at bedtime  isosorbide   mononitrate ER Tablet (IMDUR) 30 milliGRAM(s) Oral daily  methylPREDNISolone sodium succinate Injectable 40 milliGRAM(s) IV Push every 8 hours  metoprolol tartrate 12.5 milliGRAM(s) Oral two times a day  rivaroxaban 15 milliGRAM(s) Oral with dinner  tamsulosin 0.4 milliGRAM(s) Oral at bedtime    MEDICATIONS  (PRN):  acetaminophen     Tablet .. 650 milliGRAM(s) Oral every 6 hours PRN Temp greater or equal to 38C (100.4F), Mild Pain (1 - 3)  albuterol    90 MICROgram(s) HFA Inhaler 2 Puff(s) Inhalation every 6 hours PRN Shortness of Breath and/or Wheezing  melatonin 3 milliGRAM(s) Oral at bedtime PRN Insomnia  ondansetron Injectable 4 milliGRAM(s) IV Push every 8 hours PRN Nausea and/or Vomiting      Allergies    No Known Allergies    Intolerances        LABS:                        15.3   17.94 )-----------( 235      ( 21 Nov 2022 07:05 )             46.4     11-21    140  |  107  |  38<H>  ----------------------------<  250<H>  4.3   |  25  |  2.14<H>    Ca    9.1      21 Nov 2022 07:05                CAPILLARY BLOOD GLUCOSE      POCT Blood Glucose.: 187 mg/dL (21 Nov 2022 08:41)  POCT Blood Glucose.: 192 mg/dL (20 Nov 2022 21:39)  POCT Blood Glucose.: 217 mg/dL (20 Nov 2022 16:25)  POCT Blood Glucose.: 219 mg/dL (20 Nov 2022 11:19)    pro-bnp -- 11-18 @ 21:07     d-dimer 181  11-18 @ 21:07  pro-bnp 1008 11-18 @ 13:45     d-dimer --  11-18 @ 13:45      RADIOLOGY & ADDITIONAL TESTS:    CXR:    Ct scan chest:  < from: CT Chest No Cont (11.20.22 @ 10:18) >  IMPRESSION:  A 6 mm left lower lobe nodular opacity with adjacent groundglass   opacities may be infectious or inflammatory, however, Fleischner society   recommendations for incidental pulmonary nodule follow-up:    >4-6mm:  Non-smoker: 12 month follow-up chest CT recommended and if unchanged, no   further follow-up necessary.  Smoker: Initial follow-up chest CT at 6-12 months then at 18-24 months if   no change.        --- End of Report ---    < end of copied text >      ekg;    echo:  < from: Transthoracic Echocardiogram (07.19.22 @ 07:16) >  CONCLUSIONS:  1. Normal mitral valve. Mild mitral regurgitation.  2. Normal trileaflet aortic valve.  3. Aortic Root: 4.4cm.    4. Normal left atrium.  LA volume index = 25 cc/m2.  5. Moderate concentric left ventricular hypertrophy.  6. Normal left ventricular systolic function.  7. Unable to adequately assess diastolic function due to  technical aspects of this study.  8. Normal right atrium.  9. Normal right ventricular size and systolic function  (TAPSE 2.3 cm).  10. RV systolic pressure is moderately increased at  48 mm  Hg.  11. There is mild tricuspid regurgitation.  12. There is mild pulmonic regurgitation.  13. Normal pericardium with no pericardial effusion.    < end of copied text >

## 2022-11-21 NOTE — CONSULT NOTE ADULT - ASSESSMENT
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    Follow up UA, urine for sodium creatinine and protein.              
87-year-old male hx of HTN, HLD, DM2, CKD ,COPD, Parox Afib, CAD (s/p stent 7/22) presenting with cough and progressive dyspnea on exertion x 2 weeks due to COPD exacerbation.  1.COPD exacerbation-tx as per Pulmonary.  2.PAF-xarelto,amiodarone.  3.CAD-plavix,b blocker,statin.  4.HTN-add imdur 30mg qd, cont hydralazine 50mg q8.  5.CRI-f/u lytes.  6.DM-Insulin.  7.Lipid d/o-statin.  8.CT chest w/o contrast.  9.PPI.

## 2022-11-21 NOTE — PROGRESS NOTE ADULT - SUBJECTIVE AND OBJECTIVE BOX
Patient is a 87y old  Male who presents with a chief complaint of CHF exacerbation (20 Nov 2022 12:09)    pt seen in ed tele [x  ], reg med floor [   ], bed [ x ], chair at bedside [   ], a+o x3 [x  ], lethargic [  ],    nad [ x ]      Allergies    No Known Allergies        Vitals    T(F): 98.4 (11-21-22 @ 05:08), Max: 98.4 (11-21-22 @ 05:08)  HR: 83 (11-21-22 @ 05:08) (80 - 99)  BP: 133/67 (11-21-22 @ 05:08) (108/69 - 150/66)  RR: 18 (11-20-22 @ 20:08) (17 - 18)  SpO2: 98% (11-21-22 @ 05:08) (92% - 99%)  Wt(kg): --  CAPILLARY BLOOD GLUCOSE      POCT Blood Glucose.: 192 mg/dL (20 Nov 2022 21:39)      Labs                          17.3   8.50  )-----------( 214      ( 20 Nov 2022 06:30 )             51.9       11-20    138  |  105  |  26<H>  ----------------------------<  174<H>  4.3   |  25  |  1.82<H>    Ca    9.6      20 Nov 2022 06:30            Troponin I, High Sensitivity Result: 56.2 ng/L (11-19-22 @ 05:00)  Troponin I, High Sensitivity Result: 24.2 ng/L (11-18-22 @ 13:45)        Radiology Results      Meds    MEDICATIONS  (STANDING):  aMIOdarone    Tablet 200 milliGRAM(s) Oral daily  amLODIPine   Tablet 5 milliGRAM(s) Oral daily  atorvastatin 10 milliGRAM(s) Oral at bedtime  budesonide  80 MICROgram(s)/formoterol 4.5 MICROgram(s) Inhaler 2 Puff(s) Inhalation two times a day  clopidogrel Tablet 75 milliGRAM(s) Oral daily  hydrALAZINE 50 milliGRAM(s) Oral three times a day  insulin lispro (ADMELOG) corrective regimen sliding scale   SubCutaneous three times a day before meals  insulin lispro (ADMELOG) corrective regimen sliding scale   SubCutaneous at bedtime  isosorbide   mononitrate ER Tablet (IMDUR) 30 milliGRAM(s) Oral daily  methylPREDNISolone sodium succinate Injectable 40 milliGRAM(s) IV Push every 8 hours  metoprolol tartrate 12.5 milliGRAM(s) Oral two times a day  rivaroxaban 15 milliGRAM(s) Oral with dinner  tamsulosin 0.4 milliGRAM(s) Oral at bedtime      MEDICATIONS  (PRN):  acetaminophen     Tablet .. 650 milliGRAM(s) Oral every 6 hours PRN Temp greater or equal to 38C (100.4F), Mild Pain (1 - 3)  albuterol    90 MICROgram(s) HFA Inhaler 2 Puff(s) Inhalation every 6 hours PRN Shortness of Breath and/or Wheezing  melatonin 3 milliGRAM(s) Oral at bedtime PRN Insomnia  ondansetron Injectable 4 milliGRAM(s) IV Push every 8 hours PRN Nausea and/or Vomiting      Physical Exam    Neuro :  no focal deficits  Respiratory: posterior wheeze B/L  CV: RRR, S1S2, no murmurs,   Abdominal: Soft, NT, ND +BS,  Extremities: No edema, + peripheral pulses      ASSESSMENT    harris,   uncontrolled htn,  r/o acs,   r/o chf,   pe r/o,   poss copd exacerb,   h/o HTN,   CAD (s/p stent 7/22)   KENRICK and COPD overlap syndrome  DM2 (diabetes mellitus, type 2)  HLD (hyperlipidemia)  CKD (chronic kidney disease)  Chronic atrial fibrillation  BPH without urinary obstruction  Renal cell cancer s/p right nephrectomy  S/P cholecystectomy        PLAN    cont tele,   acs protocol,   plavix, statin,   trop x2 neg noted above  cardio f/u   lopressor,   added imdur 30mg qd,   cont hydralazine 50mg q8  f/u echo   cont solumedrol,   albuterol q6 prn,   pulm f/u   f/u ct chest   supplemental O2 prn,   C-pap machine at night  Weight reduction.  d dimer neg noted    lispro ss,   hgba1c 6.3 noted    phys tx eval   cont current meds        Patient is a 87y old  Male who presents with a chief complaint of CHF exacerbation (20 Nov 2022 12:09)    pt seen in tele [  ], reg med floor [  x ], bed [ x ], chair at bedside [   ], a+o x3 [x  ], lethargic [  ],    nad [ x ]      Allergies    No Known Allergies        Vitals    T(F): 98.4 (11-21-22 @ 05:08), Max: 98.4 (11-21-22 @ 05:08)  HR: 83 (11-21-22 @ 05:08) (80 - 99)  BP: 133/67 (11-21-22 @ 05:08) (108/69 - 150/66)  RR: 18 (11-20-22 @ 20:08) (17 - 18)  SpO2: 98% (11-21-22 @ 05:08) (92% - 99%)  Wt(kg): --  CAPILLARY BLOOD GLUCOSE      POCT Blood Glucose.: 192 mg/dL (20 Nov 2022 21:39)      Labs                          17.3   8.50  )-----------( 214      ( 20 Nov 2022 06:30 )             51.9       11-20    138  |  105  |  26<H>  ----------------------------<  174<H>  4.3   |  25  |  1.82<H>    Ca    9.6      20 Nov 2022 06:30            Troponin I, High Sensitivity Result: 56.2 ng/L (11-19-22 @ 05:00)  Troponin I, High Sensitivity Result: 24.2 ng/L (11-18-22 @ 13:45)        Radiology Results    < from: CT Chest No Cont (11.20.22 @ 10:18) >  IMPRESSION:  A 6 mm left lower lobe nodular opacity with adjacent groundglass   opacities may be infectious or inflammatory, however, Fleischner society   recommendations for incidental pulmonary nodule follow-up:    >4-6mm:  Non-smoker: 12 month follow-up chest CT recommended and if unchanged, no   further follow-up necessary.  Smoker: Initial follow-up chest CT at 6-12 months then at 18-24 months if   no change.    < end of copied text >      Meds    MEDICATIONS  (STANDING):  aMIOdarone    Tablet 200 milliGRAM(s) Oral daily  amLODIPine   Tablet 5 milliGRAM(s) Oral daily  atorvastatin 10 milliGRAM(s) Oral at bedtime  budesonide  80 MICROgram(s)/formoterol 4.5 MICROgram(s) Inhaler 2 Puff(s) Inhalation two times a day  clopidogrel Tablet 75 milliGRAM(s) Oral daily  hydrALAZINE 50 milliGRAM(s) Oral three times a day  insulin lispro (ADMELOG) corrective regimen sliding scale   SubCutaneous three times a day before meals  insulin lispro (ADMELOG) corrective regimen sliding scale   SubCutaneous at bedtime  isosorbide   mononitrate ER Tablet (IMDUR) 30 milliGRAM(s) Oral daily  methylPREDNISolone sodium succinate Injectable 40 milliGRAM(s) IV Push every 8 hours  metoprolol tartrate 12.5 milliGRAM(s) Oral two times a day  rivaroxaban 15 milliGRAM(s) Oral with dinner  tamsulosin 0.4 milliGRAM(s) Oral at bedtime      MEDICATIONS  (PRN):  acetaminophen     Tablet .. 650 milliGRAM(s) Oral every 6 hours PRN Temp greater or equal to 38C (100.4F), Mild Pain (1 - 3)  albuterol    90 MICROgram(s) HFA Inhaler 2 Puff(s) Inhalation every 6 hours PRN Shortness of Breath and/or Wheezing  melatonin 3 milliGRAM(s) Oral at bedtime PRN Insomnia  ondansetron Injectable 4 milliGRAM(s) IV Push every 8 hours PRN Nausea and/or Vomiting      Physical Exam    Neuro :  no focal deficits  Respiratory: posterior wheeze B/L  CV: RRR, S1S2, no murmurs,   Abdominal: Soft, NT, ND +BS,  Extremities: No edema, + peripheral pulses      ASSESSMENT    harris,   uncontrolled htn,  r/o acs,   r/o chf,   pe r/o,   poss copd exacerb,   h/o HTN,   CAD (s/p stent 7/22)   KENRICK and COPD overlap syndrome  DM2 (diabetes mellitus, type 2)  HLD (hyperlipidemia)  CKD (chronic kidney disease)  Chronic atrial fibrillation  BPH without urinary obstruction  Renal cell cancer s/p right nephrectomy  S/P cholecystectomy        PLAN    cont tele,   acs protocol,   plavix, statin,   trop x2 neg noted above  cardio f/u   lopressor,   cont imdur 30mg qd,   cont hydralazine 50mg q8  f/u echo   cont solumedrol,   albuterol q6 prn,   pulm f/u   ct chest with A 6 mm left lower lobe nodular opacity with adjacent groundglass opacities may be infectious or inflammatory, however, Fleischner society   recommendations for incidental pulmonary nodule follow-up: >4-6mm: Non-smoker: 12 month follow-up chest CT recommended and if unchanged, no   further follow-up necessary. Smoker: Initial follow-up chest CT at 6-12 months then at 18-24 months if no change noted above.  cont supplemental O2 prn,   C-pap machine at night  Weight reduction.  d dimer neg noted    lispro ss,   hgba1c 6.3 noted    phys tx eval noted and rec no skilled phys tx needs  cont current meds

## 2022-11-22 ENCOUNTER — TRANSCRIPTION ENCOUNTER (OUTPATIENT)
Age: 87
End: 2022-11-22

## 2022-11-22 VITALS
HEART RATE: 91 BPM | SYSTOLIC BLOOD PRESSURE: 122 MMHG | TEMPERATURE: 98 F | OXYGEN SATURATION: 97 % | RESPIRATION RATE: 17 BRPM | DIASTOLIC BLOOD PRESSURE: 69 MMHG

## 2022-11-22 LAB
ALBUMIN SERPL ELPH-MCNC: 3.2 G/DL — LOW (ref 3.5–5)
ALP SERPL-CCNC: 62 U/L — SIGNIFICANT CHANGE UP (ref 40–120)
ALT FLD-CCNC: 31 U/L DA — SIGNIFICANT CHANGE UP (ref 10–60)
ANION GAP SERPL CALC-SCNC: 9 MMOL/L — SIGNIFICANT CHANGE UP (ref 5–17)
AST SERPL-CCNC: 19 U/L — SIGNIFICANT CHANGE UP (ref 10–40)
BILIRUB SERPL-MCNC: 0.7 MG/DL — SIGNIFICANT CHANGE UP (ref 0.2–1.2)
BUN SERPL-MCNC: 40 MG/DL — HIGH (ref 7–18)
CALCIUM SERPL-MCNC: 9.1 MG/DL — SIGNIFICANT CHANGE UP (ref 8.4–10.5)
CHLORIDE SERPL-SCNC: 104 MMOL/L — SIGNIFICANT CHANGE UP (ref 96–108)
CO2 SERPL-SCNC: 25 MMOL/L — SIGNIFICANT CHANGE UP (ref 22–31)
CREAT SERPL-MCNC: 1.96 MG/DL — HIGH (ref 0.5–1.3)
EGFR: 33 ML/MIN/1.73M2 — LOW
GLUCOSE BLDC GLUCOMTR-MCNC: 192 MG/DL — HIGH (ref 70–99)
GLUCOSE BLDC GLUCOMTR-MCNC: 229 MG/DL — HIGH (ref 70–99)
GLUCOSE SERPL-MCNC: 217 MG/DL — HIGH (ref 70–99)
HCT VFR BLD CALC: 50.2 % — HIGH (ref 39–50)
HGB BLD-MCNC: 16.4 G/DL — SIGNIFICANT CHANGE UP (ref 13–17)
MCHC RBC-ENTMCNC: 32 PG — SIGNIFICANT CHANGE UP (ref 27–34)
MCHC RBC-ENTMCNC: 32.7 GM/DL — SIGNIFICANT CHANGE UP (ref 32–36)
MCV RBC AUTO: 98 FL — SIGNIFICANT CHANGE UP (ref 80–100)
NRBC # BLD: 0 /100 WBCS — SIGNIFICANT CHANGE UP (ref 0–0)
OSMOLALITY SERPL: 308 MOSMOL/KG — HIGH (ref 280–301)
PHOSPHATE 24H UR-MCNC: 82.2 MG/DL — SIGNIFICANT CHANGE UP
PLATELET # BLD AUTO: 249 K/UL — SIGNIFICANT CHANGE UP (ref 150–400)
POTASSIUM SERPL-MCNC: 4.6 MMOL/L — SIGNIFICANT CHANGE UP (ref 3.5–5.3)
POTASSIUM SERPL-SCNC: 4.6 MMOL/L — SIGNIFICANT CHANGE UP (ref 3.5–5.3)
PROT SERPL-MCNC: 7.1 G/DL — SIGNIFICANT CHANGE UP (ref 6–8.3)
RBC # BLD: 5.12 M/UL — SIGNIFICANT CHANGE UP (ref 4.2–5.8)
RBC # FLD: 13.1 % — SIGNIFICANT CHANGE UP (ref 10.3–14.5)
SODIUM SERPL-SCNC: 138 MMOL/L — SIGNIFICANT CHANGE UP (ref 135–145)
WBC # BLD: 17.21 K/UL — HIGH (ref 3.8–10.5)
WBC # FLD AUTO: 17.21 K/UL — HIGH (ref 3.8–10.5)

## 2022-11-22 PROCEDURE — 82962 GLUCOSE BLOOD TEST: CPT

## 2022-11-22 PROCEDURE — 80053 COMPREHEN METABOLIC PANEL: CPT

## 2022-11-22 PROCEDURE — 93970 EXTREMITY STUDY: CPT

## 2022-11-22 PROCEDURE — 84105 ASSAY OF URINE PHOSPHORUS: CPT

## 2022-11-22 PROCEDURE — 83880 ASSAY OF NATRIURETIC PEPTIDE: CPT

## 2022-11-22 PROCEDURE — 84484 ASSAY OF TROPONIN QUANT: CPT

## 2022-11-22 PROCEDURE — 85025 COMPLETE CBC W/AUTO DIFF WBC: CPT

## 2022-11-22 PROCEDURE — 84300 ASSAY OF URINE SODIUM: CPT

## 2022-11-22 PROCEDURE — 71250 CT THORAX DX C-: CPT

## 2022-11-22 PROCEDURE — 36415 COLL VENOUS BLD VENIPUNCTURE: CPT

## 2022-11-22 PROCEDURE — 82570 ASSAY OF URINE CREATININE: CPT

## 2022-11-22 PROCEDURE — 97162 PT EVAL MOD COMPLEX 30 MIN: CPT

## 2022-11-22 PROCEDURE — 71045 X-RAY EXAM CHEST 1 VIEW: CPT

## 2022-11-22 PROCEDURE — 81001 URINALYSIS AUTO W/SCOPE: CPT

## 2022-11-22 PROCEDURE — 83930 ASSAY OF BLOOD OSMOLALITY: CPT

## 2022-11-22 PROCEDURE — 94660 CPAP INITIATION&MGMT: CPT

## 2022-11-22 PROCEDURE — 87635 SARS-COV-2 COVID-19 AMP PRB: CPT

## 2022-11-22 PROCEDURE — 93306 TTE W/DOPPLER COMPLETE: CPT

## 2022-11-22 PROCEDURE — 94640 AIRWAY INHALATION TREATMENT: CPT

## 2022-11-22 PROCEDURE — 85027 COMPLETE CBC AUTOMATED: CPT

## 2022-11-22 PROCEDURE — 83935 ASSAY OF URINE OSMOLALITY: CPT

## 2022-11-22 PROCEDURE — 85730 THROMBOPLASTIN TIME PARTIAL: CPT

## 2022-11-22 PROCEDURE — 84133 ASSAY OF URINE POTASSIUM: CPT

## 2022-11-22 PROCEDURE — 85379 FIBRIN DEGRADATION QUANT: CPT

## 2022-11-22 PROCEDURE — 83036 HEMOGLOBIN GLYCOSYLATED A1C: CPT

## 2022-11-22 PROCEDURE — 84156 ASSAY OF PROTEIN URINE: CPT

## 2022-11-22 PROCEDURE — 99285 EMERGENCY DEPT VISIT HI MDM: CPT | Mod: 25

## 2022-11-22 PROCEDURE — 85610 PROTHROMBIN TIME: CPT

## 2022-11-22 PROCEDURE — 80048 BASIC METABOLIC PNL TOTAL CA: CPT

## 2022-11-22 RX ORDER — CEFTRIAXONE 500 MG/1
1000 INJECTION, POWDER, FOR SOLUTION INTRAMUSCULAR; INTRAVENOUS ONCE
Refills: 0 | Status: COMPLETED | OUTPATIENT
Start: 2022-11-22 | End: 2022-11-22

## 2022-11-22 RX ORDER — ISOSORBIDE MONONITRATE 60 MG/1
1 TABLET, EXTENDED RELEASE ORAL
Qty: 30 | Refills: 0
Start: 2022-11-22 | End: 2022-12-21

## 2022-11-22 RX ORDER — CEFUROXIME AXETIL 250 MG
1 TABLET ORAL
Qty: 10 | Refills: 0
Start: 2022-11-22 | End: 2022-11-26

## 2022-11-22 RX ORDER — CEFUROXIME AXETIL 250 MG
250 TABLET ORAL EVERY 12 HOURS
Refills: 0 | Status: DISCONTINUED | OUTPATIENT
Start: 2022-11-23 | End: 2022-11-22

## 2022-11-22 RX ORDER — TIOTROPIUM BROMIDE 18 UG/1
1 CAPSULE ORAL; RESPIRATORY (INHALATION)
Qty: 1 | Refills: 0
Start: 2022-11-22 | End: 2022-12-21

## 2022-11-22 RX ORDER — TIOTROPIUM BROMIDE 18 UG/1
1 CAPSULE ORAL; RESPIRATORY (INHALATION) DAILY
Refills: 0 | Status: DISCONTINUED | OUTPATIENT
Start: 2022-11-22 | End: 2022-11-22

## 2022-11-22 RX ORDER — MELOXICAM 15 MG/1
1 TABLET ORAL
Qty: 0 | Refills: 0 | DISCHARGE

## 2022-11-22 RX ORDER — ACETAMINOPHEN 500 MG
2 TABLET ORAL
Qty: 0 | Refills: 0 | DISCHARGE
Start: 2022-11-22

## 2022-11-22 RX ADMIN — Medication 12.5 MILLIGRAM(S): at 05:49

## 2022-11-22 RX ADMIN — Medication 1: at 08:30

## 2022-11-22 RX ADMIN — Medication 2: at 11:37

## 2022-11-22 RX ADMIN — TIOTROPIUM BROMIDE 1 CAPSULE(S): 18 CAPSULE ORAL; RESPIRATORY (INHALATION) at 11:27

## 2022-11-22 RX ADMIN — CLOPIDOGREL BISULFATE 75 MILLIGRAM(S): 75 TABLET, FILM COATED ORAL at 11:27

## 2022-11-22 RX ADMIN — AMLODIPINE BESYLATE 5 MILLIGRAM(S): 2.5 TABLET ORAL at 05:49

## 2022-11-22 RX ADMIN — BUDESONIDE AND FORMOTEROL FUMARATE DIHYDRATE 2 PUFF(S): 160; 4.5 AEROSOL RESPIRATORY (INHALATION) at 09:20

## 2022-11-22 RX ADMIN — Medication 1200 MILLIGRAM(S): at 05:50

## 2022-11-22 RX ADMIN — Medication 40 MILLIGRAM(S): at 05:48

## 2022-11-22 RX ADMIN — Medication 50 MILLIGRAM(S): at 05:50

## 2022-11-22 RX ADMIN — Medication 50 MILLIGRAM(S): at 13:36

## 2022-11-22 RX ADMIN — ISOSORBIDE MONONITRATE 30 MILLIGRAM(S): 60 TABLET, EXTENDED RELEASE ORAL at 11:27

## 2022-11-22 RX ADMIN — CEFTRIAXONE 100 MILLIGRAM(S): 500 INJECTION, POWDER, FOR SOLUTION INTRAMUSCULAR; INTRAVENOUS at 10:18

## 2022-11-22 RX ADMIN — AMIODARONE HYDROCHLORIDE 200 MILLIGRAM(S): 400 TABLET ORAL at 05:48

## 2022-11-22 NOTE — DISCHARGE NOTE PROVIDER - PROVIDER TOKENS
PROVIDER:[TOKEN:[[id number]],FOLLOWUP:[1 week]],FREE:[LAST:[Vello],FIRST:[Indu],PHONE:[(   )    -],FAX:[(   )    -],ADDRESS:[Nephrology],FOLLOWUP:[1 week]] PROVIDER:[TOKEN:[[id number]],FOLLOWUP:[1 week]],FREE:[LAST:[Vello],FIRST:[Indu],PHONE:[(   )    -],FAX:[(   )    -],ADDRESS:[Nephrology],FOLLOWUP:[1 week]],FREE:[LAST:[Dov],FIRST:[Memo],PHONE:[phone]],FAX:[(   )    -],ADDRESS:[PMD],FOLLOWUP:[1 week]] PROVIDER:[TOKEN:[[id number]],FOLLOWUP:[1 week]],FREE:[LAST:[Vello],FIRST:[Indu],PHONE:[(   )    -],FAX:[(   )    -],ADDRESS:[Nephrology],FOLLOWUP:[1 week]],FREE:[LAST:[Dov],FIRST:[Memo],PHONE:[phone]],FAX:[(   )    -],ADDRESS:[PMD],FOLLOWUP:[1 week]],PROVIDER:[TOKEN:[[id number]],FOLLOWUP:[Routine]]

## 2022-11-22 NOTE — DISCHARGE NOTE PROVIDER - HOSPITAL COURSE
87M with PMHx HTN, HLD, DM2, CKD ,COPD KENRICK CPAP at home, Afib, CAD (s/p stent 7/22), Right nephrectomy, p/w cough and LOPEZ and sent by PCP for suspicion of PE. PE less likely. Admit for COPD exacerbation, placed BIPAP at night. pulmonary  consulted.  started inhalers, IV steroid.   CT chest shows LLL 6mm nodular opacity and ground glass opacity.  PT evaluated no skill needed.   Patient tolerates well on room air.   Nephrology consulted for GREGORY on CKD. hx Right nephrectomy. UA mildly positive, asymptomatic dysuria. urine osmol and electrolytes unremarkableukocytosis likely due to steroid use. Pt remains afebrile.        Problem/Plan - 1:  ·  Problem: KENRICK and COPD overlap syndrome.   ·  Plan: pt with LOPEZ poor exercise tolerance. was sent for suspicion of PE   ddimer wnl, pt not hypoxic or tachycardic, pt on xarelto, no DVT on LE doppler  Wells Score: 0  CTA deferred for GREGORY and hx of solitary kidney  CT shows LLL 6mm lung nodule and ground glass opacities, repeat CT   CXR shows cardiomegaly and pulmonary edema (wet-read)  leg swelling on exam  Echo 7/22 shows EF 50-55%, moderate LVH, Diastolic function not assessed  Pro-BNP wnl for age  on ICS at home as well CPAP  pt saturating well, no wheezing though increased sputum  no suspicion of COPD exacerbation at this time  c/w nightly CPAP, Albuterol PRN, symbicort  Dr. Monique consulted.     Problem/Plan - 2:  ·  Problem: Acute kidney injury superimposed on CKD.   ·  Plan: pt with solitary kidney  Cr 1.88 on admission, now worsening 2.14  CTA deferred as above   f/u urine lytes  hold fluids for suspicion of chf exacerbation  monitor BMP  nephrology dr. Cole consulted.     Problem/Plan - 3:  ·  Problem: Pulmonary hypertension.   ·  Plan: Echo noted  c/w bronchodilators  monitor oxygen sat levels  oxygen supp prn  [facility].  cardiology dr. Almeida  pulm dr. Monique.     Problem/Plan - 4:  ·  Problem: Atrial fibrillation.   ·  Plan: EKG shows rate controlled AFib   on metoprolol, amiodarone and Xarelto at home   c/w home meds.     Problem/Plan - 5:  ·  Problem: DM2 (diabetes mellitus, type 2).   ·  Plan: on Farxiga and Januvia  A1c 6.4 7/22  c/w SSI   FS ACHS.     Problem/Plan - 6:  ·  Problem: HTN (hypertension).   ·  Plan: on amlodipine, metoprolol and hydralazine at home  c/w home meds (w/ parameters).     Problem/Plan - 7:  ·  Problem: HLD (hyperlipidemia).   ·  Plan: c/w statin.     Problem/Plan - 8:  ·  Problem: BPH (benign prostatic hyperplasia).   ·  Plan: c/w tamsulosin 87M with PMHx HTN, HLD, DM2, CKD ,COPD KENRICK CPAP at home, Afib, CAD (s/p stent 7/22), Right nephrectomy, p/w cough and LOPEZ and sent by PCP for suspicion of PE. PE less likely. Admit for COPD exacerbation, placed BIPAP at night. pulmonary  consulted.  started inhalers, IV steroid.   CT chest shows LLL 6mm nodular opacity and ground glass opacity.  PT evaluated no skill needed.   Patient tolerates well on room air at rest and on ambulation.   Nephrology consulted for GREGORY on CKD. hx Right nephrectomy. UA mildly positive, asymptomatic dysuria. urine osmol and electrolytes unremarkable. slightly elevated serum osmol likely dehydrated.  Encouraged po fluid and s/p IV fluid. Scr improving. Leukocytosis likely due to steroid use. Pt remains afebrile.   Patient is clinically improving and decision is made for discharge home with outpatient follow up in one week with dr. Monique. Pt is advised to complete po steroid with taper.   Please refer medical records/consult/progress notes for in depth hospital course as this is a brief summary.       < from: CT Chest No Cont (11.20.22 @ 10:18) >  IMPRESSION:  A 6 mm left lower lobe nodular opacity with adjacent ground-glass opacities may be infectious or inflammatory, however, Fleischner society   recommendations for incidental pulmonary nodule follow-up:  >4-6mm:  Non-smoker: 12 month follow-up chest CT recommended and if unchanged, no further follow-up necessary.  Smoker: Initial follow-up chest CT at 6-12 months then at 18-24 months if no change.  < end of copied text >

## 2022-11-22 NOTE — PROGRESS NOTE ADULT - PROVIDER SPECIALTY LIST ADULT
Cardiology
Internal Medicine
Cardiology
Cardiology
Internal Medicine
Pulmonology

## 2022-11-22 NOTE — PROGRESS NOTE ADULT - PROBLEM SELECTOR PLAN 2
pt with solitary kidney  Cr 1.88 on admission, now worsening 2.14  CTA deferred as above   f/u urine lytes  hold fluids for suspicion of chf exacerbation  monitor BMP  nephrology dr. Cole consulted
Accuchecks with reg insulin coverage  HBA1C.

## 2022-11-22 NOTE — PROGRESS NOTE ADULT - PROBLEM SELECTOR PROBLEM 3
Pulmonary hypertension
BPH (benign prostatic hyperplasia)
Pulmonary hypertension
Pulmonary hypertension

## 2022-11-22 NOTE — PROGRESS NOTE ADULT - SUBJECTIVE AND OBJECTIVE BOX
Patient is a 87y old  Male who presents with a chief complaint of CHF exacerbation (21 Nov 2022 18:06)    pt seen in tele [  ], reg med floor [  x ], bed [ x ], chair at bedside [   ], a+o x3 [x  ], lethargic [  ],    nad [ x ]        Allergies    No Known Allergies        Vitals    T(F): 98.2 (11-22-22 @ 05:18), Max: 98.2 (11-22-22 @ 05:18)  HR: 97 (11-22-22 @ 05:18) (67 - 97)  BP: 128/71 (11-22-22 @ 05:18) (128/71 - 146/70)  RR: 18 (11-21-22 @ 20:02) (17 - 18)  SpO2: 98% (11-22-22 @ 05:18) (93% - 99%)  Wt(kg): --  CAPILLARY BLOOD GLUCOSE      POCT Blood Glucose.: 248 mg/dL (21 Nov 2022 20:59)      Labs                          15.3   17.94 )-----------( 235      ( 21 Nov 2022 07:05 )             46.4       11-21    140  |  107  |  38<H>  ----------------------------<  250<H>  4.3   |  25  |  2.14<H>    Ca    9.1      21 Nov 2022 07:05                  Radiology Results      Meds    MEDICATIONS  (STANDING):  aMIOdarone    Tablet 200 milliGRAM(s) Oral daily  amLODIPine   Tablet 5 milliGRAM(s) Oral daily  atorvastatin 10 milliGRAM(s) Oral at bedtime  budesonide  80 MICROgram(s)/formoterol 4.5 MICROgram(s) Inhaler 2 Puff(s) Inhalation two times a day  clopidogrel Tablet 75 milliGRAM(s) Oral daily  guaiFENesin ER 1200 milliGRAM(s) Oral every 12 hours  hydrALAZINE 50 milliGRAM(s) Oral three times a day  insulin lispro (ADMELOG) corrective regimen sliding scale   SubCutaneous three times a day before meals  insulin lispro (ADMELOG) corrective regimen sliding scale   SubCutaneous at bedtime  isosorbide   mononitrate ER Tablet (IMDUR) 30 milliGRAM(s) Oral daily  methylPREDNISolone sodium succinate Injectable 40 milliGRAM(s) IV Push every 8 hours  metoprolol tartrate 12.5 milliGRAM(s) Oral two times a day  rivaroxaban 15 milliGRAM(s) Oral with dinner  tamsulosin 0.4 milliGRAM(s) Oral at bedtime      MEDICATIONS  (PRN):  acetaminophen     Tablet .. 650 milliGRAM(s) Oral every 6 hours PRN Temp greater or equal to 38C (100.4F), Mild Pain (1 - 3)  albuterol    90 MICROgram(s) HFA Inhaler 2 Puff(s) Inhalation every 6 hours PRN Shortness of Breath and/or Wheezing  melatonin 3 milliGRAM(s) Oral at bedtime PRN Insomnia  ondansetron Injectable 4 milliGRAM(s) IV Push every 8 hours PRN Nausea and/or Vomiting      Physical Exam    Neuro :  no focal deficits  Respiratory: posterior wheeze B/L  CV: RRR, S1S2, no murmurs,   Abdominal: Soft, NT, ND +BS,  Extremities: No edema, + peripheral pulses      ASSESSMENT    harris,   uncontrolled htn,  r/o acs,   r/o chf,   pe r/o,   poss copd exacerb,   h/o HTN,   CAD (s/p stent 7/22)   KENRICK and COPD overlap syndrome  DM2 (diabetes mellitus, type 2)  HLD (hyperlipidemia)  CKD (chronic kidney disease)  Chronic atrial fibrillation  BPH without urinary obstruction  Renal cell cancer s/p right nephrectomy  S/P cholecystectomy        PLAN    cont tele,   acs protocol,   plavix, statin,   trop x2 neg noted above  cardio f/u   lopressor,   cont imdur 30mg qd,   cont hydralazine 50mg q8  f/u echo   cont solumedrol,   albuterol q6 prn,   pulm f/u   ct chest with A 6 mm left lower lobe nodular opacity with adjacent groundglass opacities may be infectious or inflammatory, however, Fleischner society   recommendations for incidental pulmonary nodule follow-up: >4-6mm: Non-smoker: 12 month follow-up chest CT recommended and if unchanged, no   further follow-up necessary. Smoker: Initial follow-up chest CT at 6-12 months then at 18-24 months if no change noted above.  cont supplemental O2 prn,   C-pap machine at night  Weight reduction.  d dimer neg noted    lispro ss,   hgba1c 6.3 noted    phys tx eval noted and rec no skilled phys tx needs  cont current meds          Patient is a 87y old  Male who presents with a chief complaint of CHF exacerbation (21 Nov 2022 18:06)    pt seen in tele [  ], reg med floor [  x ], bed [ x ], chair at bedside [   ], a+o x3 [x  ], lethargic [  ],    nad [ x ]        Allergies    No Known Allergies        Vitals    T(F): 98.2 (11-22-22 @ 05:18), Max: 98.2 (11-22-22 @ 05:18)  HR: 97 (11-22-22 @ 05:18) (67 - 97)  BP: 128/71 (11-22-22 @ 05:18) (128/71 - 146/70)  RR: 18 (11-21-22 @ 20:02) (17 - 18)  SpO2: 98% (11-22-22 @ 05:18) (93% - 99%)  Wt(kg): --  CAPILLARY BLOOD GLUCOSE      POCT Blood Glucose.: 248 mg/dL (21 Nov 2022 20:59)      Labs                          15.3   17.94 )-----------( 235      ( 21 Nov 2022 07:05 )             46.4       11-21    140  |  107  |  38<H>  ----------------------------<  250<H>  4.3   |  25  |  2.14<H>    Ca    9.1      21 Nov 2022 07:05      < from: Transthoracic Echocardiogram (11.20.22 @ 06:40) >  CONCLUSIONS:  1. Mild mitral regurgitation.  2. Aortic valve leaflet morphology not well visualized.  No  elevated gradients were noted across the valve. Mild to  moderate aortic regurgitation.  3. Mildly dilated left atrium.  LA volume index = 35 cc/m2.  4. Mild left ventricular enlargement.  5. Endocardium not well visualized; grossly normal left  ventricular systolic function based on limited views.  Segmental wall motion could not be assessed.  6. Normal right ventricular size and function.  7. RV systolic pressure is 46 mm Hg. Mild pulmonary  hypertension.  Ejection Fraction Visual Estimate: >55 %    < end of copied text >              Radiology Results      Meds    MEDICATIONS  (STANDING):  aMIOdarone    Tablet 200 milliGRAM(s) Oral daily  amLODIPine   Tablet 5 milliGRAM(s) Oral daily  atorvastatin 10 milliGRAM(s) Oral at bedtime  budesonide  80 MICROgram(s)/formoterol 4.5 MICROgram(s) Inhaler 2 Puff(s) Inhalation two times a day  clopidogrel Tablet 75 milliGRAM(s) Oral daily  guaiFENesin ER 1200 milliGRAM(s) Oral every 12 hours  hydrALAZINE 50 milliGRAM(s) Oral three times a day  insulin lispro (ADMELOG) corrective regimen sliding scale   SubCutaneous three times a day before meals  insulin lispro (ADMELOG) corrective regimen sliding scale   SubCutaneous at bedtime  isosorbide   mononitrate ER Tablet (IMDUR) 30 milliGRAM(s) Oral daily  methylPREDNISolone sodium succinate Injectable 40 milliGRAM(s) IV Push every 8 hours  metoprolol tartrate 12.5 milliGRAM(s) Oral two times a day  rivaroxaban 15 milliGRAM(s) Oral with dinner  tamsulosin 0.4 milliGRAM(s) Oral at bedtime      MEDICATIONS  (PRN):  acetaminophen     Tablet .. 650 milliGRAM(s) Oral every 6 hours PRN Temp greater or equal to 38C (100.4F), Mild Pain (1 - 3)  albuterol    90 MICROgram(s) HFA Inhaler 2 Puff(s) Inhalation every 6 hours PRN Shortness of Breath and/or Wheezing  melatonin 3 milliGRAM(s) Oral at bedtime PRN Insomnia  ondansetron Injectable 4 milliGRAM(s) IV Push every 8 hours PRN Nausea and/or Vomiting      Physical Exam    Neuro :  no focal deficits  Respiratory: cta B/L  CV: RRR, S1S2, no murmurs,   Abdominal: Soft, NT, ND +BS,  Extremities: No edema, + peripheral pulses      ASSESSMENT    harris,   uncontrolled htn,  acs r/o,   pe r/o,   poss copd exacerb,   h/o HTN,   CAD (s/p stent 7/22)   KENRICK and COPD overlap syndrome  DM2 (diabetes mellitus, type 2)  HLD (hyperlipidemia)  CKD (chronic kidney disease)  Chronic atrial fibrillation  BPH without urinary obstruction  Renal cell cancer s/p right nephrectomy  S/P cholecystectomy        PLAN    cont tele,   acs protocol,   plavix, statin,   trop x2 neg noted above  cardio f/u   lopressor,   cont imdur 30mg qd,   cont hydralazine 50mg q8  echo with Ejection Fraction Visual Estimate: >55 %, Mild pulmonary hypertension noted above.  change solumedrol to prednisone and taper over 5 days,   albuterol q6 prn,   pulm f/u   ct chest with A 6 mm left lower lobe nodular opacity with adjacent groundglass opacities may be infectious or inflammatory, however, Fleischner society   recommendations for incidental pulmonary nodule follow-up: >4-6mm: Non-smoker: 12 month follow-up chest CT recommended and if unchanged, no   further follow-up necessary. Smoker: Initial follow-up chest CT at 6-12 months then at 18-24 months if no change noted    Symbicort 160/4.5 mcg, 2 puffs PO BID with spacer  Spiriva 1 inhalation once a day  C-pap machine at night  Weight reduction.  d dimer neg noted    renal f/u  pt is s/p right nephrectomy  GREGORY possible dehydration due to reduce po intake in the hospital and effect of Fraxiga.   lispro ss,   hgba1c 6.3 noted    phys tx eval noted and rec no skilled phys tx needs  cont current meds   pt stable for d/c

## 2022-11-22 NOTE — PROGRESS NOTE ADULT - SUBJECTIVE AND OBJECTIVE BOX
Patient is a 87y old  Male who presents with a chief complaint of CHF exacerbation (2022 10:03)    Patient is awake, alert, comfortable, sitting in bed, not in acute distress, doing well on RA. Still complains of cough.     INTERVAL HPI/OVERNIGHT EVENTS:      VITAL SIGNS:  T(F): 98.2 (22 @ 05:18)  HR: 97 (22 @ 05:18)  BP: 128/71 (22 @ 05:18)  RR: 18 (22 @ 20:02)  SpO2: 98% (22 @ 05:18)  Wt(kg): --  I&O's Detail          REVIEW OF SYSTEMS:    CONSTITUTIONAL:  No fevers, chills, sweats    HEENT:  Eyes:  No diplopia or blurred vision. ENT:  No earache, sore throat or runny nose.    CARDIOVASCULAR:  No pressure, squeezing, tightness, or heaviness about the chest; no palpitations.    RESPIRATORY:  Per HPI    GASTROINTESTINAL:  No abdominal pain, nausea, vomiting or diarrhea.    GENITOURINARY:  No dysuria, frequency or urgency.    NEUROLOGIC:  No paresthesias, fasciculations, seizures or weakness.    PSYCHIATRIC:  No disorder of thought or mood.      PHYSICAL EXAM:    Constitutional: Well developed and nourished  Eyes:Perrla  ENMT: normal  Neck:supple  Respiratory: good air entry  Cardiovascular: S1 S2 regular  Gastrointestinal: Soft, Non tender  Extremities: No edema  Vascular:normal  Neurological:Awake, alert,Ox3  Musculoskeletal:Normal      MEDICATIONS  (STANDING):  aMIOdarone    Tablet 200 milliGRAM(s) Oral daily  amLODIPine   Tablet 5 milliGRAM(s) Oral daily  atorvastatin 10 milliGRAM(s) Oral at bedtime  budesonide  80 MICROgram(s)/formoterol 4.5 MICROgram(s) Inhaler 2 Puff(s) Inhalation two times a day  clopidogrel Tablet 75 milliGRAM(s) Oral daily  guaiFENesin ER 1200 milliGRAM(s) Oral every 12 hours  hydrALAZINE 50 milliGRAM(s) Oral three times a day  insulin lispro (ADMELOG) corrective regimen sliding scale   SubCutaneous three times a day before meals  insulin lispro (ADMELOG) corrective regimen sliding scale   SubCutaneous at bedtime  isosorbide   mononitrate ER Tablet (IMDUR) 30 milliGRAM(s) Oral daily  methylPREDNISolone sodium succinate Injectable 40 milliGRAM(s) IV Push every 8 hours  metoprolol tartrate 12.5 milliGRAM(s) Oral two times a day  rivaroxaban 15 milliGRAM(s) Oral with dinner  tamsulosin 0.4 milliGRAM(s) Oral at bedtime  tiotropium 18 MICROgram(s) Capsule 1 Capsule(s) Inhalation daily    MEDICATIONS  (PRN):  acetaminophen     Tablet .. 650 milliGRAM(s) Oral every 6 hours PRN Temp greater or equal to 38C (100.4F), Mild Pain (1 - 3)  albuterol    90 MICROgram(s) HFA Inhaler 2 Puff(s) Inhalation every 6 hours PRN Shortness of Breath and/or Wheezing  melatonin 3 milliGRAM(s) Oral at bedtime PRN Insomnia  ondansetron Injectable 4 milliGRAM(s) IV Push every 8 hours PRN Nausea and/or Vomiting      Allergies    No Known Allergies    Intolerances        LABS:                        16.4   17.21 )-----------( 249      ( 2022 07:45 )             50.2     11    138  |  104  |  40<H>  ----------------------------<  217<H>  4.6   |  25  |  1.96<H>    Ca    9.1      2022 07:45    TPro  7.1  /  Alb  3.2<L>  /  TBili  0.7  /  DBili  x   /  AST  19  /  ALT  31  /  AlkPhos  62  22      Urinalysis Basic - ( 2022 18:43 )    Color: Yellow / Appearance: Clear / S.020 / pH: x  Gluc: x / Ketone: Negative  / Bili: Negative / Urobili: Negative   Blood: x / Protein: 30 mg/dL / Nitrite: Negative   Leuk Esterase: Trace / RBC: 0-2 /HPF / WBC 6-10 /HPF   Sq Epi: x / Non Sq Epi: Few /HPF / Bacteria: Moderate /HPF            CAPILLARY BLOOD GLUCOSE      POCT Blood Glucose.: 192 mg/dL (2022 08:09)  POCT Blood Glucose.: 248 mg/dL (2022 20:59)  POCT Blood Glucose.: 190 mg/dL (2022 16:43)  POCT Blood Glucose.: 209 mg/dL (2022 11:38)    pro-bnp --  @ 21:07     d-dimer 181   @ 21:07  pro-bnp 1008  @ 13:45     d-dimer --   @ 13:45      RADIOLOGY & ADDITIONAL TESTS:    CXR:    Ct scan chest:  < from: CT Chest No Cont (22 @ 10:18) >  IMPRESSION:  A 6 mm left lower lobe nodular opacity with adjacent groundglass   opacities may be infectious or inflammatory, however, Fleischner society   recommendations for incidental pulmonary nodule follow-up:    >4-6mm:  Non-smoker: 12 month follow-up chest CT recommended and if unchanged, no   further follow-up necessary.  Smoker: Initial follow-up chest CT at 6-12 months then at 18-24 months if   no change.    --- End of Report ---    < end of copied text >      ekg;    echo:  < from: Transthoracic Echocardiogram (22 @ 06:40) >  CONCLUSIONS:  1. Mild mitral regurgitation.  2. Aortic valve leaflet morphology not well visualized.  No  elevated gradients were noted across the valve. Mild to  moderate aortic regurgitation.  3. Mildly dilated left atrium.  LA volume index = 35 cc/m2.  4. Mild left ventricular enlargement.  5. Endocardium not well visualized; grossly normal left  ventricular systolic function based on limited views.  Segmental wall motion could not be assessed.  6. Normal right ventricular size and function.  7. RV systolic pressure is 46 mm Hg. Mild pulmonary  hypertension.    < end of copied text >   Patient is a 87y old  Male who presents with a chief complaint of CHF exacerbation (2022 10:03)    Patient is awake, alert, comfortable, sitting in bed, not in acute distress, doing well on RA. Still complains of cough and chest congestion    INTERVAL HPI/OVERNIGHT EVENTS:      VITAL SIGNS:  T(F): 98.2 (22 @ 05:18)  HR: 97 (22 @ 05:18)  BP: 128/71 (22 @ 05:18)  RR: 18 (22 @ 20:02)  SpO2: 98% (22 @ 05:18)  Wt(kg): --  I&O's Detail          REVIEW OF SYSTEMS:    CONSTITUTIONAL:  No fevers, chills, sweats    HEENT:  Eyes:  No diplopia or blurred vision. ENT:  No earache, sore throat or runny nose.    CARDIOVASCULAR:  No pressure, squeezing, tightness, or heaviness about the chest; no palpitations.    RESPIRATORY:  Per HPI    GASTROINTESTINAL:  No abdominal pain, nausea, vomiting or diarrhea.    GENITOURINARY:  No dysuria, frequency or urgency.    NEUROLOGIC:  No paresthesias, fasciculations, seizures or weakness.    PSYCHIATRIC:  No disorder of thought or mood.      PHYSICAL EXAM:    Constitutional: Well developed and nourished  Eyes:Perrla  ENMT: normal  Neck:supple  Respiratory: good air entry  Cardiovascular: S1 S2 regular  Gastrointestinal: Soft, Non tender  Extremities: No edema  Vascular:normal  Neurological:Awake, alert,Ox3  Musculoskeletal:Normal      MEDICATIONS  (STANDING):  aMIOdarone    Tablet 200 milliGRAM(s) Oral daily  amLODIPine   Tablet 5 milliGRAM(s) Oral daily  atorvastatin 10 milliGRAM(s) Oral at bedtime  budesonide  80 MICROgram(s)/formoterol 4.5 MICROgram(s) Inhaler 2 Puff(s) Inhalation two times a day  clopidogrel Tablet 75 milliGRAM(s) Oral daily  guaiFENesin ER 1200 milliGRAM(s) Oral every 12 hours  hydrALAZINE 50 milliGRAM(s) Oral three times a day  insulin lispro (ADMELOG) corrective regimen sliding scale   SubCutaneous three times a day before meals  insulin lispro (ADMELOG) corrective regimen sliding scale   SubCutaneous at bedtime  isosorbide   mononitrate ER Tablet (IMDUR) 30 milliGRAM(s) Oral daily  methylPREDNISolone sodium succinate Injectable 40 milliGRAM(s) IV Push every 8 hours  metoprolol tartrate 12.5 milliGRAM(s) Oral two times a day  rivaroxaban 15 milliGRAM(s) Oral with dinner  tamsulosin 0.4 milliGRAM(s) Oral at bedtime  tiotropium 18 MICROgram(s) Capsule 1 Capsule(s) Inhalation daily    MEDICATIONS  (PRN):  acetaminophen     Tablet .. 650 milliGRAM(s) Oral every 6 hours PRN Temp greater or equal to 38C (100.4F), Mild Pain (1 - 3)  albuterol    90 MICROgram(s) HFA Inhaler 2 Puff(s) Inhalation every 6 hours PRN Shortness of Breath and/or Wheezing  melatonin 3 milliGRAM(s) Oral at bedtime PRN Insomnia  ondansetron Injectable 4 milliGRAM(s) IV Push every 8 hours PRN Nausea and/or Vomiting      Allergies    No Known Allergies    Intolerances        LABS:                        16.4   17.21 )-----------( 249      ( 2022 07:45 )             50.2     11    138  |  104  |  40<H>  ----------------------------<  217<H>  4.6   |  25  |  1.96<H>    Ca    9.1      2022 07:45    TPro  7.1  /  Alb  3.2<L>  /  TBili  0.7  /  DBili  x   /  AST  19  /  ALT  31  /  AlkPhos  62  11-22      Urinalysis Basic - ( 2022 18:43 )    Color: Yellow / Appearance: Clear / S.020 / pH: x  Gluc: x / Ketone: Negative  / Bili: Negative / Urobili: Negative   Blood: x / Protein: 30 mg/dL / Nitrite: Negative   Leuk Esterase: Trace / RBC: 0-2 /HPF / WBC 6-10 /HPF   Sq Epi: x / Non Sq Epi: Few /HPF / Bacteria: Moderate /HPF            CAPILLARY BLOOD GLUCOSE      POCT Blood Glucose.: 192 mg/dL (2022 08:09)  POCT Blood Glucose.: 248 mg/dL (2022 20:59)  POCT Blood Glucose.: 190 mg/dL (2022 16:43)  POCT Blood Glucose.: 209 mg/dL (2022 11:38)    pro-bnp --  @ 21:07     d-dimer 181   @ 21:07  pro-bnp 1008  @ 13:45     d-dimer --   @ 13:45      RADIOLOGY & ADDITIONAL TESTS:    CXR:    Ct scan chest:  < from: CT Chest No Cont (22 @ 10:18) >  IMPRESSION:  A 6 mm left lower lobe nodular opacity with adjacent groundglass   opacities may be infectious or inflammatory, however, Fleischner society   recommendations for incidental pulmonary nodule follow-up:    >4-6mm:  Non-smoker: 12 month follow-up chest CT recommended and if unchanged, no   further follow-up necessary.  Smoker: Initial follow-up chest CT at 6-12 months then at 18-24 months if   no change.    --- End of Report ---    < end of copied text >      ekg;    echo:  < from: Transthoracic Echocardiogram (22 @ 06:40) >  CONCLUSIONS:  1. Mild mitral regurgitation.  2. Aortic valve leaflet morphology not well visualized.  No  elevated gradients were noted across the valve. Mild to  moderate aortic regurgitation.  3. Mildly dilated left atrium.  LA volume index = 35 cc/m2.  4. Mild left ventricular enlargement.  5. Endocardium not well visualized; grossly normal left  ventricular systolic function based on limited views.  Segmental wall motion could not be assessed.  6. Normal right ventricular size and function.  7. RV systolic pressure is 46 mm Hg. Mild pulmonary  hypertension.    < end of copied text >

## 2022-11-22 NOTE — DISCHARGE NOTE NURSING/CASE MANAGEMENT/SOCIAL WORK - PATIENT PORTAL LINK FT
You can access the FollowMyHealth Patient Portal offered by [facility] by registering at the following website: [email or web address]. By joining [facility]’s FollowMyHealth portal, you will also be able to view your health information using other applications (apps) compatible with our system.

## 2022-11-22 NOTE — PROGRESS NOTE ADULT - PROBLEM SELECTOR PROBLEM 4
HTN (hypertension)
BPH (benign prostatic hyperplasia)
Atrial fibrillation
BPH (benign prostatic hyperplasia)

## 2022-11-22 NOTE — DISCHARGE NOTE PROVIDER - NPI NUMBER (FOR SYS[employer] USE ONLY) :
[[id number]],[UNKNOWN] [[id number]],[UNKNOWN],[UNKNOWN] [[id number]],[UNKNOWN],[UNKNOWN],[[id number]]

## 2022-11-22 NOTE — PROGRESS NOTE ADULT - PROBLEM SELECTOR PLAN 5
cont meds  Cardio follow up
monitor BP  cont meds.
on Farxiga and Januvia  A1c 6.4 7/22  c/w SSI   FS ACHS
monitor BP  cont meds.

## 2022-11-22 NOTE — DISCHARGE NOTE PROVIDER - NSDCCPCAREPLAN_GEN_ALL_CORE_FT
PRINCIPAL DISCHARGE DIAGNOSIS  Diagnosis: KENRICK and COPD overlap syndrome  Assessment and Plan of Treatment: You were admitted for shortness of breath on exertion, in the setting with Obstructive lung disease KENRICK/COPD. You use CPAP at home.   You were not hypoxic. D dimer remains normal. No blood clots on lower legs on ultrasound.   chest CT resulted left lower lobe 6mm lung nodular opacity and ground glass opacities may be infectious or inflammatory.  Negative pulmonary embolism.  chest xray resulted cardiomegaly (enlarged heart) and pulmonary edema.   Last echocardiogram resulted normal EF, moderate left ventricular hypertrophy 7/2022.   Started IV antibiotic for inflammation.  ceftin 5 more days.   You were placed on BIPAP at night and well tolerated.   You were followed by pulmonary specialist and started IV steroid and inhalers.   No suspicion of COPD exacerbation this time. You don't require oxygen therapy during the day time.   continue CPAP at night at home.   Continue oral steroid prednisone with tapered dose, 40mg daily x 3 days then 30mg x 3 days then 20mg x 3 days then 10mg x 3 days then stop.   Follow up with pulmonary dr. Monique in one week after discharge. repeat CT chest 6- 12 months as recommended.      SECONDARY DISCHARGE DIAGNOSES  Diagnosis: Acute kidney injury superimposed on CKD  Assessment and Plan of Treatment: You have one kidney, has history of right nephrectomy  You were followed by nephrology for deteriorated kidney functions likely due to dehydration.   received fluid intravenously. now kidney functions are improving.   Follow up with your kidney specialist dr. Dalton Griggs in one week after discharge.   Acute kidney injury (GREGORY) is when the kidneys suddenly stop working effectively.  This can happen when there is less blood flow to the kidneys, there is kidney damage or the path for urine to leave the body is blocked.  GREGORY can cause decreased urination, blood in the urine, swelling, vomiting, weakness, confusion and/or seisures.  The treatment depends on the cause and severity of the injury.  In any case, while your kidneys are healing you should avoid agents that can cause kidney injury.  Some of these agents include NSAIDs, Gadolinium contrast, and Phosphate containing enemas.         Diagnosis: Pulmonary hypertension  Assessment and Plan of Treatment: Last echocardiogram shows mild pulmonary hypertension. your were followed by cardiology and pulmonary doctor. Blood pressure controlled. continue home medication.   You were off oxygen then tolearted well on room air. continue CPAP at night.   continue inhlaers and steroid therapy. follow up with your primary MD.    Diagnosis: Atrial fibrillation  Assessment and Plan of Treatment: continue home meds- metoprolol, amiodarone and Xarelto   Folllow up with your PMD after discharge.   Atrial fibrillation is the most common heart rhythm problem.  The condition puts you at risk for has stroke and heart attack  It helps if you control your blood pressure, not drink more than 1-2 alcohol drinks per day, cut down on caffeine, getting treatment for over active thyroid gland, and get regular exercise  Call your doctor if you feel your heart racing or beating unusually, chest tightness or pain, lightheaded, faint, shortness of breath especially with exercise  It is important to take your heart medication as prescribed  You may be on anticoagulation which is very important to take as directed - you may need blood work to monitor drug levels      Diagnosis: HTN (hypertension)  Assessment and Plan of Treatment: Low salt diet  Activity as tolerated. You were followed by cardiology and started Imdur to control blood pressure. BP controlled well.   Take all medication as prescribed.  Follow up with your medical doctor for routine blood pressure monitoring at your next visit.  Notify your doctor if you have any of the following symptoms:   Dizziness, Lightheadedness, Blurry vision, Headache, Chest pain, Shortness of breath  Follow up with PMD in one week.       Diagnosis: CAD (coronary artery disease)  Assessment and Plan of Treatment: Take your cardiac medication as prescribed to lower cholesterol, to lower blood pressure, aspirin to prevent blood clots, and diabetes control  Make sure to keep appointments with prmiary doctor and /or cardiology dr. Almeida for  follow up care  Coronary artery disease is a condition where the arteries the supply the heart muscle get clogges with fatty deposits & puts you at risk for a heart attack  Call your doctor if you have any new pain, pressure, or discomfort in the center of your chest, pain, tingling or discomfort in arms, back, neck, jaw, or stomach, shortness of breath, nausea, vomiting, burping or heartburn, sweating, cold and clammy skin, racing or abnormal heartbeat for more than 10 minutes or if they keep coming & going.  Call 911 and do not tr to get to hospital by care  You can help yourself with lefestyle changes (quitting smoking if you smoke), eat lots of fruits & vegetables & low fat dairy products, not a lot of meat & fatty foods, walk or some form of physical activity most days of the week, lose weight if you are overweight      Diagnosis: DM2 (diabetes mellitus, type 2)  Assessment and Plan of Treatment: continue home meds- Farxiga and Januvia  A1c 6.4 7/22  Make sure you get your HgA1c checked every three months.  If you take oral diabetes medications, check your blood glucose two times a day.  It's important not to skip any meals.  Keep a log of your blood glucose results and always take it with you to your doctor appointments.  Keep a list of your current medications including injectables and over the counter medications and bring this medication list with you to all your doctor appointments.  If you have not seen your ophthalmologist this year call for appointment.  Check your feet daily for redness, sores, or openings. Do not self treat. If no improvement in two days call your primary care physician for an appointment.  Low blood sugar (hypoglycemia) is a blood sugar below 70mg/dl. Check your blood sugar if you feel signs/symptoms of hypoglycemia. If your blood sugar is below 70 take 15 grams of carbohydrates (ex 4 oz of apple juice, 3-4 glucose tablets, or 4-6 oz of regular soda) wait 15 minutes and repeat blood sugar to make sure it comes up above 70.  If your blood sugar is above 70 and you are due for a meal, have a meal.  If you are not due for a meal have a snack.  This snack helps keeps your blood sugar at a safe range.  Follow up with your PMD after discharg.e       Diagnosis: HLD (hyperlipidemia)  Assessment and Plan of Treatment: Follow up with PCP for treatment goals, continue medication, have liver function testing every 3 months as anti lipid medications can cause liver irritation, eat low fat, low cholesterol meals      Diagnosis: BPH (benign prostatic hyperplasia)  Assessment and Plan of Treatment: Take your medication as prescribed and follow up with your primary MD.   You have an enlarged prostate gland which gets bigger as men get older - it is a very common problem and has nothing to do with prostate cancer  Call your doctor if you are urinating more frequently, have trouble starting to urinate, have weak stream, urine leaking or dribbling, and feeling as though bladder is not empty after urination  Your doctor will monitor your prostate with a rectal exam as well as urine or blood testing  You can help yourself by reducing the amount of fluid you drink before going to bed, limiting the amount of alcohol & caffeine you drink   Avoid cold & allergy medication that contain decongestants or antihistamines which make BPH symptoms worse  You can also "double void" by waiting a moment after urinating & trying again

## 2022-11-22 NOTE — PROGRESS NOTE ADULT - PROBLEM SELECTOR PLAN 1
oxygen supp  Bronchodilators  Steroids  monitor oxygen sat  C-pap machine at night  Weight reduction.
oxygen supp  Bronchodilators  Steroids  Symbicort 160/4.5 mcg, 2 puffs PO BID with spacer  Spiriva 1 inhalation once a day  monitor oxygen sat  C-pap machine at night  Weight reduction.
oxygen supp  Bronchodilators  Steroids  Symbicort 160/4.5 mcg, 2 puffs PO BID with spacer  Spiriva 1 inhalation once a day  monitor oxygen sat  C-pap machine at night  Weight reduction.
pt with LOPEZ poor exercise tolerance. was sent for suspicion of PE   ddimer wnl, pt not hypoxic or tachycardic, pt on xarelto, no DVT on LE doppler  Wells Score: 0  CTA deferred for GREGORY and hx of solitary kidney  CT shows LLL 6mm lung nodule and ground glass opacities, repeat CT   CXR shows cardiomegaly and pulmonary edema (wet-read)  leg swelling on exam  Echo 7/22 shows EF 50-55%, moderate LVH, Diastolic function not assessed  Pro-BNP wnl for age  on ICS at home as well CPAP  pt saturating well, no wheezing though increased sputum  no suspicion of COPD exacerbation at this time  c/w nightly CPAP, Albuterol PRN, symbicort  Dr. Monique consulted

## 2022-11-22 NOTE — PROGRESS NOTE ADULT - PROBLEM SELECTOR PLAN 7
c/w statin
Avoid nephrotoxic drugs  monitor BMP  Renal eval.
lipid panel  statin.
Avoid nephrotoxic drugs  monitor BMP  Renal eval noted.

## 2022-11-22 NOTE — PROGRESS NOTE ADULT - ASSESSMENT
87-year-old male hx of HTN, HLD, DM2, CKD ,COPD, Parox Afib, CAD (s/p stent 7/22) presenting with cough and progressive dyspnea on exertion x 2 weeks due to COPD exacerbation.  1.COPD exacerbation-tx as per Pulmonary.  2.PAF-xarelto,amiodarone.  3.CAD-plavix,b blocker,statin.  4.HTN- imdur 30mg qd, hydralazine 50mg q8.  5.CRI-f/u lytes.  6.DM-Insulin.  7.Lipid d/o-statin.  8.PPI.
87M with PMHx HTN, HLD, DM2, CKD ,COPD KENRICK CPAP at home, Afib, CAD (s/p stent 7/22) p/w cough and LOPEZ and sent by PCP for suspicion of PE. PE less likely. Admit for COPD exacerbation, placed BIPAP at night. pulmonary  consulted.    CT chest shows LLL 6mm nodular opacity and ground glass opacity. started solumedrol. PT evaluated no skill needed.   tolerates well on room air.   GREGORY on CKD. worsening today. Nephrology consulted.

## 2022-11-22 NOTE — DISCHARGE NOTE NURSING/CASE MANAGEMENT/SOCIAL WORK - NSDCPEFALRISK_GEN_ALL_CORE
For information on Fall & Injury Prevention, visit: [email or web address] OR  [email or web address] OR  https://www.cdc.gov/steadi/patient.html

## 2022-11-22 NOTE — DISCHARGE NOTE PROVIDER - NSDCMRMEDTOKEN_GEN_ALL_CORE_FT
amiodarone 200 mg oral tablet: 1 tab(s) orally once a day  amLODIPine 5 mg oral tablet: 1 tab(s) orally once a day  atorvastatin 10 mg oral tablet: 1 tab(s) orally once a day (at bedtime)  Breo Ellipta 100 mcg-25 mcg/inh inhalation powder: 1 puff(s) inhaled once a day  clopidogrel 75 mg oral tablet: 1 tab(s) orally once a day  Farxiga 10 mg oral tablet: 1 tab(s) orally once a day  hydrALAZINE 50 mg oral tablet: 1 tab(s) orally 3 times a day  Januvia 50 mg oral tablet: 1 tab(s) orally once a day  meloxicam 7.5 mg oral tablet: 1 tab(s) orally once a day  Metoprolol Tartrate 25 mg oral tablet: 0.5 tab(s) orally 2 times a day   montelukast 10 mg oral tablet: 1 tab(s) orally once a day  Symbicort 80 mcg-4.5 mcg/inh inhalation aerosol: 2 puff(s) inhaled 2 times a day  tamsulosin 0.4 mg oral capsule: 1 cap(s) orally once a day  Vascepa 1 g oral capsule: 1  orally once a day  Xarelto 15 mg oral tablet: 1 tab(s) orally once a day (in the evening)   acetaminophen 325 mg oral tablet: 2 tab(s) orally every 6 hours, As needed, Temp greater or equal to 38C (100.4F), Mild Pain (1 - 3)  amiodarone 200 mg oral tablet: 1 tab(s) orally once a day  amLODIPine 5 mg oral tablet: 1 tab(s) orally once a day  atorvastatin 10 mg oral tablet: 1 tab(s) orally once a day (at bedtime)  Breo Ellipta 100 mcg-25 mcg/inh inhalation powder: 1 puff(s) inhaled once a day  cefuroxime 250 mg oral tablet: 1 tab(s) orally every 12 hours  clopidogrel 75 mg oral tablet: 1 tab(s) orally once a day  Farxiga 10 mg oral tablet: 1 tab(s) orally once a day  guaiFENesin 1200 mg oral tablet, extended release: 1 tab(s) orally every 12 hours  hydrALAZINE 50 mg oral tablet: 1 tab(s) orally 3 times a day  isosorbide mononitrate 30 mg oral tablet, extended release: 1 tab(s) orally once a day  Januvia 50 mg oral tablet: 1 tab(s) orally once a day  Metoprolol Tartrate 25 mg oral tablet: 0.5 tab(s) orally 2 times a day   montelukast 10 mg oral tablet: 1 tab(s) orally once a day  predniSONE 10 mg oral tablet: 4 tab(s) orally once a day x 3 days  3 tab(s) orally once a day x 3 days  2 tab(s) orally once a day x 3 days  1 tab(s) orally once a day x 3 days  Symbicort 80 mcg-4.5 mcg/inh inhalation aerosol: 2 puff(s) inhaled 2 times a day  tamsulosin 0.4 mg oral capsule: 1 cap(s) orally once a day  tiotropium 18 mcg inhalation capsule: 1 cap(s) inhaled once a day  Vascepa 1 g oral capsule: 1  orally once a day  Xarelto 15 mg oral tablet: 1 tab(s) orally once a day (in the evening)

## 2022-11-22 NOTE — PROGRESS NOTE ADULT - PROBLEM SELECTOR PLAN 4
check PSA   follow up as OP.
monitor BP  cont meds.
EKG shows rate controlled AFib   on metoprolol, amiodarone and Xarelto at home   c/w home meds
check PSA   follow up as OP.

## 2022-11-22 NOTE — PROGRESS NOTE ADULT - PROBLEM SELECTOR PROBLEM 7
HLD (hyperlipidemia)
Acute kidney injury superimposed on CKD
Acute kidney injury superimposed on CKD
HLD (hyperlipidemia)

## 2022-11-22 NOTE — PROGRESS NOTE ADULT - PROBLEM SELECTOR PROBLEM 2
Acute kidney injury superimposed on CKD
DM2 (diabetes mellitus, type 2)

## 2022-11-22 NOTE — PROGRESS NOTE ADULT - PROBLEM SELECTOR PLAN 3
Echo noted  c/w bronchodilators  monitor oxygen sat levels  oxygen supp prn  [facility].  cardiology dr. Almeida  pulm dr. Monique
echo noted  bronchodilators  monitor oxygen sat levels  oxygen supp prn  [facility].
check PSA   follow up as OP.
echo noted  bronchodilators  monitor oxygen sat levels  oxygen supp prn  [facility].

## 2022-11-22 NOTE — DISCHARGE NOTE PROVIDER - CARE PROVIDER_API CALL
Jey Monique)  Internal Medicine; Pulmonary Disease  [address] [place] [address]  Phone: [phone]  Fax: [phone]  Follow Up Time: 1 week    Indu Hoffman  Nephrology  Phone: (   )    -  Fax: (   )    -  Follow Up Time: 1 week   Jey Monique)  Internal Medicine; Pulmonary Disease  [address] [place] [address]  Phone: [phone]  Fax: [phone]  Follow Up Time: 1 week    Indu Hoffman  Nephrology  Phone: (   )    -  Fax: (   )    -  Follow Up Time: 1 week    Memo Monae  PMD  Phone: [phone]  Fax: (   )    -  Follow Up Time: 1 week   Jey Monique)  Internal Medicine; Pulmonary Disease  [address] [place] [address]  Phone: [phone]  Fax: [phone]  Follow Up Time: 1 week    Indu Hoffman  Nephrology  Phone: (   )    -  Fax: (   )    -  Follow Up Time: 1 week    Memo Monae  PMD  Phone: [phone]  Fax: (   )    -  Follow Up Time: 1 week    Eva Almeida)  Internal Medicine  [address] [place] [address]  Phone: [phone]  Fax: [phone]  Follow Up Time: Routine

## 2022-11-22 NOTE — PROGRESS NOTE ADULT - PROBLEM SELECTOR PROBLEM 1
KENRICK and COPD overlap syndrome

## 2022-11-22 NOTE — PROGRESS NOTE ADULT - SUBJECTIVE AND OBJECTIVE BOX
CHIEF COMPLAINT:Patient is a 87y old  Male who presents with a chief complaint of CHF exacerbation.Pt appears comfortable.    	  REVIEW OF SYSTEMS:  CONSTITUTIONAL: No fever, weight loss, or fatigue  EYES: No eye pain, visual disturbances, or discharge  ENT:  No difficulty hearing, tinnitus, vertigo; No sinus or throat pain  NECK: No pain or stiffness  RESPIRATORY: No cough, wheezing, chills or hemoptysis; No Shortness of Breath  CARDIOVASCULAR: No chest pain, palpitations, passing out, dizziness, or leg swelling  GASTROINTESTINAL: No abdominal or epigastric pain. No nausea, vomiting, or hematemesis; No diarrhea or constipation. No melena or hematochezia.  GENITOURINARY: No dysuria, frequency, hematuria, or incontinence  NEUROLOGICAL: No headaches, memory loss, loss of strength, numbness, or tremors  SKIN: No itching, burning, rashes, or lesions   LYMPH Nodes: No enlarged glands  ENDOCRINE: No heat or cold intolerance; No hair loss  MUSCULOSKELETAL: No joint pain or swelling; No muscle, back, or extremity pain  PSYCHIATRIC: No depression, anxiety, mood swings, or difficulty sleeping  HEME/LYMPH: No easy bruising, or bleeding gums  ALLERGY AND IMMUNOLOGIC: No hives or eczema	        PHYSICAL EXAM:  T(C): 36.8 (11-22-22 @ 05:18), Max: 36.8 (11-22-22 @ 05:18)  HR: 97 (11-22-22 @ 05:18) (67 - 97)  BP: 128/71 (11-22-22 @ 05:18) (128/71 - 146/70)  RR: 18 (11-21-22 @ 20:02) (17 - 18)  SpO2: 98% (11-22-22 @ 05:18) (93% - 99%)  Wt(kg): --  I&O's Summary      Appearance: Normal	  HEENT:   Normal oral mucosa, PERRL, EOMI	  Lymphatic: No lymphadenopathy  Cardiovascular: Normal S1 S2, No JVD, No murmurs, No edema  Respiratory: B/L ronchi  Psychiatry: A & O x 3, Mood & affect appropriate  Gastrointestinal:  Soft, Non-tender, + BS	  Skin: No rashes, No ecchymoses, No cyanosis	  Neurologic: Non-focal  Extremities: Normal range of motion, No clubbing, cyanosis or edema  Vascular: Peripheral pulses palpable 2+ bilaterally    MEDICATIONS  (STANDING):  aMIOdarone    Tablet 200 milliGRAM(s) Oral daily  amLODIPine   Tablet 5 milliGRAM(s) Oral daily  atorvastatin 10 milliGRAM(s) Oral at bedtime  budesonide  80 MICROgram(s)/formoterol 4.5 MICROgram(s) Inhaler 2 Puff(s) Inhalation two times a day  clopidogrel Tablet 75 milliGRAM(s) Oral daily  guaiFENesin ER 1200 milliGRAM(s) Oral every 12 hours  hydrALAZINE 50 milliGRAM(s) Oral three times a day  insulin lispro (ADMELOG) corrective regimen sliding scale   SubCutaneous three times a day before meals  insulin lispro (ADMELOG) corrective regimen sliding scale   SubCutaneous at bedtime  isosorbide   mononitrate ER Tablet (IMDUR) 30 milliGRAM(s) Oral daily  methylPREDNISolone sodium succinate Injectable 40 milliGRAM(s) IV Push every 12 hours  metoprolol tartrate 12.5 milliGRAM(s) Oral two times a day  rivaroxaban 15 milliGRAM(s) Oral with dinner  tamsulosin 0.4 milliGRAM(s) Oral at bedtime  tiotropium 18 MICROgram(s) Capsule 1 Capsule(s) Inhalation daily    	  	  LABS:	 	                            16.4   17.21 )-----------( 249      ( 22 Nov 2022 07:45 )             50.2     11-22    138  |  104  |  40<H>  ----------------------------<  217<H>  4.6   |  25  |  1.96<H>    Ca    9.1      22 Nov 2022 07:45    TPro  7.1  /  Alb  3.2<L>  /  TBili  0.7  /  DBili  x   /  AST  19  /  ALT  31  /  AlkPhos  62  11-22    proBNP: Serum Pro-Brain Natriuretic Peptide: 1008 pg/mL (11-18 @ 13:45)

## 2023-05-18 PROBLEM — I25.10 ATHEROSCLEROTIC HEART DISEASE OF NATIVE CORONARY ARTERY WITHOUT ANGINA PECTORIS: Chronic | Status: ACTIVE | Noted: 2022-11-18

## 2023-06-12 ENCOUNTER — OUTPATIENT (OUTPATIENT)
Dept: OUTPATIENT SERVICES | Facility: HOSPITAL | Age: 88
LOS: 1 days | End: 2023-06-12
Payer: MEDICARE

## 2023-06-12 DIAGNOSIS — Z90.49 ACQUIRED ABSENCE OF OTHER SPECIFIED PARTS OF DIGESTIVE TRACT: Chronic | ICD-10-CM

## 2023-06-12 DIAGNOSIS — R06.02 SHORTNESS OF BREATH: ICD-10-CM

## 2023-06-12 DIAGNOSIS — Z90.5 ACQUIRED ABSENCE OF KIDNEY: Chronic | ICD-10-CM

## 2023-06-12 PROCEDURE — 78452 HT MUSCLE IMAGE SPECT MULT: CPT | Mod: MH

## 2023-06-12 PROCEDURE — A9502: CPT

## 2023-06-12 PROCEDURE — 93017 CV STRESS TEST TRACING ONLY: CPT

## 2023-06-16 ENCOUNTER — APPOINTMENT (OUTPATIENT)
Dept: CARE COORDINATION | Facility: HOME HEALTH | Age: 88
End: 2023-06-16

## 2023-06-16 ENCOUNTER — NON-APPOINTMENT (OUTPATIENT)
Age: 88
End: 2023-06-16

## 2023-08-31 ENCOUNTER — APPOINTMENT (OUTPATIENT)
Dept: CARE COORDINATION | Facility: HOME HEALTH | Age: 88
End: 2023-08-31
Payer: MEDICARE

## 2023-08-31 VITALS — HEIGHT: 69 IN | BODY MASS INDEX: 37.03 KG/M2 | WEIGHT: 250 LBS

## 2023-08-31 DIAGNOSIS — J44.9 CHRONIC OBSTRUCTIVE PULMONARY DISEASE, UNSPECIFIED: ICD-10-CM

## 2023-08-31 DIAGNOSIS — I15.2 TYPE 2 DIABETES MELLITUS WITH OTHER SPECIFIED COMPLICATION: ICD-10-CM

## 2023-08-31 DIAGNOSIS — I27.20 PULMONARY HYPERTENSION, UNSPECIFIED: ICD-10-CM

## 2023-08-31 DIAGNOSIS — E11.59 TYPE 2 DIABETES MELLITUS WITH OTHER SPECIFIED COMPLICATION: ICD-10-CM

## 2023-08-31 DIAGNOSIS — E11.69 TYPE 2 DIABETES MELLITUS WITH OTHER SPECIFIED COMPLICATION: ICD-10-CM

## 2023-08-31 DIAGNOSIS — I48.91 UNSPECIFIED ATRIAL FIBRILLATION: ICD-10-CM

## 2023-08-31 DIAGNOSIS — E66.9 TYPE 2 DIABETES MELLITUS WITH OTHER SPECIFIED COMPLICATION: ICD-10-CM

## 2023-08-31 PROCEDURE — G0447 BEHAVIOR COUNSEL OBESITY 15M: CPT | Mod: 26,95

## 2023-08-31 PROCEDURE — G0444 DEPRESSION SCREEN ANNUAL: CPT | Mod: 26,95

## 2023-08-31 PROCEDURE — 99344 HOME/RES VST NEW MOD MDM 60: CPT | Mod: 95,25

## 2023-08-31 RX ORDER — METOPROLOL TARTRATE 25 MG/1
25 TABLET, FILM COATED ORAL
Refills: 0 | Status: ACTIVE | COMMUNITY

## 2023-08-31 RX ORDER — SITAGLIPTIN 50 MG/1
50 TABLET, FILM COATED ORAL
Refills: 0 | Status: ACTIVE | COMMUNITY

## 2023-08-31 RX ORDER — DULAGLUTIDE 0.75 MG/.5ML
0.75 INJECTION, SOLUTION SUBCUTANEOUS
Refills: 0 | Status: ACTIVE | COMMUNITY

## 2023-08-31 RX ORDER — TIOTROPIUM BROMIDE 18 UG/1
18 CAPSULE ORAL; RESPIRATORY (INHALATION)
Refills: 0 | Status: ACTIVE | COMMUNITY

## 2023-08-31 RX ORDER — MONTELUKAST SODIUM 10 MG/1
10 TABLET, FILM COATED ORAL
Refills: 0 | Status: ACTIVE | COMMUNITY

## 2023-08-31 RX ORDER — ISOSORBIDE MONONITRATE 30 MG
30 TABLET, EXTENDED RELEASE 24 HR ORAL
Refills: 0 | Status: ACTIVE | COMMUNITY

## 2023-08-31 RX ORDER — AMIODARONE HYDROCHLORIDE 200 MG/1
200 TABLET ORAL
Refills: 0 | Status: ACTIVE | COMMUNITY

## 2023-08-31 RX ORDER — DAPAGLIFLOZIN 10 MG/1
10 TABLET, FILM COATED ORAL
Refills: 0 | Status: ACTIVE | COMMUNITY

## 2023-08-31 RX ORDER — ICOSAPENT ETHYL 1000 MG/1
1 CAPSULE ORAL
Refills: 0 | Status: ACTIVE | COMMUNITY

## 2023-08-31 RX ORDER — AMLODIPINE BESYLATE 5 MG/1
5 TABLET ORAL
Refills: 0 | Status: ACTIVE | COMMUNITY

## 2023-08-31 RX ORDER — CLOPIDOGREL 75 MG/1
75 TABLET, FILM COATED ORAL
Refills: 0 | Status: ACTIVE | COMMUNITY

## 2023-08-31 RX ORDER — FLUTICASONE FUROATE AND VILANTEROL TRIFENATATE 100; 25 UG/1; UG/1
100-25 POWDER RESPIRATORY (INHALATION)
Refills: 0 | Status: ACTIVE | COMMUNITY

## 2023-08-31 RX ORDER — ATORVASTATIN CALCIUM 10 MG/1
10 TABLET, FILM COATED ORAL
Refills: 0 | Status: ACTIVE | COMMUNITY

## 2023-08-31 RX ORDER — GUAIFENESIN 1200 MG
1200 TABLET, EXTENDED RELEASE 12 HR ORAL
Refills: 0 | Status: ACTIVE | COMMUNITY

## 2023-08-31 RX ORDER — TAMSULOSIN HYDROCHLORIDE 0.4 MG/1
0.4 CAPSULE ORAL
Refills: 0 | Status: ACTIVE | COMMUNITY

## 2023-08-31 RX ORDER — HYDRALAZINE HYDROCHLORIDE 50 MG/1
50 TABLET ORAL
Refills: 0 | Status: ACTIVE | COMMUNITY

## 2023-08-31 RX ORDER — CEFUROXIME AXETIL 250 MG/1
250 TABLET ORAL
Refills: 0 | Status: ACTIVE | COMMUNITY

## 2023-08-31 RX ORDER — RIVAROXABAN 15 MG/1
15 TABLET, FILM COATED ORAL
Refills: 0 | Status: ACTIVE | COMMUNITY

## 2023-08-31 RX ORDER — BUDESONIDE AND FORMOTEROL FUMARATE DIHYDRATE 80; 4.5 UG/1; UG/1
80-4.5 AEROSOL RESPIRATORY (INHALATION)
Refills: 0 | Status: ACTIVE | COMMUNITY

## 2023-08-31 NOTE — COUNSELING
[Fall prevention counseling provided] : Fall prevention counseling provided [Adequate lighting] : Adequate lighting [No throw rugs] : No throw rugs [Use proper foot wear] : Use proper foot wear [Use recommended devices] : Use recommended devices [Behavioral health counseling provided] : Behavioral health counseling provided [Sleep ___ hours/day] : Sleep [unfilled] hours/day [Engage in a relaxing activity] : Engage in a relaxing activity [Plan in advance] : Plan in advance [No] : Risk of tobacco use and health benefits of smoking cessation discussed: No [Potential consequences of obesity discussed] : Potential consequences of obesity discussed [Benefits of weight loss discussed] : Benefits of weight loss discussed [Encouraged to maintain food diary] : Encouraged to maintain food diary [Encouraged to increase physical activity] : Encouraged to increase physical activity [Encouraged to use exercise tracking device] : Encouraged to use exercise tracking device [Weigh Self Weekly] : weigh self weekly [Decrease Portions] : decrease portions [Keep Food Diary] : keep food diary [Needs reinforcement, provided] : Patient needs reinforcement on understanding of disease, goals and obesity follow-up plan; reinforcement was provided [FreeTextEntry1] : assistive device use of cane [FreeTextEntry4] : 15

## 2023-08-31 NOTE — PLAN
[FreeTextEntry1] : Patient seen in home, enforced w/ pt the need for daily weight/bp monitoring/blood glucose monitoring, low salt diet and educated pt to avoid processed/canned food and limit take out, increase vegetable/fiber and fruit intake (portion control).  Daily exercise w/ easy to reach realistic goals.  Continue w/ current regimen and medication as ordered.  Adhere to follow up w/ pcp/endo/opth/nephro/dpm.  Pt advised to call with any questions/concerns.

## 2023-08-31 NOTE — HEALTH RISK ASSESSMENT
[Fair] : ~his/her~ current health as fair  [Good] : ~his/her~  mood as  good [No] : No [Any fall with injury in past year] : Patient reported fall with injury in the past year [Assistive Device] : Patient uses an assistive device [1] : 2) Feeling down, depressed, or hopeless for several days (1) [PHQ-2 Positive] : PHQ-2 Positive [Several Days (1)] : 2.) Feeling down, depressed or hopeless? Several days [Not at All (0)] : 9.) Thoughts that you would be off dead or of hurting yourself in some way? Not at all [PHQ-9 Negative - No further assessment needed] : PHQ-9 Negative - No further assessment needed [I have developed a follow-up plan documented below in the note.] : I have developed a follow-up plan documented below in the note. [With Significant Other] : lives with significant other [Retired] : retired [] :  [Feels Safe at Home] : Feels safe at home [Reports changes in vision] : Reports changes in vision [Reports normal functional visual acuity (ie: able to read med bottle)] : Reports normal functional visual acuity [Reports changes in dental health] : Reports changes in dental health [Smoke Detector] : smoke detector [Carbon Monoxide Detector] : carbon monoxide detector [With Patient/Caregiver] : , with patient/caregiver [Designated Healthcare Proxy] : Designated healthcare proxy [Name: ___] : Health Care Proxy's Name: [unfilled]  [Relationship: ___] : Relationship: [unfilled] [I will adhere to the patient's wishes.] : I will adhere to the patient's wishes. [Time Spent: ___ minutes] : Time Spent: [unfilled] minutes [Former] : Former [> 15 Years] : > 15 Years [Audit-CScore] : 0 [de-identified] : cane [[id number]] : 2 [[id number]] : 2 [Change in mental status noted] : No change in mental status noted [Language] : denies difficulty with language [Behavior] : denies difficulty with behavior [Learning/Retaining New Information] : denies difficulty learning/retaining new information [Handling Complex Tasks] : denies difficulty handling complex tasks [Reasoning] : denies difficulty with reasoning [Spatial Ability and Orientation] : denies difficulty with spatial ability and orientation [Reports changes in hearing] : Reports no changes in hearing [de-identified] : w/ assist from spouse [de-identified] : w/ assist from spouse [de-identified] : blurred vision bilaterally [AdvancecareDate] : 08/23 [FreeTextEntry4] : HCP completed, content not disclosed

## 2023-08-31 NOTE — HISTORY OF PRESENT ILLNESS
[Spouse] : spouse [FreeTextEntry1] : This visit was provided via telehealth using real-time 2-way audio visual technology. The patient, VIRGEN AZEVEDO was located at [facility], [address] [place] [address] , at the time of the visit.  The provider, Raphael SIEGEL, was located at [facility] at the time of the visit.  The patient, VIRGEN AZEVEDO and provider participated in the telehealth encounter.  Verbal consent for telehealth services was given at time prior to the start of visit.  [de-identified] : HFI. This is VIRGEN AZEVEDO 88 year English (assisted by [employer]  # [id number]) M, presented for virtual visit as stated above.  Recently reported vitals in flow sheet. Medication reconciliation performed.  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  Spouse is present.  Patient denies any feeling of depression, and HI/SI. Patient denies any f/c, sob, cp, dizziness, lightheadedness, abnormal bruising/bleeding, n/v/d, or abdominal pain.

## 2023-08-31 NOTE — PHYSICAL EXAM
[No Acute Distress] : no acute distress [Well Nourished] : well nourished [Well Developed] : well developed [Well-Appearing] : well-appearing [Normal Sclera/Conjunctiva] : normal sclera/conjunctiva [Supple] : supple [No Respiratory Distress] : no respiratory distress  [No Accessory Muscle Use] : no accessory muscle use [Soft] : abdomen soft [Non Tender] : non-tender [Non-distended] : non-distended [No Masses] : no abdominal mass palpated [Speech Grossly Normal] : speech grossly normal [Memory Grossly Normal] : memory grossly normal [Alert and Oriented x3] : oriented to person, place, and time [Normal Mood] : the mood was normal [Normal] : affect was normal and insight and judgment were intact [de-identified] : lbp [de-identified] : bilateral knee pain

## 2023-08-31 NOTE — INTERPRETER SERVICES
[[employer] ] : provided by [employer]   [Time Spent: ____ minutes] : Total time spent using  services: [unfilled] minutes. The patient's primary language is not English thus required  services. [Interpreters_IDNumber] : [id number] [TWNoteComboBox1] : Algerian

## 2023-08-31 NOTE — REVIEW OF SYSTEMS
[Vision Problems] : vision problems [Dyspnea on Exertion] : dyspnea on exertion [Hesitancy] : hesitancy [Frequency] : frequency [Joint Pain] : joint pain [Back Pain] : back pain [Unsteady Walk] : ataxia [Negative] : Psychiatric [Chest Pain] : no chest pain [Shortness Of Breath] : no shortness of breath [Wheezing] : no wheezing [Cough] : no cough [Abdominal Pain] : no abdominal pain [Vomiting] : no vomiting [FreeTextEntry3] : blurred bilaterally  [FreeTextEntry9] : bilateral knees [de-identified] : w/ use of cane

## 2024-04-01 ENCOUNTER — EMERGENCY (EMERGENCY)
Facility: HOSPITAL | Age: 89
LOS: 1 days | End: 2024-04-01
Attending: STUDENT IN AN ORGANIZED HEALTH CARE EDUCATION/TRAINING PROGRAM
Payer: MEDICARE

## 2024-04-01 VITALS — TEMPERATURE: 99 F

## 2024-04-01 DIAGNOSIS — Z90.5 ACQUIRED ABSENCE OF KIDNEY: Chronic | ICD-10-CM

## 2024-04-01 DIAGNOSIS — Z90.49 ACQUIRED ABSENCE OF OTHER SPECIFIED PARTS OF DIGESTIVE TRACT: Chronic | ICD-10-CM

## 2024-04-01 LAB
ALBUMIN SERPL ELPH-MCNC: 2.6 G/DL — LOW (ref 3.5–5)
ALP SERPL-CCNC: 67 U/L — SIGNIFICANT CHANGE UP (ref 40–120)
ALT FLD-CCNC: 157 U/L DA — HIGH (ref 10–60)
ANION GAP SERPL CALC-SCNC: 13 MMOL/L — SIGNIFICANT CHANGE UP (ref 5–17)
APTT BLD: 47.4 SEC — HIGH (ref 24.5–35.6)
AST SERPL-CCNC: 159 U/L — HIGH (ref 10–40)
BASOPHILS # BLD AUTO: 0 K/UL — SIGNIFICANT CHANGE UP (ref 0–0.2)
BASOPHILS NFR BLD AUTO: 0 % — SIGNIFICANT CHANGE UP (ref 0–2)
BILIRUB SERPL-MCNC: 0.4 MG/DL — SIGNIFICANT CHANGE UP (ref 0.2–1.2)
BUN SERPL-MCNC: 27 MG/DL — HIGH (ref 7–18)
BURR CELLS BLD QL SMEAR: SLIGHT — SIGNIFICANT CHANGE UP
CALCIUM SERPL-MCNC: 12.1 MG/DL — HIGH (ref 8.4–10.5)
CHLORIDE SERPL-SCNC: 103 MMOL/L — SIGNIFICANT CHANGE UP (ref 96–108)
CO2 SERPL-SCNC: 25 MMOL/L — SIGNIFICANT CHANGE UP (ref 22–31)
CREAT SERPL-MCNC: 2.32 MG/DL — HIGH (ref 0.5–1.3)
EGFR: 27 ML/MIN/1.73M2 — LOW
ELLIPTOCYTES BLD QL SMEAR: SLIGHT — SIGNIFICANT CHANGE UP
EOSINOPHIL # BLD AUTO: 0 K/UL — SIGNIFICANT CHANGE UP (ref 0–0.5)
EOSINOPHIL NFR BLD AUTO: 0 % — SIGNIFICANT CHANGE UP (ref 0–6)
GLUCOSE SERPL-MCNC: 260 MG/DL — HIGH (ref 70–99)
HCT VFR BLD CALC: 42.8 % — SIGNIFICANT CHANGE UP (ref 39–50)
HGB BLD-MCNC: 13.5 G/DL — SIGNIFICANT CHANGE UP (ref 13–17)
INR BLD: 1.4 RATIO — HIGH (ref 0.85–1.18)
LACTATE SERPL-SCNC: 9.8 MMOL/L — CRITICAL HIGH (ref 0.7–2)
LIDOCAIN IGE QN: 49 U/L — SIGNIFICANT CHANGE UP (ref 13–75)
LYMPHOCYTES # BLD AUTO: 4.45 K/UL — HIGH (ref 1–3.3)
LYMPHOCYTES # BLD AUTO: 40 % — SIGNIFICANT CHANGE UP (ref 13–44)
MACROCYTES BLD QL: SLIGHT — SIGNIFICANT CHANGE UP
MAGNESIUM SERPL-MCNC: 2.7 MG/DL — HIGH (ref 1.6–2.6)
MANUAL SMEAR VERIFICATION: SIGNIFICANT CHANGE UP
MCHC RBC-ENTMCNC: 31.5 GM/DL — LOW (ref 32–36)
MCHC RBC-ENTMCNC: 33.1 PG — SIGNIFICANT CHANGE UP (ref 27–34)
MCV RBC AUTO: 104.9 FL — HIGH (ref 80–100)
METAMYELOCYTES # FLD: 3 % — HIGH (ref 0–0)
MONOCYTES # BLD AUTO: 0.78 K/UL — SIGNIFICANT CHANGE UP (ref 0–0.9)
MONOCYTES NFR BLD AUTO: 7 % — SIGNIFICANT CHANGE UP (ref 2–14)
MYELOCYTES NFR BLD: 1 % — HIGH (ref 0–0)
NEUTROPHILS # BLD AUTO: 5.23 K/UL — SIGNIFICANT CHANGE UP (ref 1.8–7.4)
NEUTROPHILS NFR BLD AUTO: 45 % — SIGNIFICANT CHANGE UP (ref 43–77)
NEUTS BAND # BLD: 2 % — SIGNIFICANT CHANGE UP (ref 0–8)
NRBC # BLD: 0 /100 WBCS — SIGNIFICANT CHANGE UP (ref 0–0)
NT-PROBNP SERPL-SCNC: 1820 PG/ML — HIGH (ref 0–450)
PHOSPHATE SERPL-MCNC: 9 MG/DL — HIGH (ref 2.5–4.5)
PLAT MORPH BLD: NORMAL — SIGNIFICANT CHANGE UP
PLATELET # BLD AUTO: 153 K/UL — SIGNIFICANT CHANGE UP (ref 150–400)
POIKILOCYTOSIS BLD QL AUTO: SLIGHT — SIGNIFICANT CHANGE UP
POTASSIUM SERPL-MCNC: 3.4 MMOL/L — LOW (ref 3.5–5.3)
POTASSIUM SERPL-SCNC: 3.4 MMOL/L — LOW (ref 3.5–5.3)
PROT SERPL-MCNC: 5.8 G/DL — LOW (ref 6–8.3)
PROTHROM AB SERPL-ACNC: 15.8 SEC — HIGH (ref 9.5–13)
RBC # BLD: 4.08 M/UL — LOW (ref 4.2–5.8)
RBC # FLD: 13.3 % — SIGNIFICANT CHANGE UP (ref 10.3–14.5)
RBC BLD AUTO: ABNORMAL
SODIUM SERPL-SCNC: 141 MMOL/L — SIGNIFICANT CHANGE UP (ref 135–145)
TROPONIN I, HIGH SENSITIVITY RESULT: 55.3 NG/L — SIGNIFICANT CHANGE UP
VARIANT LYMPHS # BLD: 2 % — SIGNIFICANT CHANGE UP (ref 0–6)
WBC # BLD: 11.12 K/UL — HIGH (ref 3.8–10.5)
WBC # FLD AUTO: 11.12 K/UL — HIGH (ref 3.8–10.5)

## 2024-04-01 PROCEDURE — 83690 ASSAY OF LIPASE: CPT

## 2024-04-01 PROCEDURE — 31500 INSERT EMERGENCY AIRWAY: CPT

## 2024-04-01 PROCEDURE — 99285 EMERGENCY DEPT VISIT HI MDM: CPT | Mod: 25

## 2024-04-01 PROCEDURE — 84484 ASSAY OF TROPONIN QUANT: CPT

## 2024-04-01 PROCEDURE — 85730 THROMBOPLASTIN TIME PARTIAL: CPT

## 2024-04-01 PROCEDURE — 84100 ASSAY OF PHOSPHORUS: CPT

## 2024-04-01 PROCEDURE — 87077 CULTURE AEROBIC IDENTIFY: CPT

## 2024-04-01 PROCEDURE — 36415 COLL VENOUS BLD VENIPUNCTURE: CPT

## 2024-04-01 PROCEDURE — 87150 DNA/RNA AMPLIFIED PROBE: CPT

## 2024-04-01 PROCEDURE — 80053 COMPREHEN METABOLIC PANEL: CPT

## 2024-04-01 PROCEDURE — 85610 PROTHROMBIN TIME: CPT

## 2024-04-01 PROCEDURE — 99285 EMERGENCY DEPT VISIT HI MDM: CPT

## 2024-04-01 PROCEDURE — 83735 ASSAY OF MAGNESIUM: CPT

## 2024-04-01 PROCEDURE — 85025 COMPLETE CBC W/AUTO DIFF WBC: CPT

## 2024-04-01 PROCEDURE — 83880 ASSAY OF NATRIURETIC PEPTIDE: CPT

## 2024-04-01 PROCEDURE — 87040 BLOOD CULTURE FOR BACTERIA: CPT

## 2024-04-01 PROCEDURE — 83605 ASSAY OF LACTIC ACID: CPT

## 2024-04-01 RX ORDER — NOREPINEPHRINE BITARTRATE/D5W 8 MG/250ML
0.05 PLASTIC BAG, INJECTION (ML) INTRAVENOUS
Qty: 16 | Refills: 0 | Status: DISCONTINUED | OUTPATIENT
Start: 2024-04-01 | End: 2024-04-05

## 2024-04-01 NOTE — ED ADULT NURSE NOTE - SUICIDE SCREENING QUESTION 3
-A1c 7.6, not insulin dependent  - MDSSI  -Currently on Insulin gtt   - Endocrine following, appreciate assistance with blood glucose management   Patient unable to complete

## 2024-04-01 NOTE — ED PROVIDER NOTE - CLINICAL SUMMARY MEDICAL DECISION MAKING FREE TEXT BOX
88 ~ y/o  BIBEMS after being found unresponsive in cardiac and respiratory arrest.  . Pt arrived with CPR in progress. ETT placed and confirmed in the ED by myself and a central line was placed.  Pt had acls protocol as documented on the code sheet.  Please see the code sheet for further details.    TOD 2207. 88 ~ y/o  BIBEMS after being found unresponsive in cardiac and respiratory arrest.  . Pt arrived with CPR in progress. ETT placed and confirmed in the ED by myself and a central line was placed to facilitate resuscitation with multiple medications.  Pt had acls protocol as documented on the code sheet.  Please see the code sheet for further details.    TOD 2206.    Initial Rhythm: PEA  Cardiac compression was performed by staff in order to sustain blood flow. The patient was ventilated and oxygenated. The patient received appropriate ACLS measures and these were repeated as necessary throughout the resuscitation.  CPR was performed under my direct supervision and guidance.    See nursing note for medications and times given.    Critical care time spent =60 minutes in coordination of efforts between myself as well as the rest of the ED staff.

## 2024-04-01 NOTE — ED ADULT NURSE NOTE - NSFALLHARMRISKINTERV_ED_ALL_ED
Assistance OOB with selected safe patient handling equipment if applicable/Communicate risk of Fall with Harm to all staff, patient, and family/Monitor for mental status changes and reorient to person, place, and time, as needed/Move patient closer to nursing station/within visual sight of ED staff/Provide visual cue: red socks, yellow wristband, yellow gown, etc/Reinforce activity limits and safety measures with patient and family/Toileting schedule using arm’s reach rule for commode and bathroom/Use of alarms - bed, stretcher, chair and/or video monitoring/Bed in lowest position, wheels locked, appropriate side rails in place/Call bell, personal items and telephone in reach/Instruct patient to call for assistance before getting out of bed/chair/stretcher/Non-slip footwear applied when patient is off stretcher/[place] to call system/Physically safe environment - no spills, clutter or unnecessary equipment/Purposeful Proactive Rounding/Room/bathroom lighting operational, light cord in reach

## 2024-04-01 NOTE — ED PROVIDER NOTE - OBJECTIVE STATEMENT
88-year-old male brought in by EMS for cardiac arrest.  Patient had witnessed arrest in front of family.  Now was called the patient was found to be unresponsive in cardiac arrest multiple rounds of epinephrine given prior to arrival.  Patient in PEA on arrival.  Patient intubated on arrival resuscitation continued.  Brief period of ROSC obtained however patient deteriorated again into PEA.  Total downtime exceeded 45 minutes and after second episode in the ED cardiac arrest no ROSC returned patient declared dead at 20 2:06 PM.  Family informed. 88-year-old male brought in by EMS for cardiac arrest.  Patient had witnessed arrest in front of family.  911 was called, EMS found the patient to be unresponsive in cardiac arrest multiple rounds of epinephrine given prior to arrival.  Patient in PEA on arrival.  Patient intubated on arrival and resuscitation continued.  Brief period of ROSC obtained however patient deteriorated again into PEA.  Total downtime exceeded 45 minutes and after second episode in the ED of cardiac arrest no ROSC, patient declared dead at 10:06 PM.  Family informed of death.      GENERALIZED APPEARANCE:  Unresponsive.   SKIN:  Pale and cold.   HEENMT: Atraumatic. Pupils fixed and dilated.   RESPIRATORY:  Clear to auscultation B/L via ETT.  CARDIOVASCULAR:  [place].  GI:  Soft, non-tender, non-distended.    EXTREMITIES:  No deformity.  NEURO: AAOx0. Unresponsive to tactile or verbal stimuli.

## 2024-04-01 NOTE — ED ADULT TRIAGE NOTE - CHIEF COMPLAINT QUOTE
Pt medical notification s/p cardiac arrest at home, CPR in progress  upon arrival. As per EMS, last well known 8PM.

## 2024-04-01 NOTE — ED PROCEDURE NOTE - CPROC ED TRACHE INTUB DETAIL1
Difficult/crash intubation (see additional details section)./Patient connected to ventilator with settings as ordered./During intubation, applied gentle pressure to the cricoid cartilage.

## 2024-04-01 NOTE — ED PROVIDER NOTE - CRITICAL CARE ATTENDING CONTRIBUTION TO CARE
Upon my evaluation, this patient had a high probability of imminent or life-threatening deterioration due to sudden cardiac arrest which required my direct attention, intervention, and personal management.    I have personally provided 60 minutes of critical care time exclusive of time spent on separately billable procedures. Time includes review of laboratory data, radiology results, discussion with consultants, and monitoring for potential decompensation. Interventions were performed as documented above.

## 2024-04-03 LAB
-  BLOOD PCR PANEL: SIGNIFICANT CHANGE UP
GRAM STN FLD: ABNORMAL
METHOD TYPE: SIGNIFICANT CHANGE UP

## 2024-04-06 LAB — GRAM STN FLD: ABNORMAL

## 2024-04-07 LAB
CULTURE RESULTS: ABNORMAL
CULTURE RESULTS: ABNORMAL
ORGANISM # SPEC MICROSCOPIC CNT: ABNORMAL
ORGANISM # SPEC MICROSCOPIC CNT: ABNORMAL
SPECIMEN SOURCE: SIGNIFICANT CHANGE UP
SPECIMEN SOURCE: SIGNIFICANT CHANGE UP
